# Patient Record
Sex: FEMALE | Race: WHITE | Employment: OTHER | URBAN - METROPOLITAN AREA
[De-identification: names, ages, dates, MRNs, and addresses within clinical notes are randomized per-mention and may not be internally consistent; named-entity substitution may affect disease eponyms.]

---

## 2017-02-19 RX ORDER — LISINOPRIL 10 MG/1
TABLET ORAL
Qty: 30 TAB | Refills: 4 | Status: SHIPPED | OUTPATIENT
Start: 2017-02-19 | End: 2017-06-26 | Stop reason: SDUPTHER

## 2017-02-23 ENCOUNTER — OFFICE VISIT (OUTPATIENT)
Dept: INTERNAL MEDICINE CLINIC | Age: 67
End: 2017-02-23

## 2017-02-23 VITALS
SYSTOLIC BLOOD PRESSURE: 142 MMHG | TEMPERATURE: 99.3 F | HEART RATE: 78 BPM | BODY MASS INDEX: 24.81 KG/M2 | WEIGHT: 115 LBS | RESPIRATION RATE: 14 BRPM | HEIGHT: 57 IN | DIASTOLIC BLOOD PRESSURE: 80 MMHG

## 2017-02-23 DIAGNOSIS — G24.5 BENIGN ESSENTIAL BLEPHAROSPASM: Primary | ICD-10-CM

## 2017-02-23 DIAGNOSIS — N94.9 GYNECOLOGICAL DISORDER: ICD-10-CM

## 2017-02-23 DIAGNOSIS — K22.70 BARRETT'S ESOPHAGUS WITHOUT DYSPLASIA: ICD-10-CM

## 2017-02-23 DIAGNOSIS — M81.0 OSTEOPOROSIS: ICD-10-CM

## 2017-02-23 NOTE — PROGRESS NOTES
SUBJECTIVE: Terell Johnson is a 74 y.o. female seen for a follow up visit; she has hypertension and hyperlipidemia. Current Outpatient Prescriptions   Medication Sig Dispense Refill    lisinopril (PRINIVIL, ZESTRIL) 10 mg tablet TAKE ONE TABLET BY MOUTH DAILY 30 Tab 4    RESTASIS 0.05 % ophthalmic emulsion   6    simvastatin (ZOCOR) 10 mg tablet Take 1 Tab by mouth nightly. 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 New concerns: Patient  also  is here for evaluation of osteoporosis. She has not had history of fracture . Bone density is up to date and showed   DEXA Results (most recent):    Results from Hospital Encounter encounter on 09/21/16   DEXA BONE DENSITY STUDY AXIAL   Narrative Bone Mineral Density    Indication:  screening  Age: 74  Sex: Female. Menopause status: postmenopausal.  Hormone replacement therapy: No     Number of falls in the past year:   1   Risk factors for osteoporosis:  None. Current medication for osteoporosis: None. Comparison: None. Technique: Imaging was performed on the Concept3D      Excluded sites: None     Findings:      Femoral Neck:  Left  Bone mineral density (gm/cm2):?  0.673  % of peak bone mass: 65  % for age matched controls:? 80  T-score: -2.6  Z-score: -0.7    Total Hip: Left  Bone mineral density (gm/cm2):  0.870  % of peak bone mass:   86  % for age matched controls:  110  T-score:   -1.1  Z-score:  0.6    Lumbar Spine:  L1-L4  Bone mineral density (gm/cm2):  0.863  % of peak bone mass:  73   % for age matched controls:  80  T-score:  -2.7  Z-score:  -0.5    The T score for the left ultra distal radius is -2.5. The T score for the left  distal third radius is -1.8. Impression Impression: This patient is osteoporotic using the World Health Organization criteria      Recommendations:  Therapy recommendations need to be tailored to each individual patient. Please reconsider testing based on risk factors. Currently, Medicare will only  reimburse for a central DXA examination every two years, unless the patient is  on chronic glucocorticoid therapy. Note: Please note that reliable, valid comparisons cannot be made between  studies which have been performed on machines from different manufacturers. If  clinically warranted, a follow up study performed at this site, on the same  unit, would allow the most sensitive assessment of change in bone mineral  density. . Patient does exercise daily. Family history is positive for osteoporosis. Age at menopause 46. Patient does not smoke. Previous treatment calcium with vitamin D 1200mg daily and was on evista for a short while. .   Off gerd meds no symptoms  Needs egd to confirm  OBJECTIVE:  Visit Vitals    /80    Pulse 78    Temp 99.3 °F (37.4 °C) (Oral)    Resp 14    Ht 4' 9\" (1.448 m)    Wt 115 lb (52.2 kg)    BMI 24.89 kg/m2      Appearance: alert, well appearing, and in no distress, oriented to person, place, and time and normal appearing weight. General exam: CVS exam BP noted to be borderline elevated today in office, S1, S2 normal, no gallop, no murmur, chest clear, no JVD, no HSM, no edema, peripheral vascular exam both carotids normal upstroke without bruits, neurological exam alert, oriented, normal speech, no focal findings or movement disorder noted, normal muscle tone, no tremors, strength 5/5.   Lab review: most recent lipid panel reviewed, showing LDL result meets goal. ASSESSMENT:  hypertension stable, reasonably well controlled, needs further observation. PLAN:  current treatment plan is effective, no change in therapy  lab results and schedule of future lab studies reviewed with patient  repeat labs ordered prior to next appointment. Reviewed   Data suggest biphosphonate  She is resistant  Will   Repeat BMD 2018  Weight bearing  Calcium supplement 500 mg daily with Vit D  600 to 1000 units daily advised for bone protection  Gabriela Stratton was seen today for hypertension.     Diagnoses and all orders for this visit:    Benign essential blepharospasm    Gama's esophagus without dysplasia  -     REFERRAL TO GASTROENTEROLOGY    Gynecological disorder  -     REFERRAL TO OBSTETRICS AND GYNECOLOGY    Osteoporosis

## 2017-02-23 NOTE — MR AVS SNAPSHOT
Visit Information Date & Time Provider Department Dept. Phone Encounter #  
 2/23/2017  1:45 PM Mirian Diaz, [facility] [facility] [phone] [id number] Upcoming Health Maintenance Date Due DTaP/Tdap/Td series (1 - Tdap) 6/4/1969 ZOSTER VACCINE AGE 60> 6/4/2008 Pneumococcal 65+ Low/Medium Risk (1 of 2 - PCV13) 6/4/2013 INFLUENZA AGE 9 TO ADULT 8/1/2016 MEDICARE YEARLY EXAM 8/17/2017 GLAUCOMA SCREENING Q2Y 12/11/2017 BREAST CANCER SCRN MAMMOGRAM 9/21/2018 COLONOSCOPY 5/30/2020 Allergies as of 2/23/2017  Review Complete On: 2/23/2017 By: Damaris Mims LPN Severity Noted Reaction Type Reactions Codeine  10/14/2015    Other (comments) Keflex [Cephalexin]  10/14/2015    Other (comments) Current Immunizations  Reviewed on 2/23/2017 Name Date Influenza Vaccine 10/1/2016 Influenza Vaccine (Quad) PF 10/14/2015 Reviewed by Damaris Mims LPN on 9/69/4701 at  1:51 PM  
You Were Diagnosed With   
  
 Codes Comments Gama's esophagus without dysplasia    -  Primary ICD-10-CM: K22.70 ICD-9-CM: 530.85 Gynecological disorder     ICD-10-CM: N94.9 ICD-9-CM: 629.9 Vitals BP  
  
  
  
  
  
 142/80 Vitals History BMI and BSA Data Body Mass Index Body Surface Area  
 24.89 kg/m 2 1.45 m 2 Preferred Pharmacy Pharmacy Name Phone Rodriguez Pardo [address] [facility], [address] [place] [address] [phone] Your Updated Medication List  
  
   
This list is accurate as of: 2/23/17  2:20 PM.  Always use your most recent med list.  
  
  
  
  
 lisinopril 10 mg tablet Commonly known as:  PRINIVIL, ZESTRIL  
TAKE ONE TABLET BY MOUTH DAILY RESTASIS 0.05 % ophthalmic emulsion Generic drug:  cycloSPORINE  
  
 simvastatin 10 mg tablet Commonly known as:  ZOCOR Take 1 Tab by mouth nightly. We Performed the Following REFERRAL TO GASTROENTEROLOGY [UWN82 Custom] Comments:  
 Please evaluate patient for poss gerd Miryam Ku REFERRAL TO OBSTETRICS AND GYNECOLOGY [REF51 Custom] Referral Information Referral ID Referred By Referred To  
  
 [id number] SHADY, [address] [facility]   
   [address]  [place], [address] Visits Status Start Date End Date 1 New Request 2/23/17 2/23/18 If your referral has a status of pending review or denied, additional information will be sent to support the outcome of this decision. Referral ID Referred By Referred To  
 [id number] Kathie Camara MD  
   [address] [facility], [address] Phone: [phone] Fax: [phone] Visits Status Start Date End Date 1 New Request 2/23/17 2/23/18 If your referral has a status of pending review or denied, additional information will be sent to support the outcome of this decision. Introducing [facility] & HEALTH SERVICES! Do Purvis introduces DINKlife patient portal. Now you can access parts of your medical record, email your doctor's office, and request medication refills online. 1. In your internet browser, go to [email or web address] 2. Click on the First Time User? Click Here link in the Sign In box. You will see the New Member Sign Up page. 3. Enter your DINKlife Access Code exactly as it appears below. You will not need to use this code after youve completed the sign-up process. If you do not sign up before the expiration date, you must request a new code. · DINKlife Access Code: [email or web address] Expires: 5/24/2017  2:19 PM 
 
4. Enter the last four digits of your Social Security Number (xxxx) and Date of Birth (mm/dd/yyyy) as indicated and click Submit. You will be taken to the next sign-up page. 5. Create a DINKlife ID.  This will be your DINKlife login ID and cannot be changed, so think of one that is secure and easy to remember. 6. Create a BuildingOps password. You can change your password at any time. 7. Enter your Password Reset Question and Answer. This can be used at a later time if you forget your password. 8. Enter your e-mail address. You will receive e-mail notification when new information is available in [address] 9. Click Sign Up. You can now view and download portions of your medical record. 10. Click the Download Summary menu link to download a portable copy of your medical information. If you have questions, please visit the Frequently Asked Questions section of the BuildingOps website. Remember, BuildingOps is NOT to be used for urgent needs. For medical emergencies, dial 911. Now available from your iPhone and Android! Please provide this summary of care documentation to your next provider. Your primary care clinician is listed as Valerie Saldana. If you have any questions after today's visit, please call [phone].

## 2017-03-13 DIAGNOSIS — E78.5 INCREASED SERUM LIPIDS: ICD-10-CM

## 2017-03-13 RX ORDER — SIMVASTATIN 10 MG/1
10 TABLET, FILM COATED ORAL
Qty: 90 TAB | Refills: 2 | Status: SHIPPED | OUTPATIENT
Start: 2017-03-13 | End: 2017-08-22 | Stop reason: SDUPTHER

## 2017-03-21 ENCOUNTER — OFFICE VISIT (OUTPATIENT)
Dept: OBGYN CLINIC | Age: 67
End: 2017-03-21

## 2017-03-21 VITALS
HEIGHT: 57 IN | HEART RATE: 67 BPM | SYSTOLIC BLOOD PRESSURE: 172 MMHG | WEIGHT: 117 LBS | BODY MASS INDEX: 25.24 KG/M2 | DIASTOLIC BLOOD PRESSURE: 77 MMHG

## 2017-03-21 DIAGNOSIS — Z01.419 ENCOUNTER FOR WELL WOMAN EXAM WITH ROUTINE GYNECOLOGICAL EXAM: Primary | ICD-10-CM

## 2017-03-21 NOTE — PATIENT INSTRUCTIONS

## 2017-03-21 NOTE — MR AVS SNAPSHOT
Visit Information Date & Time Provider Department Dept. Phone Encounter #  
 3/21/2017  9:00 AM Janeen Arguello, [address] [phone] [id number] Your Appointments 8/22/2017 11:00 AM  
ROUTINE CARE with Ronaldo Sanford MD  
[facility]) Appt Note: 6 month f/u  
 [place] [address] [facility] [address]  
[facility] [address] [facility] [address] [facility] [address] Upcoming Health Maintenance Date Due ZOSTER VACCINE AGE 60> 6/4/2008 BREAST CANCER SCRN MAMMOGRAM 9/21/2018 COLONOSCOPY 5/30/2020 Allergies as of 3/21/2017  Review Complete On: 3/21/2017 By: Matias Eldridge Severity Noted Reaction Type Reactions Codeine  10/14/2015    Nausea and Vomiting Keflex [Cephalexin]  10/14/2015    Nausea and Vomiting Current Immunizations  Reviewed on 2/23/2017 Name Date Influenza Vaccine 10/1/2016 Influenza Vaccine (Quad) PF 10/14/2015 Not reviewed this visit You Were Diagnosed With   
  
 Codes Comments Encounter for well woman exam with routine gynecological exam    -  Primary ICD-10-CM: Y82.129 ICD-9-CM: V72.31 Screening for HPV (human papillomavirus)     ICD-10-CM: Z11.51 
ICD-9-CM: V73.81 Vitals Height(growth percentile) Weight(growth percentile) BMI OB Status Smoking Status 4' 9\" (1.448 m) 117 lb (53.1 kg) 25.32 kg/m2 Postmenopausal Never Smoker BMI and BSA Data Body Mass Index Body Surface Area  
 25.32 kg/m 2 1.46 m 2 Preferred Pharmacy Pharmacy Name Phone [facility] [address] [facility] [employer], [address] [place] [address], [address] [phone] Your Updated Medication List  
  
   
This list is accurate as of: 3/21/17  9:11 AM.  Always use your most recent med list.  
  
  
  
  
 lisinopril 10 mg tablet Commonly known as:  PRINIVIL, ZESTRIL  
TAKE ONE TABLET BY MOUTH DAILY RESTASIS 0.05 % ophthalmic emulsion Generic drug:  cycloSPORINE  
  
 simvastatin 10 mg tablet Commonly known as:  ZOCOR Take 1 Tab by mouth nightly. Patient Instructions Well Visit, Over 72: Care Instructions Your Care Instructions Physical exams can help you stay healthy. Your doctor has checked your overall health and may have suggested ways to take good care of yourself. He or she also may have recommended tests. At home, you can help prevent illness with healthy eating, regular exercise, and other steps. Follow-up care is a key part of your treatment and safety. Be sure to make and go to all appointments, and call your doctor if you are having problems. It's also a good idea to know your test results and keep a list of the medicines you take. How can you care for yourself at home? · Reach and stay at a healthy weight. This will lower your risk for many problems, such as obesity, diabetes, heart disease, and high blood pressure. · Get at least 30 minutes of exercise on most days of the week. Walking is a good choice. You also may want to do other activities, such as running, swimming, cycling, or playing tennis or team sports. · Do not smoke. Smoking can make health problems worse. If you need help quitting, talk to your doctor about stop-smoking programs and medicines. These can increase your chances of quitting for good. · Protect your skin from too much sun. When you're outdoors from 10 a.m. to 4 p.m., stay in the shade or cover up with clothing and a hat with a wide brim. Wear sunglasses that block UV rays. Even when it's cloudy, put broad-spectrum sunscreen (SPF 30 or higher) on any exposed skin. · See a dentist one or two times a year for checkups and to have your teeth cleaned. · Wear a seat belt in the car. · Limit alcohol to 2 drinks a day for men and 1 drink a day for women. Too much alcohol can cause health problems. Follow your doctor's advice about when to have certain tests. These tests can spot problems early. For men and women · Cholesterol. Your doctor will tell you how often to have this done based on your overall health and other things that can increase your risk for heart attack and stroke. · Blood pressure. Have your blood pressure checked during a routine doctor visit. Your doctor will tell you how often to check your blood pressure based on your age, your blood pressure results, and other factors. · Diabetes. Ask your doctor whether you should have tests for diabetes. · Vision. Experts recommend that you have yearly exams for glaucoma and other age-related eye problems. · Hearing. Tell your doctor if you notice any change in your hearing. You can have tests to find out how well you hear. · Colon cancer tests. Keep having colon cancer tests as your doctor recommends. You can have one of several types of tests. · Heart attack and stroke risk. At least every 4 to 6 years, you should have your risk for heart attack and stroke assessed. Your doctor uses factors such as your age, blood pressure, cholesterol, and whether you smoke or have diabetes to show what your risk for a heart attack or stroke is over the next 10 years. · Osteoporosis. Talk to your doctor about whether you should have a bone density test to find out whether you have thinning bones. Also ask your doctor about whether you should take calcium and vitamin D supplements. For women · Pap test and pelvic exam. You may no longer need a Pap test. Talk with your doctor about whether to stop or continue to have Pap tests. · Breast exam and mammogram. Ask how often you should have a mammogram, which is an X-ray of your breasts. A mammogram can spot breast cancer before it can be felt and when it is easiest to treat. · Thyroid disease.  Talk to your doctor about whether to have your thyroid checked as part of a regular physical exam. Women have an increased chance of a thyroid problem. For men · Prostate exam. Talk to your doctor about whether you should have a blood test (called a PSA test) for prostate cancer. Experts disagree on whether men should have this test. Some experts recommend that you discuss the benefits and risks of the test with your doctor. · Abdominal aortic aneurysm. Ask your doctor whether you should have a test to check for an aneurysm. You may need a test if you ever smoked or if your parent, brother, sister, or child has had an aneurysm. When should you call for help? Watch closely for changes in your health, and be sure to contact your doctor if you have any problems or symptoms that concern you. Where can you learn more? Go to http://kristin-rajni.info/. Enter X399 in the search box to learn more about \"Well Visit, Over 65: Care Instructions. \" Current as of: July 19, 2016 Content Version: 11.1 © 0245-6115 Kakoona. Care instructions adapted under license by Awesomi (which disclaims liability or warranty for this information). If you have questions about a medical condition or this instruction, always ask your healthcare professional. [place] [address] any warranty or liability for your use of this information. Introducing [facility] & HEALTH SERVICES! Dear Chelly Casarez: Thank you for requesting a EvoApp account. Our records indicate that you already have an active EvoApp account. You can access your account anytime at [email or web address] Did you know that you can access your hospital and ER discharge instructions at any time in EvoApp? You can also review all of your test results from your hospital stay or ER visit. Additional Information If you have questions, please visit the Frequently Asked Questions section of the [email or web address] website at [email or web address] Remember, [email or web address] is NOT to be used for urgent needs. For medical emergencies, dial 911. Now available from your iPhone and Android! Please provide this summary of care documentation to your next provider. Your primary care clinician is listed as Carlos Orellana. If you have any questions after today's visit, please call [phone].

## 2017-03-21 NOTE — PROGRESS NOTES
Annual exam ages 69+  (New Patient)    Janes Plascencia is a ,  74 y.o. female [facility] No LMP recorded. Patient is postmenopausal.    She presents for her annual checkup. Moved here from [place] (). Sees Dr. Modesta Bland. She is having no significant problems. With regard to the Gardasil vaccine, she is older than the age for which it is FDA approved. Menstrual status:    Has gone through menopause. Around 46yo? No PMB. She denies dysmenorrhea. Hormonal status:  She reports no perimenstrual type symptoms. She is not having vasomotor symptoms. The patient is not using any ERT. Did take hormone therapy (Prempro?) for at least 3yrs. Sexual history:    She  reports that she does not currently engage in sexual activity but has had male partners.; She reports using the following method of birth control/protection: Surgical.    Medical conditions:    Since her last annual GYN exam about two years ago, she has not the following changes in her health history:  - dx'd with osteoporosis. Supposed to be taking Vit D, calcium. Has taken Evista in the past but had problems with leg cramps? Pap and Mammogram History:    Her most recent Pap smear was normal obtained 2 year(s) ago per pt. No h/o abnl paps. The patient had a recent mammogram 16 which was negative for malignancy. Breast Cancer History/Substance Abuse: negative    Osteoporosis History:    Family history does not include a first or second degree relative with osteopenia or osteoporosis. A bone density scan was obtained on 16 and revealed osteoporosis. She is not currently taking calcium and vit D. Past Medical History:   Diagnosis Date    Arthritis     Blepharospasm     Hx of mammogram 2016    Negative    Hyperlipidemia     Hypertension     Neuralgia     Osteoporosis 2016    DEXA    Routine Papanicolaou smear 2015    Negative per pt.      Sicca syndrome [facility])      Past Surgical History:   Procedure Laterality Date    HX TUBAL LIGATION      90s       Current Outpatient Prescriptions   Medication Sig Dispense Refill    simvastatin (ZOCOR) 10 mg tablet Take 1 Tab by mouth nightly. 90 Tab 2    lisinopril (PRINIVIL, ZESTRIL) 10 mg tablet TAKE ONE TABLET BY MOUTH DAILY 30 Tab 4    RESTASIS 0.05 % ophthalmic emulsion   6     Allergies: Codeine and Keflex [cephalexin]     Tobacco History:  reports that she has never smoked. She has never used smokeless tobacco.  Alcohol Abuse:  reports that she drinks about 3.0 oz of alcohol per week   Drug Abuse:  reports that she does not use illicit drugs.     Family Medical/Cancer History:   Family History   Problem Relation Age of Onset    Psychiatric Disorder Mother     Heart Disease Father     Diabetes Father     Alzheimer Father    John Trinidad Sister      Hodgkins Disease     Other Sister      Non-Hodkins Disease         Review of Systems - History obtained from the patient  Constitutional: negative for weight loss, fever, night sweats  HEENT: negative for hearing loss, earache, congestion, snoring, sorethroat  CV: negative for chest pain, palpitations, edema  Resp: negative for cough, shortness of breath, wheezing  GI: negative for change in bowel habits, abdominal pain, black or bloody stools  : negative for dysuria, hematuria, vaginal discharge; has always had some frequency  MSK: negative for back pain, joint pain, muscle pain  Breast: negative for breast lumps, nipple discharge, galactorrhea  Skin :negative for itching, rash, hives  Neuro: negative for dizziness, headache, confusion, weakness  Psych: negative for anxiety, depression, change in mood  Heme/lymph: negative for bleeding, bruising, pallor    Physical Exam    Visit Vitals    Ht 4' 9\" (1.448 m)    Wt 117 lb (53.1 kg)    BMI 25.32 kg/m2       Constitutional  · Appearance: well-nourished, well developed, alert, in no acute distress    HENT  · Head and Face: appears normal    Neck  · Inspection/Palpation: normal appearance, no masses or tenderness  · Lymph Nodes: no lymphadenopathy present  · Thyroid: gland size normal, nontender, no nodules or masses present on palpation    Chest  · Respiratory Effort: breathing unlabored  · Auscultation: normal breath sounds    Cardiovascular  · Heart:  · Auscultation: regular rate and rhythm without murmur    Breasts  · Inspection of Breasts: breasts symmetrical, no skin changes, no discharge present, nipple appearance normal, no skin retraction present  · Palpation of Breasts and Axillae: no masses present on palpation, no breast tenderness  · Axillary Lymph Nodes: no lymphadenopathy present    Gastrointestinal  · Abdominal Examination: abdomen non-tender to palpation, normal bowel sounds, no masses present  · Liver and spleen: no hepatomegaly present, spleen not palpable  · Hernias: no hernias identified    Genitourinary  · External Genitalia: normal appearance for age, no discharge present, no tenderness present, no inflammatory lesions present, no masses present, mild atrophy present  · Vagina: atrophic but otherwise normal vaginal vault without central or paravaginal defects, no discharge present, no inflammatory lesions present, no masses present  · Bladder: non-tender to palpation  · Urethra: appears normal  · Cervix: normal   · Uterus: normal size, shape and consistency  · Adnexa: no adnexal tenderness present, no adnexal masses present  · Perineum: perineum within normal limits, no evidence of trauma, no rashes or skin lesions present  · Anus: anus within normal limits, no hemorrhoids present  · Inguinal Lymph Nodes: no lymphadenopathy present  · RV:  No masses    Skin  · General Inspection: no rash, no lesions identified    Neurologic/Psychiatric  · Mental Status:  · Orientation: grossly oriented to person, place and time  · Mood and Affect: mood normal, affect appropriate    Assessment:  Routine gynecologic examination  Her current medical status is satisfactory with no evidence of significant gynecologic issues. Plan:  Counseled re: diet, exercise, healthy lifestyle  Return for yearly wellness visits  Rec annual mammogram  Pap done. Reviewed guidelines to exit routine screening. Will plan on pap q2yrs x3. If all negative, then can stop screening.     Orders Placed This Encounter    PAP, IG, RFX HPV ASCUS ([id number])

## 2017-03-24 LAB
CYTOLOGIST CVX/VAG CYTO: NORMAL
CYTOLOGY CVX/VAG DOC THIN PREP: NORMAL
DX ICD CODE: NORMAL
LABCORP, 190119: NORMAL
Lab: NORMAL
Lab: NORMAL
OTHER STN SPEC: NORMAL
PATH REPORT.FINAL DX SPEC: NORMAL
STAT OF ADQ CVX/VAG CYTO-IMP: NORMAL

## 2017-06-27 RX ORDER — LISINOPRIL 10 MG/1
TABLET ORAL
Qty: 30 TAB | Refills: 5 | Status: SHIPPED | OUTPATIENT
Start: 2017-06-27 | End: 2017-08-22 | Stop reason: SDUPTHER

## 2017-08-22 ENCOUNTER — OFFICE VISIT (OUTPATIENT)
Dept: INTERNAL MEDICINE CLINIC | Age: 67
End: 2017-08-22

## 2017-08-22 ENCOUNTER — HOSPITAL ENCOUNTER (OUTPATIENT)
Dept: LAB | Age: 67
Discharge: HOME | End: 2017-08-22
Payer: MEDICARE

## 2017-08-22 VITALS
BODY MASS INDEX: 25.03 KG/M2 | TEMPERATURE: 99 F | HEIGHT: 57 IN | SYSTOLIC BLOOD PRESSURE: 152 MMHG | OXYGEN SATURATION: 98 % | DIASTOLIC BLOOD PRESSURE: 70 MMHG | RESPIRATION RATE: 15 BRPM | HEART RATE: 64 BPM | WEIGHT: 116 LBS

## 2017-08-22 DIAGNOSIS — R51.9 TEMPORAL HEADACHE: ICD-10-CM

## 2017-08-22 DIAGNOSIS — M81.0 AGE-RELATED OSTEOPOROSIS WITHOUT CURRENT PATHOLOGICAL FRACTURE: ICD-10-CM

## 2017-08-22 DIAGNOSIS — E78.5 INCREASED SERUM LIPIDS: ICD-10-CM

## 2017-08-22 DIAGNOSIS — E78.5 DYSLIPIDEMIA: ICD-10-CM

## 2017-08-22 DIAGNOSIS — G24.5 BENIGN ESSENTIAL BLEPHAROSPASM: Primary | ICD-10-CM

## 2017-08-22 PROBLEM — H40.052 OCULAR HYPERTENSION OF LEFT EYE: Status: ACTIVE | Noted: 2017-08-22

## 2017-08-22 PROCEDURE — 83735 ASSAY OF MAGNESIUM: CPT

## 2017-08-22 PROCEDURE — 80053 COMPREHEN METABOLIC PANEL: CPT

## 2017-08-22 PROCEDURE — 84443 ASSAY THYROID STIM HORMONE: CPT

## 2017-08-22 PROCEDURE — 85025 COMPLETE CBC W/AUTO DIFF WBC: CPT

## 2017-08-22 PROCEDURE — 86140 C-REACTIVE PROTEIN: CPT

## 2017-08-22 PROCEDURE — 36415 COLL VENOUS BLD VENIPUNCTURE: CPT

## 2017-08-22 PROCEDURE — 80061 LIPID PANEL: CPT

## 2017-08-22 RX ORDER — LISINOPRIL 20 MG/1
TABLET ORAL
Qty: 90 TAB | Refills: 2 | Status: SHIPPED | OUTPATIENT
Start: 2017-08-22 | End: 2018-02-22 | Stop reason: SDUPTHER

## 2017-08-22 RX ORDER — LATANOPROST 50 UG/ML
1 SOLUTION/ DROPS OPHTHALMIC
COMMUNITY
Start: 2017-07-27 | End: 2019-11-25 | Stop reason: SDUPTHER

## 2017-08-22 RX ORDER — SIMVASTATIN 10 MG/1
10 TABLET, FILM COATED ORAL
Qty: 90 TAB | Refills: 2 | Status: SHIPPED | OUTPATIENT
Start: 2017-08-22 | End: 2018-02-22 | Stop reason: SDUPTHER

## 2017-08-22 NOTE — PROGRESS NOTES
Coordination of Care Questions    1. Have you been to the ER, urgent care clinic since your last visit? No       Hospitalized since your last visit? No    2. Have you seen or consulted any other health care providers outside of the [address] [place] [address] since your last visit? Include any pap smears or colon screening.  Yes eye doctor with Dr Boone Phillips and Dr Laura Oakley for [facility]

## 2017-08-22 NOTE — PROGRESS NOTES
This is a Subsequent Medicare Annual Wellness Exam (AWV) (Performed 12 months after IPPE or effective date of Medicare Part B enrollment, Once in a lifetime)    I have reviewed the patient's medical history in detail and updated the computerized patient record. History     Past Medical History:   Diagnosis Date    Arthritis     Blepharospasm     Hx of mammogram 2016    Negative    Hyperlipidemia     Hypertension     Neuralgia     Osteoporosis 2016    DEXA    Routine Papanicolaou smear 2017    Negative (no hpv)     Sicca syndrome (HCC)       Past Surgical History:   Procedure Laterality Date    HX  SECTION      x2    HX TUBAL LIGATION      90s; L/S     Current Outpatient Prescriptions   Medication Sig Dispense Refill    latanoprost (XALATAN) 0.005 % ophthalmic solution Administer 1 Drop to left eye nightly.  lisinopril (PRINIVIL, ZESTRIL) 20 mg tablet TAKE ONE TABLET BY MOUTH DAILY 90 Tab 2    simvastatin (ZOCOR) 10 mg tablet Take 1 Tab by mouth nightly. 90 Tab 2    diph,Pertuss,Acell,,Tet Vac-PF (ADACEL) 2 Lf-(2.5-5-3-5 mcg)-5Lf/0.5 mL susp 0.5 mL by IntraMUSCular route once for 1 dose.  1 Syringe 0    RESTASIS 0.05 % ophthalmic emulsion   6     Allergies   Allergen Reactions    Codeine Nausea and Vomiting    Keflex [Cephalexin] Nausea and Vomiting     Family History   Problem Relation Age of Onset    Alzheimer Mother       @ 81yo    Heart Disease Father     Diabetes Father     Alzheimer Father     Cancer Brother      Lung ( @ 64yo)   [facility] Cancer Sister      Hodgkins Disease ( at 42yo)   [facility] Other Sister      Non-Hodkins Disease      Social History   Substance Use Topics    Smoking status: Never Smoker    Smokeless tobacco: Never Used      Comment: Never used vapor or e-cigs     Alcohol use 3.0 oz/week     5 Glasses of wine per week     Patient Active Problem List   Diagnosis Code    Benign essential blepharospasm G24.5    Osteoporosis M81.0    Increased serum lipids E78.5    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 Activities of Daily Living  The home contains: no safety equipment  Patient does total self care    Fall Risk  Fall Risk Assessment, last 12 mths 8/22/2017   Able to walk? Yes   Fall in past 12 months? No       Abuse Screen  Patient is not abused    Cognitive Screening   Evaluation of Cognitive Function:  Has your family/caregiver stated any concerns about your memory: no  Patient states she has difficulty remembering dates and time frames. She writes notes to keep track of information. Patient Care Team   Patient Care Team:  Mehrdad Simms MD as PCP - General (Internal Medicine)  Shira Lucas MD (Ophthalmology)  Lidia Vallecillo MD (Neurology)  Carlean Angelucci, MD (Obstetrics & Gynecology)    Advice/Referrals/Counseling   Education and counseling provided:  Are appropriate based on today's review and evaluation  End-of-Life planning (with patient's consent)  Pneumococcal Vaccine  Influenza Vaccine  Screening Mammography  Colorectal cancer screening tests  Cardiovascular screening blood test  Screening for glaucoma  Diabetes screening test      Assessment/Plan   Diagnoses and all orders for this visit:    1. Benign essential blepharospasm  -     MAGNESIUM  -     TSH 3RD GENERATION    2. Age-related osteoporosis without current pathological fracture  -     TSH 3RD GENERATION  -     VITAMIN D, 25 HYDROXY; Future    3.  Dyslipidemia  -     CBC WITH AUTOMATED DIFF  -     LIPID PANEL  - METABOLIC PANEL, COMPREHENSIVE  -     TSH 3RD GENERATION    4. Temporal headache  -     SED RATE (ESR); Future  -     TSH 3RD GENERATION  -     C REACTIVE PROTEIN, QT    5. Increased serum lipids  -     simvastatin (ZOCOR) 10 mg tablet; Take 1 Tab by mouth nightly. Other orders  -     lisinopril (PRINIVIL, ZESTRIL) 20 mg tablet; TAKE ONE TABLET BY MOUTH DAILY  -     diph,Pertuss,Acell,,Tet Vac-PF (ADACEL) 2 Lf-(2.5-5-3-5 mcg)-5Lf/0.5 mL susp; 0.5 mL by IntraMUSCular route once for 1 dose. There are no preventive care reminders to display for this patient. An After Visit Summary was printed and given to the patient upon exit from office. 1. All age appropriate screenings and immunizations are discussed with patient. Patient is up to date on all screenings. She is given lab slip today for her lipid panel and CMP. She is given a prescription for the Prevnar vaccine today to take to her pharmacy. She will get a mammogram is September. She sees her eye doctor every 6 months d/t strong family hx of glaucoma and recent issues with pressure in her left eye. 2.  has an AMD and is advised today that if she brings a copy of this from to our office it can be scanned into her EMR. 3.   goes to the gym 2-3 times weekly and has been instructed by a  on a workout program and use of equipment. She walks daily for exercise.

## 2017-08-22 NOTE — MR AVS SNAPSHOT
Visit Information Date & Time Provider Department Dept. Phone Encounter #  
 8/22/2017 11:00 AM Yanet Samuel MD [facility] [phone] [id number] Your Appointments 3/22/2018  9:00 AM  
ESTABLISHED PATIENT with Ozzie Catalan MD  
Gabriel Santos ([address]) Appt Note: AE, pt declined mammo [address] [facility] [address] [facility] [phone]  
[facility] [address] [place] [address] Upcoming Health Maintenance Date Due ZOSTER VACCINE AGE 60> 4/4/2008 Pneumococcal 65+ Low/Medium Risk (1 of 2 - PCV13) 6/4/2013 DTaP/Tdap/Td series (1 - Tdap) 10/2/2017* INFLUENZA AGE 9 TO ADULT 11/1/2017* GLAUCOMA SCREENING Q2Y 12/11/2017 MEDICARE YEARLY EXAM 8/23/2018 BREAST CANCER SCRN MAMMOGRAM 9/21/2018 COLONOSCOPY 5/30/2020 *Topic was postponed. The date shown is not the original due date. Allergies as of 8/22/2017  Review Complete On: 8/22/2017 By: Rupali Leger LPN Severity Noted Reaction Type Reactions Codeine  10/14/2015    Nausea and Vomiting Keflex [Cephalexin]  10/14/2015    Nausea and Vomiting Current Immunizations  Reviewed on 2/23/2017 Name Date Influenza Vaccine 10/1/2016 Influenza Vaccine (Quad) PF 10/14/2015 Pneumococcal Conjugate (PCV-13) 8/12/2016 Zoster Vaccine, Live 10/12/2010 Not reviewed this visit You Were Diagnosed With   
  
 Codes Comments Benign essential blepharospasm    -  Primary ICD-10-CM: G24.5 ICD-9-CM: 333.81 Age-related osteoporosis without current pathological fracture     ICD-10-CM: M81.0 ICD-9-CM: 733.01 Dyslipidemia     ICD-10-CM: E78.5 ICD-9-CM: 272.4 Temporal headache     ICD-10-CM: R51 ICD-9-CM: 784.0 Increased serum lipids     ICD-10-CM: E78.5 ICD-9-CM: 272.4 Vitals BP Pulse Temp Resp Height(growth percentile) Weight(growth percentile) 152/70 64 99 °F (37.2 °C) (Oral) 15 4' 9\" (1.448 m) 116 lb (52.6 kg) SpO2 BMI OB Status Smoking Status 98% 25.1 kg/m2 Postmenopausal Never Smoker Vitals History BMI and BSA Data Body Mass Index Body Surface Area  
 25.1 kg/m 2 1.45 m 2 Preferred Pharmacy Pharmacy Name Phone [facility] [address] [place] [address], [address] [place] [address], [address] [phone] Your Updated Medication List  
  
   
This list is accurate as of: 17 12:09 PM.  Always use your most recent med list.  
  
  
  
  
 diph,Pertuss(Acell),Tet Vac-PF 2 Lf-(2.5-5-3-5 mcg)-5Lf/0.5 mL susp Commonly known as:  ADACEL  
0.5 mL by IntraMUSCular route once for 1 dose. latanoprost 0.005 % ophthalmic solution Commonly known as:  Nonnie Bucbucky Administer 1 Drop to left eye nightly. lisinopril 20 mg tablet Commonly known as:  PRINIVIL, ZESTRIL  
TAKE ONE TABLET BY MOUTH DAILY RESTASIS 0.05 % ophthalmic emulsion Generic drug:  cycloSPORINE  
  
 simvastatin 10 mg tablet Commonly known as:  ZOCOR Take 1 Tab by mouth nightly. Prescriptions Printed Refills diph,Pertuss,Acell,,Tet Vac-PF (ADACEL) 2 Lf-(2.5-5-3-5 mcg)-5Lf/0.5 mL susp 0 Si.5 mL by IntraMUSCular route once for 1 dose. Class: Print Route: IntraMUSCular Prescriptions Sent to Pharmacy Refills  
 lisinopril (PRINIVIL, ZESTRIL) 20 mg tablet 2 Sig: TAKE ONE TABLET BY MOUTH DAILY Class: Normal  
 Pharmacy: [facility] [address] [place] [address], [place] [facility] Ph #: [phone]  
 simvastatin (ZOCOR) 10 mg tablet 2 Sig: Take 1 Tab by mouth nightly. Class: Normal  
 Pharmacy: [facility] [address] [place] [address], [address] [facility], [address] Ph #: [phone] Route: Oral  
  
We Performed the Following C REACTIVE PROTEIN, QT [41175 CPT(R)] CBC WITH AUTOMATED DIFF [30141 CPT(R)] LIPID PANEL [25875 CPT(R)] MAGNESIUM [id number] CPT(R)] METABOLIC PANEL, COMPREHENSIVE [60915 CPT(R)] TSH 3RD GENERATION [60832 CPT(R)] To-Do List   
 08/22/2017 Lab:  SED RATE (ESR)   
  
 08/22/2017 Lab:  VITAMIN D, 25 HYDROXY Patient Instructions Medicare Part B Preventive Services Limitations Recommendation Scheduled Glaucoma Screening  Covered for patients with diagnosis of diabetes or family history of glaucoma; -Americans age 48 and older; -Americans age 72 and older Completed 7/2017 Eye exam done and was given eye drops for glaucoma Recommended every 1-2 years Due 
11/2017 Colorectal Cancer Screening 
 
-Fecal occult blood test every year OR 
-Flexible sigmoidoscopy every 5 yrs OR 
-Colonoscopy every 10 years OR 
-Barium Enema Age 54-65; After age 76 if history of abnormal results Completed 5/30/2015 Colonoscopy Recommended every 10 years  Due  
5/2025 Let your provider know if you have any concerns Bone Mass Measurement (Dexascan or Bone Density Scan) Age 61 and older Completed 9/21/2016 Recommended every 5 years Let your provider know if you have any concerns Screening Mammography  Age 54-69  Completed 9/21/2016 Recommended every 2 years  Due 
9/2017 Please call  
[phone] to schedule Screening Pap Tests and Pelvic Examination  Age 18-67 Completed 3/21/2017 Recommended every 3 years Completed Per GYN recommendation. Let your provider know if you have any concerns Cardiovascular Screening Blood Tests  
(Cholesterol panel) Annually if on cholesterol medication Completed 8/7/2016 Recommended every 5 years Due NOW Diabetes Screening Tests -Basic Metabolic Panel (BMP) or Hemoglobin A1C (HgbA1C) BMP every 3 years for non-diabetic patients HgbA1C every 3-6 months for diabetic patients Completed 8/17/2016 LAB-CMP Due NOW Resources for Smoking Cessation    American Lung Association 
www.lung. org 
1-042-YCKRHRV www.smokefree. gov 
 
1-800-QUIT NOW Seasonal Influenza Vaccination  Completed Recommended Annually Due Fall 2017 Prevnar Vaccine (PCV 13) Age 72 and older, protects against more types of Pneumonia than the Pneumococcal Vaccine alone Completed Recommended once Pneumococcal Vaccination  
(PPSV 23) Age 72 and older Completed Recommended twice after age 72, space vaccines 5 years apart  Due Tetanus Vaccine All Ages 
 
-Only covered by Medicare Part D through the pharmacy -Requires a prescription from your primary care provider Completed Recommended every 10 years  Due Take prescription to pharmacy for administration Zoster Vaccine (Shingles) Age 61 and older 
 
-Only covered by Medicare Part D through the pharmacy Completed Has had vaccine in the past 
 
 
Recommended once  Completed Family Practice Management 2011 Advanced Medical Directive/Living Will If you have completed an Advanced Medical Directive or a Living Will, please bring a copy to the office at your convenience to be scanned into your record. Introducing [address]! Dear Kyra Velasquez: Thank you for requesting a [employer] account. Our records indicate that you already have an active [employer] account. You can access your account anytime at [email or web address]. Member Desk/[employer] Did you know that you can access your hospital and ER discharge instructions at any time in [employer]? You can also review all of your test results from your hospital stay or ER visit. Additional Information If you have questions, please visit the Frequently Asked Questions section of the [employer] website at [email or web address] [email or web address] Remember, [employer] is NOT to be used for urgent needs. For medical emergencies, dial 911. Now available from your iPhone and Android! Please provide this summary of care documentation to your next provider. Your primary care clinician is listed as Seema Faria. If you have any questions after today's visit, please call [phone].

## 2017-08-22 NOTE — PATIENT INSTRUCTIONS
Medicare Part B Preventive Services Limitations Recommendation Scheduled   Glaucoma Screening  Covered for patients with diagnosis of diabetes or family history of glaucoma; -Americans age 48 and older; -Americans age 72 and older Completed  7/2017    Eye exam done and was given eye drops for glaucoma    Recommended every 1-2 years Due  11/2017   Colorectal Cancer Screening    -Fecal occult blood test every year OR  -Flexible sigmoidoscopy every 5 yrs OR  -Colonoscopy every 10 years OR  -Barium Enema Age 54-65; After age 76 if history of abnormal results Completed   5/30/2015    Colonoscopy Recommended every 10 years  Due   5/2025      Let your provider know if you have any concerns     Bone Mass Measurement (Dexascan or Bone Density Scan)     Age 61 and older     Completed   9/21/2016    Recommended every 5 years   Let your provider know if you have any concerns     Screening Mammography  Age 54-69  Completed  9/21/2016    Recommended every 2 years  Due  9/2017    Please call   [phone] to schedule   Screening Pap Tests and Pelvic Examination  Age 20-65   Completed  3/21/2017     Recommended every 3 years Completed   Per GYN recommendation.     Let your provider know if you have any concerns     Cardiovascular Screening Blood Tests   (Cholesterol panel) Annually if on cholesterol medication Completed   8/7/2016    Recommended every 5 years Due NOW   Diabetes Screening Tests    -Basic Metabolic Panel (BMP) or Hemoglobin A1C (HgbA1C)   BMP every 3 years for non-diabetic patients    HgbA1C every 3-6 months for diabetic patients     Completed   8/17/2016    LAB-CMP Due NOW   Seasonal Influenza Vaccination  Completed     Recommended Annually Due Fall 2017   Prevnar Vaccine  (PCV 13)         Age 72 and older, protects against more types of Pneumonia than the Pneumococcal Vaccine alone Completed  8/26/2016  Recommended once Completed    Pneumococcal Vaccination   (PPSV 21) Age 72 and older Completed         Tetanus Vaccine All Ages    -Only covered by Medicare Part D through the pharmacy    -Requires a prescription from your primary care provider   Completed     Recommended every 10 years  Due     Take prescription to pharmacy for administration   Zoster Vaccine (Shingles) Age 61 and older    -Only covered by Medicare Part D through the pharmacy       Completed   10/12/2010  Recommended once  Completed    Family Practice Management 2011    Advanced Medical Directive/Living Will  If you have completed an Advanced Medical Directive or a Living Will, please bring a copy to the office at your convenience to be scanned into your record. Medicare Wellness Visit, Female    The best way to live healthy is to have a healthy lifestyle by eating a well-balanced diet, exercising regularly, limiting alcohol and stopping smoking. Regular physical exams and screening tests are another way to keep healthy. Preventive exams provided by your health care provider can find health problems before they become diseases or illnesses. Preventive services including immunizations, screening tests, monitoring and exams can help you take care of your own health. All people over age 72 should have a pneumovax  and and a prevnar shot to prevent pneumonia. These are once in a lifetime unless you and your provider decide differently. All people over 65 should have a yearly flu shot and a tetanus vaccine every 10 years. A bone mass density to screen for osteoporosis or thinning of the bones should be done every 2 years after 65. Screening for diabetes mellitus with a blood sugar test should be done every year.     Glaucoma is a disease of the eye due to increased ocular pressure that can lead to blindness and it should be done every year by an eye professional.    Cardiovascular screening tests that check for elevated lipids (fatty part of blood) which can lead to heart disease and strokes should be done every 5 years. Colorectal screening that evaluates for blood or polyps in your colon should be done yearly as a stool test or every five years as a flexible sigmoidoscope or every 10 years as a colonoscopy up to age 76. Breast cancer screening with a mammogram is recommended biennially  for women age 54-69. Screening for cervical cancer with a pap smear and pelvic exam is recommended for women after age 72 years every 2 years up to age 79 or when the provider and patient decide to stop. If there is a history of cervical abnormalities or other increased risk for cancer then the test is recommended yearly. Hepatitis C screening is also recommended for anyone born between [birth year] through [address]. A shingles vaccine is also recommended once in a lifetime after age 61. Your Medicare Wellness Exam is recommended annually. Here is a list of your current Health Maintenance items with a due date: There are no preventive care reminders to display for this patient.

## 2017-08-22 NOTE — PROGRESS NOTES
SUBJECTIVE: Donald Jones is a 69 y.o. female seen for a follow up visit; she has hypertension and hyperlipidemia. Current Outpatient Prescriptions   Medication Sig Dispense Refill    latanoprost (XALATAN) 0.005 % ophthalmic solution Administer 1 Drop to left eye nightly.  lisinopril (PRINIVIL, ZESTRIL) 20 mg tablet TAKE ONE TABLET BY MOUTH DAILY 90 Tab 2    simvastatin (ZOCOR) 10 mg tablet Take 1 Tab by mouth nightly. 90 Tab 2    diph,Pertuss,Acell,,Tet Vac-PF (ADACEL) 2 Lf-(2.5-5-3-5 mcg)-5Lf/0.5 mL susp 0.5 mL by IntraMUSCular route once for 1 dose. 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 New concerns: Patient  also  is here for evaluation of osteoporosis. She has not had history of fracture     She hasn't taken any of the supplements and refused osteoporosis meds. She's been able to wean herself off of her proton pump inhibitors and really doesn't get reflux. She's planned on scheduling a repeat endoscopy this year. Her blood pressure as been running high. She saw ophthalmology and they told her she has increased pressure in the left eye, been having some temporal headaches. No loss of vision. She has had no other really bad symptoms, but blood pressure at home has been higher on the 10 mg dose of Lisinopril.   She on her own bumped the dose up a couple of days to 20 and her pressure was better. Her diet is good, exercise levels are good. She is taking exercise classes, balance classes, laura-chi, all of which are good and she has a healthy diet. DEXA Results (most recent):    Results from Hospital Encounter encounter on 09/21/16   DEXA BONE DENSITY STUDY AXIAL   Narrative Bone Mineral Density    Indication:  screening  Age: 74  Sex: Female. Menopause status: postmenopausal.  Hormone replacement therapy: No     Number of falls in the past year:   1   Risk factors for osteoporosis:  None. Current medication for osteoporosis: None. Comparison: None. Technique: Imaging was performed on the [employer]      Excluded sites: None     Findings:      Femoral Neck:  Left  Bone mineral density (gm/cm2):? 0.673  % of peak bone mass: 65  % for age matched controls:? 80  T-score: -2.6  Z-score: -0.7    Total Hip: Left  Bone mineral density (gm/cm2):  0.870  % of peak bone mass:   86  % for age matched controls:  110  T-score:   -1.1  Z-score:  0.6    Lumbar Spine:  L1-L4  Bone mineral density (gm/cm2):  0.863  % of peak bone mass:  73   % for age matched controls:  80  T-score:  -2.7  Z-score:  -0.5    The T score for the left ultra distal radius is -2.5. The T score for the left  distal third radius is -1.8. Impression Impression: This patient is osteoporotic using the World Health Organization criteria      Recommendations:  Therapy recommendations need to be tailored to each individual patient. Please reconsider testing based on risk factors. Currently, Medicare will only  reimburse for a central DXA examination every two years, unless the patient is  on chronic glucocorticoid therapy. Note: Please note that reliable, valid comparisons cannot be made between  studies which have been performed on machines from different manufacturers.  If  clinically warranted, a follow up study performed at this site, on the same  unit, would allow the most sensitive assessment of change in bone mineral  density. . Patient does exercise daily. Family history is positive for osteoporosis. Age at menopause 46. Patient does not smoke. Previous treatment calcium with vitamin D 1200mg daily and was on evista for a short while. .   Off gerd meds no symptoms  Needs egd to confirm  OBJECTIVE:  Visit Vitals    /70    Pulse 64    Temp 99 °F (37.2 °C) (Oral)    Resp 15    Ht 4' 9\" (1.448 m)    Wt 116 lb (52.6 kg)    SpO2 98%    BMI 25.1 kg/m2      Vitals:    08/22/17 1115 08/22/17 1142   BP: 147/76 152/70   Pulse: 64    Resp: 15    Temp: 99 °F (37.2 °C)    TempSrc: Oral    SpO2: 98%    Weight: 116 lb (52.6 kg)    Height: 4' 9\" (1.448 m)        Appearance: alert, well appearing, and in no distress, oriented to person, place, and time and normal appearing weight. General exam: CVS exam BP noted to be borderline elevated today in office, S1, S2 normal, no gallop, no murmur, chest clear, no JVD, no HSM, no edema, peripheral vascular exam both carotids normal upstroke without bruits, neurological exam alert, oriented, normal speech, no focal findings or movement disorder noted, normal muscle tone, no tremors, strength 5/5. left temporal tenderness  Lab review: most recent lipid panel reviewed, showing LDL result meets goal.     ASSESSMENT:  hypertension not well controlled, reasonably well controlled, needs further observation. PLAN:  current treatment plan is effective, no change in therapy  lab results and schedule of future lab studies reviewed with patient  repeat labs ordered prior to next appointment. Reviewed   Data suggest biphosphonate    Will   Repeat BMD 2018  Weight bearing  Calcium supplement 500 mg daily with Vit D  600 to 1000 units daily advised for bone protection  Diagnoses and all orders for this visit:    1.  Benign essential blepharospasm  -     MAGNESIUM  -     TSH 3RD GENERATION    2. Age-related osteoporosis without current pathological fracture  -     TSH 3RD GENERATION  -     VITAMIN D, 25 HYDROXY; Future    3. Dyslipidemia  -     CBC WITH AUTOMATED DIFF  -     LIPID PANEL  -     METABOLIC PANEL, COMPREHENSIVE  -     TSH 3RD GENERATION    4. Temporal headache  -     SED RATE (ESR); Future  -     TSH 3RD GENERATION  -     C REACTIVE PROTEIN, QT    5. Increased serum lipids  -     simvastatin (ZOCOR) 10 mg tablet; Take 1 Tab by mouth nightly. Other orders  -     lisinopril (PRINIVIL, ZESTRIL) 20 mg tablet; TAKE ONE TABLET BY MOUTH DAILY  -     diph,Pertuss,Acell,,Tet Vac-PF (ADACEL) 2 Lf-(2.5-5-3-5 mcg)-5Lf/0.5 mL susp; 0.5 mL by IntraMUSCular route once for 1 dose.       Wellness exam by nursing reviewed and agree with plans  discussed with nurse

## 2017-08-23 LAB
ALBUMIN SERPL-MCNC: 4.8 G/DL (ref 3.6–4.8)
ALBUMIN/GLOB SERPL: 2.1 {RATIO} (ref 1.2–2.2)
ALP SERPL-CCNC: 73 IU/L (ref 39–117)
ALT SERPL-CCNC: 19 IU/L (ref 0–32)
AST SERPL-CCNC: 22 IU/L (ref 0–40)
BASOPHILS # BLD AUTO: 0 X10E3/UL (ref 0–0.2)
BASOPHILS NFR BLD AUTO: 0 %
BILIRUB SERPL-MCNC: 0.8 MG/DL (ref 0–1.2)
BUN SERPL-MCNC: 8 MG/DL (ref 8–27)
BUN/CREAT SERPL: 14 (ref 12–28)
CALCIUM SERPL-MCNC: 10.2 MG/DL (ref 8.7–10.3)
CHLORIDE SERPL-SCNC: 100 MMOL/L (ref 96–106)
CHOLEST SERPL-MCNC: 182 MG/DL (ref 100–199)
CO2 SERPL-SCNC: 27 MMOL/L (ref 18–29)
CREAT SERPL-MCNC: 0.59 MG/DL (ref 0.57–1)
CRP SERPL-MCNC: 0.5 MG/L (ref 0–4.9)
EOSINOPHIL # BLD AUTO: 0.2 X10E3/UL (ref 0–0.4)
EOSINOPHIL NFR BLD AUTO: 4 %
ERYTHROCYTE [DISTWIDTH] IN BLOOD BY AUTOMATED COUNT: 13.2 % (ref 12.3–15.4)
GLOBULIN SER CALC-MCNC: 2.3 G/DL (ref 1.5–4.5)
GLUCOSE SERPL-MCNC: 85 MG/DL (ref 65–99)
HCT VFR BLD AUTO: 41.8 % (ref 34–46.6)
HDLC SERPL-MCNC: 94 MG/DL
HGB BLD-MCNC: 13.8 G/DL (ref 11.1–15.9)
IMM GRANULOCYTES # BLD: 0 X10E3/UL (ref 0–0.1)
IMM GRANULOCYTES NFR BLD: 0 %
INTERPRETATION, 910389: NORMAL
LDLC SERPL CALC-MCNC: 77 MG/DL (ref 0–99)
LYMPHOCYTES # BLD AUTO: 1.4 X10E3/UL (ref 0.7–3.1)
LYMPHOCYTES NFR BLD AUTO: 30 %
MAGNESIUM SERPL-MCNC: 2.3 MG/DL (ref 1.6–2.3)
MCH RBC QN AUTO: 30.8 PG (ref 26.6–33)
MCHC RBC AUTO-ENTMCNC: 33 G/DL (ref 31.5–35.7)
MCV RBC AUTO: 93 FL (ref 79–97)
MONOCYTES # BLD AUTO: 0.3 X10E3/UL (ref 0.1–0.9)
MONOCYTES NFR BLD AUTO: 6 %
NEUTROPHILS # BLD AUTO: 2.7 X10E3/UL (ref 1.4–7)
NEUTROPHILS NFR BLD AUTO: 60 %
PLATELET # BLD AUTO: 192 X10E3/UL (ref 150–379)
POTASSIUM SERPL-SCNC: 5.1 MMOL/L (ref 3.5–5.2)
PROT SERPL-MCNC: 7.1 G/DL (ref 6–8.5)
RBC # BLD AUTO: 4.48 X10E6/UL (ref 3.77–5.28)
SODIUM SERPL-SCNC: 144 MMOL/L (ref 134–144)
TRIGL SERPL-MCNC: 57 MG/DL (ref 0–149)
TSH SERPL DL<=0.005 MIU/L-ACNC: 2.7 UIU/ML (ref 0.45–4.5)
VLDLC SERPL CALC-MCNC: 11 MG/DL (ref 5–40)
WBC # BLD AUTO: 4.6 X10E3/UL (ref 3.4–10.8)

## 2017-09-25 ENCOUNTER — HOSPITAL ENCOUNTER (OUTPATIENT)
Dept: MAMMOGRAPHY | Age: 67
Discharge: HOME | End: 2017-09-25
Attending: INTERNAL MEDICINE
Payer: MEDICARE

## 2017-09-25 DIAGNOSIS — Z12.31 VISIT FOR SCREENING MAMMOGRAM: ICD-10-CM

## 2017-09-25 PROCEDURE — 77067 SCR MAMMO BI INCL CAD: CPT

## 2017-11-10 ENCOUNTER — ANESTHESIA (OUTPATIENT)
Dept: ENDOSCOPY | Age: 67
End: 2017-11-10
Payer: MEDICARE

## 2017-11-10 ENCOUNTER — HOSPITAL ENCOUNTER (OUTPATIENT)
Age: 67
Setting detail: OUTPATIENT SURGERY
Discharge: HOME | End: 2017-11-10
Attending: INTERNAL MEDICINE | Admitting: INTERNAL MEDICINE
Payer: MEDICARE

## 2017-11-10 ENCOUNTER — ANESTHESIA EVENT (OUTPATIENT)
Dept: ENDOSCOPY | Age: 67
End: 2017-11-10
Payer: MEDICARE

## 2017-11-10 VITALS
BODY MASS INDEX: 24.81 KG/M2 | RESPIRATION RATE: 15 BRPM | WEIGHT: 115 LBS | HEIGHT: 57 IN | DIASTOLIC BLOOD PRESSURE: 69 MMHG | TEMPERATURE: 98.1 F | HEART RATE: 65 BPM | OXYGEN SATURATION: 100 % | SYSTOLIC BLOOD PRESSURE: 169 MMHG

## 2017-11-10 PROCEDURE — 77030009426 HC FCPS BIOP ENDOSC BSC -B: Performed by: INTERNAL MEDICINE

## 2017-11-10 PROCEDURE — 88305 TISSUE EXAM BY PATHOLOGIST: CPT | Performed by: INTERNAL MEDICINE

## 2017-11-10 PROCEDURE — 76060000031 HC ANESTHESIA FIRST 0.5 HR: Performed by: INTERNAL MEDICINE

## 2017-11-10 PROCEDURE — 74011250636 HC RX REV CODE- 250/636

## 2017-11-10 PROCEDURE — 76040000019: Performed by: INTERNAL MEDICINE

## 2017-11-10 PROCEDURE — 74011000250 HC RX REV CODE- 250

## 2017-11-10 RX ORDER — DEXTROMETHORPHAN/PSEUDOEPHED 2.5-7.5/.8
1.2 DROPS ORAL
Status: DISCONTINUED | OUTPATIENT
Start: 2017-11-10 | End: 2017-11-10 | Stop reason: HOSPADM

## 2017-11-10 RX ORDER — LIDOCAINE HYDROCHLORIDE 20 MG/ML
INJECTION, SOLUTION EPIDURAL; INFILTRATION; INTRACAUDAL; PERINEURAL AS NEEDED
Status: DISCONTINUED | OUTPATIENT
Start: 2017-11-10 | End: 2017-11-10 | Stop reason: HOSPADM

## 2017-11-10 RX ORDER — EPINEPHRINE 0.1 MG/ML
1 INJECTION INTRACARDIAC; INTRAVENOUS
Status: DISCONTINUED | OUTPATIENT
Start: 2017-11-10 | End: 2017-11-10 | Stop reason: HOSPADM

## 2017-11-10 RX ORDER — FLUMAZENIL 0.1 MG/ML
0.2 INJECTION INTRAVENOUS
Status: DISCONTINUED | OUTPATIENT
Start: 2017-11-10 | End: 2017-11-10 | Stop reason: HOSPADM

## 2017-11-10 RX ORDER — NALOXONE HYDROCHLORIDE 0.4 MG/ML
0.4 INJECTION, SOLUTION INTRAMUSCULAR; INTRAVENOUS; SUBCUTANEOUS
Status: DISCONTINUED | OUTPATIENT
Start: 2017-11-10 | End: 2017-11-10 | Stop reason: HOSPADM

## 2017-11-10 RX ORDER — ATROPINE SULFATE 0.1 MG/ML
0.4 INJECTION INTRAVENOUS
Status: DISCONTINUED | OUTPATIENT
Start: 2017-11-10 | End: 2017-11-10 | Stop reason: HOSPADM

## 2017-11-10 RX ORDER — SODIUM CHLORIDE 9 MG/ML
INJECTION, SOLUTION INTRAVENOUS
Status: DISCONTINUED | OUTPATIENT
Start: 2017-11-10 | End: 2017-11-10 | Stop reason: HOSPADM

## 2017-11-10 RX ORDER — SODIUM CHLORIDE 9 MG/ML
50 INJECTION, SOLUTION INTRAVENOUS CONTINUOUS
Status: DISCONTINUED | OUTPATIENT
Start: 2017-11-10 | End: 2017-11-10 | Stop reason: HOSPADM

## 2017-11-10 RX ORDER — MIDAZOLAM HYDROCHLORIDE 1 MG/ML
.25-5 INJECTION, SOLUTION INTRAMUSCULAR; INTRAVENOUS
Status: DISCONTINUED | OUTPATIENT
Start: 2017-11-10 | End: 2017-11-10 | Stop reason: HOSPADM

## 2017-11-10 RX ORDER — PROPOFOL 10 MG/ML
INJECTION, EMULSION INTRAVENOUS AS NEEDED
Status: DISCONTINUED | OUTPATIENT
Start: 2017-11-10 | End: 2017-11-10 | Stop reason: HOSPADM

## 2017-11-10 RX ADMIN — PROPOFOL 70 MG: 10 INJECTION, EMULSION INTRAVENOUS at 09:58

## 2017-11-10 RX ADMIN — LIDOCAINE HYDROCHLORIDE 40 MG: 20 INJECTION, SOLUTION EPIDURAL; INFILTRATION; INTRACAUDAL; PERINEURAL at 09:56

## 2017-11-10 RX ADMIN — PROPOFOL 30 MG: 10 INJECTION, EMULSION INTRAVENOUS at 10:01

## 2017-11-10 RX ADMIN — SODIUM CHLORIDE: 9 INJECTION, SOLUTION INTRAVENOUS at 09:44

## 2017-11-10 RX ADMIN — PROPOFOL 50 MG: 10 INJECTION, EMULSION INTRAVENOUS at 10:05

## 2017-11-10 NOTE — ANESTHESIA POSTPROCEDURE EVALUATION
Post-Anesthesia Evaluation and Assessment    Patient: Mitchell Greenfield MRN: [id number]  SSN: [id number]    YOB: 1950  Age: 69 y.o. Sex: female       Cardiovascular Function/Vital Signs  Visit Vitals    /69    Pulse 65    Temp 36.7 °C (98.1 °F)    Resp 15    Ht 4' 9\" (1.448 m)    Wt 52.2 kg (115 lb)    SpO2 100%    BMI 24.89 kg/m2       Patient is status post MAC anesthesia for Procedure(s):  ESOPHAGOGASTRODUODENOSCOPY (EGD)  ESOPHAGOGASTRODUODENAL (EGD) BIOPSY. Nausea/Vomiting: None    Postoperative hydration reviewed and adequate. Pain:  Pain Scale 1: Numeric (0 - 10) (11/10/17 1038)  Pain Intensity 1: 0 (11/10/17 1038)   Managed    Neurological Status: At baseline    Mental Status and Level of Consciousness: Arousable    Pulmonary Status:   O2 Device: Room air (11/10/17 1038)   Adequate oxygenation and airway patent    Complications related to anesthesia: None    Post-anesthesia assessment completed.  No concerns    Signed By: Lonny Felipe MD     November 10, 2017

## 2017-11-10 NOTE — DISCHARGE INSTRUCTIONS
Stephanie Velazquez M.D.  [phone]           11/10/2017  Sander Dodson  :  1950  [facility] Medical Record Number:  [id number]        ENDOSCOPY FINDINGS:   Your endoscopy showed a short segment of Gama's esophagus and small size hiatal hernia, otherwise appeared within normal.      EGD DISCHARGE INSTRUCTIONS    DISCOMFORT:  Sore throat- throat lozenges or warm salt water gargle  redness at IV site- apply warm compress to area; if redness or soreness persist- contact your physician  Gaseous discomfort- walking, belching will help relieve any discomfort  You may not operate a vehicle for 12 hours  You may not engage in an occupation involving machinery or appliances for rest of today  You may not drink alcoholic beverages for at least 12 hours  Avoid making any critical decisions for at least 24 hour    DIET:   You may resume your regular diet. Continue with anti-reflux measures. ACTIVITY  Spend the remainder of the day resting -  avoid any strenuous activity. Avoid driving or operating machinery. CALL M.D. ANY SIGN OF   Increasing pain, nausea, vomiting  Abdominal distension (swelling)  New increased bleeding (oral or rectal)  Fever (chills)  Pain in chest area  Bloody discharge from nose or mouth  Shortness of breath    Follow-up Instructions:   Call Dr. Kai Ramirez for any questions or problems. Telephone # [phone]  Biopsies were obtained, the results will be available  in  5 to 7 days. We will call you to notify you of these results. Continue same medications. Follow up in the office.

## 2017-11-10 NOTE — ANESTHESIA PREPROCEDURE EVALUATION
Anesthetic History   No history of anesthetic complications            Review of Systems / Medical History  Patient summary reviewed and pertinent labs reviewed    Pulmonary  Within defined limits                 Neuro/Psych   Within defined limits           Cardiovascular    Hypertension              Exercise tolerance: >4 METS     GI/Hepatic/Renal           Hiatal hernia    Comments: Barretts Esophagus Endo/Other        Arthritis     Other Findings            Physical Exam    Airway  Mallampati: II  TM Distance: 4 - 6 cm  Neck ROM: normal range of motion   Mouth opening: Normal     Cardiovascular    Rhythm: regular  Rate: normal         Dental    Dentition: Caps/crowns, Lower dentition intact and Upper dentition intact     Pulmonary  Breath sounds clear to auscultation               Abdominal  GI exam deferred       Other Findings            Anesthetic Plan    ASA: 2  Anesthesia type: MAC            Anesthetic plan and risks discussed with: Patient

## 2017-11-10 NOTE — H&P
The patient is a 69year old female who presents with a complaint of Gama's esophagus. The patient presents consultation at the request of Dr. Fadi Ma). Note for \"Gama's esophagus\": She had an endoscopy in May 2014 in [place] and was diagnosed with Gama's esophagus and a hiatal hernia, and was started on omeprazole daily. She then moved to [place] and was weaned off the omeprazole. She reports doing ok except at night she gets symptoms if she eats late. She had a repeat colonoscopy the same day for polyp surveillance from  and her colonoscopy in  showed no polyps. She currenty denies any lower GI symptoms. Problem List/Past Medical (Carmel Valderrama; 10/16/2017 10:49 AM)  Hypertension    Hypercholesterolemia    Dry eyes (375.15  H04.123)    Ocular hypertension (365.04  H40.059)    Gama's Esophagus    GERD (gastroesophageal reflux disease) (530.81  K21.9)    Blepharochalasis (374.34  H02.30)    History of colon polyps (V12.72  Z86.010)    Arthritis      Past Surgical History (Carmel Valderrama; 10/16/2017 10:49 AM)   Delivery   x2  Tubal Ligation    Tonsillectomy      Allergies (Daline Roelofse; 10/16/2017 10:48 AM)  Codeine/Codeine Derivatives   Vomiting. Keflex *CEPHALOSPORINS*   Vomiting. Medication History (Dorcas Roelofse; 10/16/2017 10:54 AM)  Restasis  (0.05% Emulsion, Ophthalmic) Active. Lisinopril  (20MG Tablet, 1 Oral daily) Active. Latanoprost  (0.005% Solution, Ophthalmic) Active. Simvastatin  (10MG Tablet, 1 Oral daily) Active. Medications Reconciled     Family History (Carmel Valderrama; 10/16/2017 10:49 AM)  Colon Polyps   Sister. Heart attack (410.90  I21.9)   Father. Alzheimer's disease   Mother. Hodgkin's Disease   Sister. age 44 passed  Lung Cancer   Father. age 71 passed  Non Hodgkin's lymphoma (202.80  C85.90)   Sister. Social History (Carmel Valderrama; 10/16/2017 10:49 AM)  Alcohol Use   Occasional alcohol use, Drinks wine.   Marital status   . Tobacco Use   Never smoker. Employment status   Retired. Blood Transfusion   No.    Diagnostic Studies History ([facility]; 10/16/2017 10:49 AM)  Colonoscopy   Date: 5/2014. Endoscopy      Health Maintenance History ([facility]; 10/16/2017 10:48 AM)  Flu Vaccine   Date: 10/2016. Pneumovax   Date: 2016. Review of Systems ([facility]; 10/16/2017 10:48 AM)  General Not Present- Chronic Fatigue, Poor Appetite, Weight Gain and Weight Loss. Skin Not Present- Itching, Rash and Skin Color Changes. HEENT Not Present- Hearing Loss and Vertigo. Respiratory Not Present- Difficulty Breathing and TB exposure. Cardiovascular Present- Hypertension. Not Present- Chest Pain, Use of Antibiotics before Dental Procedures and Use of Blood Thinners. Gastrointestinal Present- See HPI. Musculoskeletal Present- Arthritis. Not Present- Hip Replacement Surgery and Knee Replacement Surgery. Neurological Not Present- Weakness. Psychiatric Present- Difficulty sleeping. Not Present- Depression. Endocrine Not Present- Diabetes and Thyroid Problems. Hematology Not Present- Anemia. Vitals ([facility]; 10/16/2017 10:54 AM)  10/16/2017 10:49 AM  Weight: 116 lb   Height: 57 in   Body Surface Area: 1.43 m²   Body Mass Index: 25.1 kg/m²    Temp.: 99.1° F (Temporal)    Pulse: 60 (Regular)    Resp.: 18 (Unlabored)     BP: 154/78 (Sitting, Left Arm, Standard)              Physical Exam (Jimmy Salas MD; 10/16/2017 11:40 AM)  General  Mental Status - Alert. General Appearance - Cooperative, Pleasant, Not in acute distress. Orientation - Oriented X3. Build & Nutrition - Well nourished and Well developed. Integumentary  General Characteristics  Overall examination of the patient's skin reveals - no rashes, no bruises and no spider angiomas. Color - normal coloration of skin.     Head and Neck  Neck  Global Assessment - full range of motion and supple, no bruit auscultated on the right, no bruit auscultated on the left, non-tender, no lymphadenopathy. Thyroid  Gland Characteristics - normal size and consistency. Eye  Eyeball - Left - No Exophthalmos. Eyeball - Right - No Exophthalmos. Sclera/Conjunctiva - Left - No Jaundice. Sclera/Conjunctiva - Right - No Jaundice. Chest and Lung Exam  Chest and lung exam reveals  - quiet, even and easy respiratory effort with no use of accessory muscles. Auscultation  Breath sounds - Normal. Adventitious sounds - No Adventitious sounds. Cardiovascular  Auscultation  Rhythm - Regular, No Tachycardia, No Bradycardia . Heart Sounds - Normal heart sounds , S1 WNL and S2 WNL, No S3, No Summation Gallop. Murmurs & Other Heart Sounds - Auscultation of the heart reveals - No Murmurs. Abdomen  Inspection  Inspection of the abdomen reveals - Non-distended. Palpation/Percussion  Tenderness - Non-Tender. Rebound tenderness - No rebound. Liver - No hepatosplenomegaly. Abdominal Mass Palpable - No masses. Other Characteristics - No Ascites. Organomegally - None. Auscultation  Auscultation of the abdomen reveals - Bowel sounds normal, No Abdominal bruits and No Succussion splash. Rectal - Did not examine. Peripheral Vascular  Upper Extremity  Inspection - Left - Normal - No Clubbing, No Cyanosis, No Edema, Pulses Intact. Right - Normal - No Clubbing, No Cyanosis, No Edema, Pulses Intact. Palpation - Edema - Left - No edema. Right - No edema. Lower Extremity  Inspection - Left - Inspection Normal. Right - Inspection Normal. Palpation - Edema - Left - No edema. Right - No edema. Neurologic  Neurologic evaluation reveals  - Cranial nerves grossly intact, no focal neurologic deficits. Motor  Involuntary Movements - Asterixis - not present. Musculoskeletal  Global Assessment  Gait and Station - normal gait and station. Assessment & Plan (Jimmy Cates MD; 10/16/2017 10:32 PM)  Gama's Esophagus  Impression: Diagnosed in 2014, due for surveillance.  With her risk of osteoporosis, would avoid using a PPI for now and rather used [id number]. Will plan for EGD with biopsies. GERD (gastroesophageal reflux disease) (530.81  K21.9)  Current Plans  Endoscopy (42469) (Discussed risks and benefits with the patient to include: perforation, post polypectomy, or post biopsy bleeding, missed lesions, and sedation reactions.)  Pt Education - How to access health information online: discussed with patient and provided information. Patient is to call me for any questions or concerns. History of colonic polyps (V12.72  Z86.010)  Impression: Will need repeat colonoscopy in 5 years from last one which will be in 2019. Current Plans  Discussed the risks and benefits of colonoscopy with the patient.

## 2017-11-10 NOTE — PROCEDURES
Sahil Powers M.D.  [phone]           11/10/2017                EGD Operative Report  Qi Stable  :  1950  [facility] Medical Record Number:  [id number]      Indication:  Mckeon's/esophageal ulcer, GERD     : Kenan Friedman MD    Referring Provider:  Rubi Day MD      Anesthesia/Sedation:  MAC anesthesia    Airway assessment: No airway problems anticipated    Pre-Procedural Exam:      Airway: clear, no airway problems anticipated  Heart: RRR, without gallops or rubs  Lungs: clear bilaterally without wheezes, crackles, or rhonchi  Abdomen: soft, nontender, nondistended, bowel sounds present  Mental Status: awake, alert and oriented to person, place and time       Procedure Details     After infomed consent was obtained for the procedure, with all risks and benefits of procedure explained the patient was taken to the endoscopy suite and placed in the left lateral decubitus position. Following sequential administration of sedation as per above, the endoscope was inserted into the mouth and advanced under direct vision to second portion of the duodenum. A careful inspection was made as the gastroscope was withdrawn, including a retroflexed view of the proximal stomach; findings and interventions are described below. Findings:   Esophagus:Evidence of irregular Z-line and 2 mucosal tongues of salmon colored mucosa seen arising from the GE-junction suggestive of mckeon's esophagus, otherwise mucosa appeared within normal.  Stomach: Small size hiatal hernia, otherwise gastric mucosa within normal  Duodenum/jejunum: normal    Therapies:  none    Specimens: GE-junction           Complications:   None; patient tolerated the procedure well. EBL:  None. Impression:    Small size hiatal hernia and two short mucosal tongues of probable Mckeon's esophagus. Recommendations:    -Continue acid suppression.  -Await pathology.   -GERD diet: avoid fried and fatty foods. peppermint, chocolate, alcohol, coffee, citrus fruits and juices, tomoato products; avoid lying down for 2 to 3 hours after eating.     Edwar Tejeda MD

## 2017-11-10 NOTE — ROUTINE PROCESS
Mitchell Greenfield  1950  [id number]    Situation:  Verbal report received from: Luis Manuel Xiong RN  Procedure: Procedure(s):  ESOPHAGOGASTRODUODENOSCOPY (EGD)  ESOPHAGOGASTRODUODENAL (EGD) BIOPSY    Background:    Preoperative diagnosis: Gama's  Postoperative diagnosis: * No post-op diagnosis entered *    :  Dr. Zan Pham  Assistant(s): Endoscopy Technician-1: Angelito López  Endoscopy RN-1: Nasim Zaragoza RN    Specimens:   ID Type Source Tests Collected by Time Destination   1 : elena Chacko MD 11/10/2017 1001 Pathology     H. Pylori  no    Assessment:  Intra-procedure medications     Anesthesia gave intra-procedure sedation and medications, see anesthesia flow sheet yes    Intravenous fluids: NS@ KVO     Vital signs stable     Abdominal assessment: round and soft     Recommendation:  Discharge patient per MD order.   Family or Friend   Permission to share finding with family or friend yes

## 2017-11-10 NOTE — IP AVS SNAPSHOT
Griseldadiana Mayers 
 
 
 [address] [place] [address] 
[phone] Patient: Sue Escobar MRN: [id number] About your hospitalization You were admitted on:  November 10, 2017 You last received care in the:  [facility] ENDOSCOPY You were discharged on:  November 10, 2017 Why you were hospitalized Your primary diagnosis was:  Not on File Discharge Orders None A check nakita indicates which time of day the medication should be taken. My Medications TAKE these medications as instructed Instructions Each Dose to Equal  
 Morning Noon Evening Bedtime  
 latanoprost 0.005 % ophthalmic solution Commonly known as:  Melanie Wilver Your last dose was: Your next dose is:    
   
   
 Administer 1 Drop to left eye nightly. 1 Drop  
    
   
   
   
  
 lisinopril 20 mg tablet Commonly known as:  Bina Hu Your last dose was: Your next dose is: TAKE ONE TABLET BY MOUTH DAILY RESTASIS 0.05 % ophthalmic emulsion Generic drug:  cycloSPORINE Your last dose was: Your next dose is:    
   
   
      
   
   
   
  
 simvastatin 10 mg tablet Commonly known as:  ZOCOR Your last dose was: Your next dose is: Take 1 Tab by mouth nightly. 10 mg Discharge Instructions Jensen Hardy M.D. 
[phone] 
        
11/10/2017 Sue Escobar :  1950 [facility] Record Number:  [id number] ENDOSCOPY FINDINGS: 
 Your endoscopy showed a short segment of Gama's esophagus and small size hiatal hernia, otherwise appeared within normal. 
 
 
EGD DISCHARGE INSTRUCTIONS DISCOMFORT: 
Sore throat- throat lozenges or warm salt water gargle 
redness at IV site- apply warm compress to area; if redness or soreness persist- contact your physician Gaseous discomfort- walking, belching will help relieve any discomfort You may not operate a vehicle for 12 hours You may not engage in an occupation involving machinery or appliances for rest of today You may not drink alcoholic beverages for at least 12 hours Avoid making any critical decisions for at least 24 hour DIET: 
 You may resume your regular diet. Continue with anti-reflux measures. ACTIVITY Spend the remainder of the day resting -  avoid any strenuous activity. Avoid driving or operating machinery. CALL M.D. ANY SIGN OF Increasing pain, nausea, vomiting Abdominal distension (swelling) New increased bleeding (oral or rectal) Fever (chills) Pain in chest area Bloody discharge from nose or mouth Shortness of breath Follow-up Instructions: 
 Call Dr. Enrike Henry for any questions or problems. Telephone # [phone] Biopsies were obtained, the results will be available  in  5 to 7 days. We will call you to notify you of these results. Continue same medications. Follow up in the office. ACO Transitions of Care Introducing [facility] [address] [place] [address] offers a voluntary care coordination program to provide high quality service and care to [facility] fee-for-service beneficiaries. Ethel Cullen was designed to help you enhance your health and well-being through the following services: ? Transitions of Care  support for individuals who are transitioning from one care setting to another (example: Hospital to home). ? Chronic and Complex Care Coordination  support for individuals and caregivers of those with serious or chronic illnesses or with more than one chronic (ongoing) condition and those who take a number of different medications.   
 
 
If you meet specific medical criteria, a [address] [place] [address] Rd may call you directly to coordinate your care with your primary care physician and your other care providers. For questions about the [facility] programs, please, contact your physicians office. For general questions or additional information about Accountable Care Organizations: 
Please visit www.medicare.gov/acos. html or call 1-800-MEDICARE (2-130.460.8083) TTY users should call 5-341.422.4299. Introducing [facility] SERVICES! Dear Ky Woodard: Thank you for requesting a Pososhok.ru account. Our records indicate that you already have an active [email or web address] account. You can access your account anytime at [email or web address] Did you know that you can access your hospital and ER discharge instructions at any time in [email or web address]? You can also review all of your test results from your hospital stay or ER visit. Additional Information If you have questions, please visit the Frequently Asked Questions section of the [email or web address] website at [email or web address] Remember, [email or web address] is NOT to be used for urgent needs. For medical emergencies, dial 911. Now available from your iPhone and Android! Providers Seen During Your Hospitalization Provider Specialty Primary office phone Sony Franco MD Gastroenterology [phone] Your Primary Care Physician (PCP) Primary Care Physician Office Phone Office Fax Valdo Napoles [phone] You are allergic to the following Allergen Reactions Codeine Nausea and Vomiting Keflex (Cephalexin) Nausea and Vomiting Recent Documentation Height Weight BMI OB Status Smoking Status 1.448 m 52.2 kg 24.89 kg/m2 Postmenopausal Never Smoker Emergency Contacts Name Discharge Info Relation Home Work Mobile [facility] DISCHARGE CAREGIVER [3] Daughter [21] [phone] Patient Belongings The following personal items are in your possession at time of discharge: Dental Appliances: None  Visual Aid: Glasses Please provide this summary of care documentation to your next provider. Signatures-by signing, you are acknowledging that this After Visit Summary has been reviewed with you and you have received a copy. Patient Signature:  ____________________________________________________________ Date:  ____________________________________________________________  
  
Faina Clare Provider Signature:  ____________________________________________________________ Date:  ____________________________________________________________

## 2017-11-10 NOTE — PERIOP NOTES
Endoscope was pre-cleaned at bedside immediately following procedure by [address] [place] [address].

## 2018-02-01 ENCOUNTER — TELEPHONE (OUTPATIENT)
Dept: INTERNAL MEDICINE CLINIC | Age: 68
End: 2018-02-01

## 2018-02-01 NOTE — TELEPHONE ENCOUNTER
Pt needs referral/recommendations to Dermatologist (itchy scalp) and ENT (sinus check)     Also, pt needs to have the following information added to her chart:   Flu shot 01/15/27177   Endoscopy 11/10/2017     Best contact #: [phone] (h) [phone] (c)              From answering service

## 2018-02-05 ENCOUNTER — TELEPHONE (OUTPATIENT)
Dept: INTERNAL MEDICINE CLINIC | Age: 68
End: 2018-02-05

## 2018-02-05 NOTE — TELEPHONE ENCOUNTER
Patient states that head is itching and would like to know what dermatologist she can go to. Would like to be called back asap as she has waited since Thursday. Please contact at [phone].

## 2018-02-22 ENCOUNTER — OFFICE VISIT (OUTPATIENT)
Dept: INTERNAL MEDICINE CLINIC | Age: 68
End: 2018-02-22

## 2018-02-22 VITALS
SYSTOLIC BLOOD PRESSURE: 150 MMHG | DIASTOLIC BLOOD PRESSURE: 60 MMHG | HEART RATE: 70 BPM | RESPIRATION RATE: 16 BRPM | HEIGHT: 57 IN | BODY MASS INDEX: 24.81 KG/M2 | WEIGHT: 115 LBS | OXYGEN SATURATION: 98 %

## 2018-02-22 DIAGNOSIS — G47.30 SLEEP APNEA IN ADULT: ICD-10-CM

## 2018-02-22 DIAGNOSIS — E78.5 INCREASED SERUM LIPIDS: ICD-10-CM

## 2018-02-22 DIAGNOSIS — G24.5 BLEPHAROSPASM: ICD-10-CM

## 2018-02-22 DIAGNOSIS — I10 HTN (HYPERTENSION), BENIGN: Primary | ICD-10-CM

## 2018-02-22 RX ORDER — SIMVASTATIN 10 MG/1
10 TABLET, FILM COATED ORAL
Qty: 90 TAB | Refills: 2 | Status: SHIPPED | OUTPATIENT
Start: 2018-02-22 | End: 2018-08-30 | Stop reason: SINTOL

## 2018-02-22 RX ORDER — CLOBETASOL PROPIONATE 0.46 MG/ML
SOLUTION TOPICAL
COMMUNITY
Start: 2018-02-06 | End: 2019-01-08 | Stop reason: ALTCHOICE

## 2018-02-22 RX ORDER — LISINOPRIL 20 MG/1
TABLET ORAL
Qty: 90 TAB | Refills: 2 | Status: SHIPPED | OUTPATIENT
Start: 2018-02-22 | End: 2018-11-25 | Stop reason: SDUPTHER

## 2018-02-22 RX ORDER — TRIAMCINOLONE ACETONIDE 1 MG/G
CREAM TOPICAL
COMMUNITY
Start: 2018-02-06 | End: 2019-01-08 | Stop reason: ALTCHOICE

## 2018-02-22 NOTE — PROGRESS NOTES
Coordination of Care Questions    1. Have you been to the ER, urgent care clinic since your last visit? No       Hospitalized since your last visit? No    2. Have you seen or consulted any other health care providers outside of the [address] [place] [address] since your last visit? Include any pap smears or colon screening.  No

## 2018-02-22 NOTE — PROGRESS NOTES
SUBJECTIVE: Manohar Baptiste is a 69 y.o. female seen for a follow up visit; she has hypertension and hyperlipidemia. Current Outpatient Prescriptions   Medication Sig Dispense Refill    clobetasol (TEMOVATE) 0.05 % external solution       triamcinolone acetonide (KENALOG) 0.1 % topical cream       lisinopril (PRINIVIL, ZESTRIL) 20 mg tablet TAKE ONE TABLET BY MOUTH DAILY 90 Tab 2    simvastatin (ZOCOR) 10 mg tablet Take 1 Tab by mouth nightly. 90 Tab 2    latanoprost (XALATAN) 0.005 % ophthalmic solution Administer 1 Drop to left eye nightly. 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 Home BP reviewed < 130 all the time  New concerns sleep study for jericho eval neuro may set up or use [facility] has fatigue frequent awakenings     Results from Hospital Encounter encounter on 09/21/16   DEXA BONE DENSITY STUDY AXIAL   Narrative Bone Mineral Density    Indication:  screening  Age: 74  Sex: Female. Menopause status: postmenopausal.  Hormone replacement therapy: No     Number of falls in the past year:   1   Risk factors for osteoporosis:  None. Current medication for osteoporosis: None. Comparison: None. Technique: Imaging was performed on the Infinite.ly      Excluded sites: None     Findings:      Femoral Neck:  Left  Bone mineral density (gm/cm2):?  0.673  % of peak bone mass: 65  % for age matched controls:? 80  T-score: -2.6  Z-score: -0.7    Total Hip: Left  Bone mineral density (gm/cm2):  0.870  % of peak bone mass:   86  % for age matched controls:  110  T-score:   -1.1  Z-score:  0.6    Lumbar Spine:  L1-L4  Bone mineral density (gm/cm2):  0.863  % of peak bone mass:  73   % for age matched controls:  80  T-score:  -2.7  Z-score:  -0.5    The T score for the left ultra distal radius is -2.5. The T score for the left  distal third radius is -1.8. Impression Impression: This patient is osteoporotic using the World Health Organization criteria      Recommendations:  Therapy recommendations need to be tailored to each individual patient. Please reconsider testing based on risk factors. Currently, Medicare will only  reimburse for a central DXA examination every two years, unless the patient is  on chronic glucocorticoid therapy. Note: Please note that reliable, valid comparisons cannot be made between  studies which have been performed on machines from different manufacturers. If  clinically warranted, a follow up study performed at this site, on the same  unit, would allow the most sensitive assessment of change in bone mineral  density. . Patient does exercise daily. Family history is positive for osteoporosis. Age at menopause 46. Patient does not smoke. Previous treatment calcium with vitamin D 1200mg daily and was on evista for a short while. .     Vitals:    02/22/18 0835 02/22/18 0911   BP: 164/84 150/60   Pulse: 70    Resp: 16    SpO2: 98%    Weight: 115 lb (52.2 kg)    Height: 4' 9\" (1.448 m)        OBJECTIVE:  Visit Vitals    /84    Pulse 70    Resp 16    Ht 4' 9\" (1.448 m)    Wt 115 lb (52.2 kg)    SpO2 98%    BMI 24.89 kg/m2      Appearance: alert, well appearing, and in no distress, oriented to person, place, and time and normal appearing weight.   General exam: CVS exam BP noted to be borderline elevated today in office, S1, S2 normal, no gallop, no murmur, chest clear, no JVD, no HSM, no edema, peripheral vascular exam both carotids normal upstroke without bruits, neurological exam alert, oriented, normal speech, no focal findings or movement disorder noted, normal muscle tone,   Lab review: most recent lipid panel reviewed, showing LDL result meets goal.     ASSESSMENT:  hypertension stable, reasonably well controlled, needs further observation. PLAN:  current treatment plan is effective, no change in therapy  lab results and schedule of future lab studies reviewed with patient  repeat labs ordered prior to next appointment. nt  Will   Repeat BMD 2018  Weight bearing  Calcium supplement 500 mg daily with Vit D  600 to 1000 units daily advised for bone protection    Hypertension controlled for age based on guidelines    Diagnoses and all orders for this visit:    1. HTN (hypertension), benign    2. Increased serum lipids  -     simvastatin (ZOCOR) 10 mg tablet; Take 1 Tab by mouth nightly. 3. Blepharospasm    Other orders  -     lisinopril (PRINIVIL, ZESTRIL) 20 mg tablet; TAKE ONE TABLET BY MOUTH DAILY    Diagnoses and all orders for this visit:    1. HTN (hypertension), benign    2. Increased serum lipids  -     simvastatin (ZOCOR) 10 mg tablet; Take 1 Tab by mouth nightly. 3. Blepharospasm    4.  Sleep apnea in adult  -     REFERRAL TO SLEEP STUDIES    Other orders  -     lisinopril (PRINIVIL, ZESTRIL) 20 mg tablet; TAKE ONE TABLET BY MOUTH DAILY

## 2018-02-22 NOTE — MR AVS SNAPSHOT
[address] [place] [address] [address] [facility] 
[phone] Patient: Gia Regan MRN: [id number] CT Visit Information Date & Time Provider Department Dept. Phone Encounter #  
 2018  8:15 AM Jose Alberto Doyle MD [facility] [phone] Your Appointments 3/22/2018  9:00 AM  
ESTABLISHED PATIENT with Yara Duran MD  
[facility] ([address]) Appt Note: AE, pt declined mammo [address] Count includes the [facility] [address]  
[phone]  
  
   
 [address] [address]  
  
    
 2018 11:00 AM  
ROUTINE CARE with Jose Alberto Doyle MD  
[facility] [address]) Appt Note: R F/U  
 [place] [address] [facility] [address]  
[address] [facility] [address] [facility] [address] [address] Upcoming Health Maintenance Date Due DTaP/Tdap/Td series (1 - Tdap) 1971 GLAUCOMA SCREENING Q2Y 2017 MEDICARE YEARLY EXAM 2018 BREAST CANCER SCRN MAMMOGRAM 2019 COLONOSCOPY 2020 Allergies as of 2018  Review Complete On: 2018 By: Rita Sinha LPN Severity Noted Reaction Type Reactions Codeine  10/14/2015    Nausea and Vomiting Keflex [Cephalexin]  10/14/2015    Nausea and Vomiting Current Immunizations  Reviewed on 2017 Name Date Influenza Vaccine 10/1/2016 Influenza Vaccine (Quad) PF 10/14/2015 Pneumococcal Conjugate (PCV-13) 2016 Tdap 10/12/2016 Zoster Vaccine, Live 10/12/2010 Not reviewed this visit You Were Diagnosed With   
  
 Codes Comments HTN (hypertension), benign    -  Primary ICD-10-CM: I10 
ICD-9-CM: 401.1 Increased serum lipids     ICD-10-CM: E78.5 ICD-9-CM: 272.4 Blepharospasm     ICD-10-CM: G24.5 ICD-9-CM: 333.81   
 Sleep apnea in adult     ICD-10-CM: G47.30 ICD-9-CM: 327.23 Vitals BP Pulse Resp Height(growth percentile) Weight(growth percentile) SpO2  
 150/60 70 16 4' 9\" (1.448 m) 115 lb (52.2 kg) 98% BMI OB Status Smoking Status 24.89 kg/m2 Postmenopausal Never Smoker Vitals History BMI and BSA Data Body Mass Index Body Surface Area  
 24.89 kg/m 2 1.45 m 2 Preferred Pharmacy Pharmacy Name Phone [place] [address] [address] [facility], [address] [phone] Your Updated Medication List  
  
   
This list is accurate as of 2/22/18  9:17 AM.  Always use your most recent med list.  
  
  
  
  
 clobetasol 0.05 % external solution Commonly known as:  TEMOVATE  
  
 latanoprost 0.005 % ophthalmic solution Commonly known as:  Michael Helm Administer 1 Drop to left eye nightly. lisinopril 20 mg tablet Commonly known as:  PRINIVIL, ZESTRIL  
TAKE ONE TABLET BY MOUTH DAILY RESTASIS 0.05 % ophthalmic emulsion Generic drug:  cycloSPORINE  
  
 simvastatin 10 mg tablet Commonly known as:  ZOCOR Take 1 Tab by mouth nightly. triamcinolone acetonide 0.1 % topical cream  
Commonly known as:  KENALOG Prescriptions Sent to Pharmacy Refills  
 lisinopril (PRINIVIL, ZESTRIL) 20 mg tablet 2 Sig: TAKE ONE TABLET BY MOUTH DAILY Class: Normal  
 Pharmacy: [address], [place] [facility] [address] [facility] Ph #: [phone]  
 simvastatin (ZOCOR) 10 mg tablet 2 Sig: Take 1 Tab by mouth nightly. Class: Normal  
 Pharmacy: [place] [address] [address] [facility], [address] [facility], [address] Ph #: [phone] Route: Oral  
  
We Performed the Following REFERRAL TO SLEEP STUDIES [REF99 Custom] Referral Information Referral ID Referred By Referred To  
  
 [id number] James Angelo MD   
   [address] [facility] Phone: [phone] Fax: [phone] Visits Status Start Date End Date 1 New Request 2/22/18 2/22/19 If your referral has a status of pending review or denied, additional information will be sent to support the outcome of this decision. Introducing [facility]! Dear Virgie Severe: Thank you for requesting a easy2map account. Our records indicate that you already have an active easy2map account. You can access your account anytime at [email or web address] Did you know that you can access your hospital and ER discharge instructions at any time in easy2map? You can also review all of your test results from your hospital stay or ER visit. Additional Information If you have questions, please visit the Frequently Asked Questions section of the [email or web address] website at [email or web address] Remember, easy2map is NOT to be used for urgent needs. For medical emergencies, dial 911. Now available from your iPhone and Android! Please provide this summary of care documentation to your next provider. Your primary care clinician is listed as Paty Rivera. If you have any questions after today's visit, please call [phone].

## 2018-02-27 ENCOUNTER — OFFICE VISIT (OUTPATIENT)
Dept: SLEEP MEDICINE | Age: 68
End: 2018-02-27

## 2018-02-27 VITALS
SYSTOLIC BLOOD PRESSURE: 139 MMHG | OXYGEN SATURATION: 98 % | WEIGHT: 114 LBS | HEART RATE: 74 BPM | BODY MASS INDEX: 24.59 KG/M2 | HEIGHT: 57 IN | DIASTOLIC BLOOD PRESSURE: 83 MMHG

## 2018-02-27 DIAGNOSIS — I10 ESSENTIAL HYPERTENSION: ICD-10-CM

## 2018-02-27 DIAGNOSIS — G47.33 OBSTRUCTIVE SLEEP APNEA SYNDROME: Primary | ICD-10-CM

## 2018-02-27 NOTE — PATIENT INSTRUCTIONS
[address] [facility] Ray. [place], [address]  Tel.  [phone]  Fax. 100 [facility] 60  [address] [facility] [address], [address]  Tel.  [phone]  Fax. [phone] [address]  Tel.  [phone]  Fax. [phone]     Sleep Apnea: After Your Visit  Your Care Instructions  Sleep apnea occurs when you frequently stop breathing for 10 seconds or longer during sleep. It can be mild to severe, based on the number of times per hour that you stop breathing or have slowed breathing. Blocked or narrowed airways in your nose, mouth, or throat can cause sleep apnea. Your airway can become blocked when your throat muscles and tongue relax during sleep. Sleep apnea is common, occurring in 1 out of 20 individuals. Individuals having any of the following characteristics should be evaluated and treated right away due to high risk and detrimental consequences from untreated sleep apnea:  1. Obesity  2. Congestive Heart failure  3. Atrial Fibrillation  4. Uncontrolled Hypertension  5. Type II Diabetes  6. Night-time Arrhythmias  7. Stroke  8. Pulmonary Hypertension  9. High-risk Driving Populations (pilots, truck drivers, etc.)  10. Patients Considering Weight-loss Surgery    How do you know you have sleep apnea? You probably have sleep apnea if you answer 'yes' to 3 or more of the following questions:  S - Have you been told that you Snore? T - Are you often Tired during the day? O - Has anyone Observed you stop breathing while sleeping? P- Do you have (or are being treated for) high blood Pressure? B - Are you obese (Body Mass Index > 35)? A - Is your Age 48years old or older? N - Is your Neck size greater than 16 inches? G - Are you male Gender? A sleep physician can prescribe a breathing device that prevents tissues in the throat from blocking your airway.  Or your doctor may recommend using a dental device (oral breathing device) to help keep your airway open. In some cases, surgery may be needed to remove enlarged tissues in the throat. Follow-up care is a key part of your treatment and safety. Be sure to make and go to all appointments, and call your doctor if you are having problems. It's also a good idea to know your test results and keep a list of the medicines you take. How can you care for yourself at home? · Lose weight, if needed. It may reduce the number of times you stop breathing or have slowed breathing. · Go to bed at the same time every night. · Sleep on your side. It may stop mild apnea. If you tend to roll onto your back, sew a pocket in the back of your pajama top. Put a tennis ball into the pocket, and stitch the pocket shut. This will help keep you from sleeping on your back. · Avoid alcohol and medicines such as sleeping pills and sedatives before bed. · Do not smoke. Smoking can make sleep apnea worse. If you need help quitting, talk to your doctor about stop-smoking programs and medicines. These can increase your chances of quitting for good. · Prop up the head of your bed 4 to 6 inches by putting bricks under the legs of the bed. · Treat breathing problems, such as a stuffy nose, caused by a cold or allergies. · Use a continuous positive airway pressure (CPAP) breathing machine if lifestyle changes do not help your apnea and your doctor recommends it. The machine keeps your airway from closing when you sleep. · If CPAP does not help you, ask your doctor whether you should try other breathing machines. A bilevel positive airway pressure machine has two types of air pressureâone for breathing in and one for breathing out. Another device raises or lowers air pressure as needed while you breathe. · If your nose feels dry or bleeds when using one of these machines, talk with your doctor about increasing moisture in the air. A humidifier may help.   · If your nose is runny or stuffy from using a breathing machine, talk with your doctor about using decongestants or a corticosteroid nasal spray. When should you call for help? Watch closely for changes in your health, and be sure to contact your doctor if:  · You still have sleep apnea even though you have made lifestyle changes. · You are thinking of trying a device such as CPAP. · You are having problems using a CPAP or similar machine. Where can you learn more? Go to [employer]. Enter K337 in the search box to learn more about \"Sleep Apnea: After Your Visit. \"   © 0476-9199 Healthwise, Incorporated. Care instructions adapted under license by [employer] (which disclaims liability or warranty for this information). This care instruction is for use with your licensed healthcare professional. If you have questions about a medical condition or this instruction, always ask your healthcare professional. Terressa Edgardo any warranty or liability for your use of this information. PROPER SLEEP HYGIENE    What to avoid  · Do not have drinks with caffeine, such as coffee or black tea, for 8 hours before bed. · Do not smoke or use other types of tobacco near bedtime. Nicotine is a stimulant and can keep you awake. · Avoid drinking alcohol late in the evening, because it can cause you to wake in the middle of the night. · Do not eat a big meal close to bedtime. If you are hungry, eat a light snack. · Do not drink a lot of water close to bedtime, because the need to urinate may wake you up during the night. · Do not read or watch TV in bed. Use the bed only for sleeping and sexual activity. What to try  · Go to bed at the same time every night, and wake up at the same time every morning. Do not take naps during the day. · Keep your bedroom quiet, dark, and cool. · Get regular exercise, but not within 3 to 4 hours of your bedtime. .  · Sleep on a comfortable pillow and mattress.   · If watching the clock makes you anxious, turn it facing away from you so you cannot see the time. · If you worry when you lie down, start a worry book. Well before bedtime, write down your worries, and then set the book and your concerns aside. · Try meditation or other relaxation techniques before you go to bed. · If you cannot fall asleep, get up and go to another room until you feel sleepy. Do something relaxing. Repeat your bedtime routine before you go to bed again. · Make your house quiet and calm about an hour before bedtime. Turn down the lights, turn off the TV, log off the computer, and turn down the volume on music. This can help you relax after a busy day. Drowsy Driving  The [address] [place] [address] Road Traffic Safety Administration cites drowsiness as a causing factor in more than 466,182 police reported crashes annually, resulting in 76,000 injuries and 1,500 deaths. Other surveys suggest 55% of people polled have driven while drowsy in the past year, 23% had fallen asleep but not crashed, 3% crashed, and 2% had and accident due to drowsy driving. Who is at risk? Young Drivers: One study of drowsy driving accidents states that 55% of the drivers were under 25 years. Of those, 75% were male. Shift Workers and Travelers: People who work overnight or travel across time zones frequently are at higher risk of experiencing Circadian Rhythm Disorders. They are trying to work and function when their body is programed to sleep. Sleep Deprived: Lack of sleep has a serious impact on your ability to pay attention or focus on a task. Consistently getting less than the average of 8 hours your body needs creates partial or cumulative sleep deprivation. Untreated Sleep Disorders: Sleep Apnea, Narcolepsy, R.L.S., and other sleep disorders (untreated) prevent a person from getting enough restful sleep. This leads to excessive daytime sleepiness and increases the risk for drowsy driving accidents by up to 7 times.   Medications / Alcohol: Even over the counter medications can cause drowsiness. Medications that impair a drivers attention should have a warning label. Alcohol naturally makes you sleepy and on its own can cause accidents. Combined with excessive drowsiness its effects are amplified. Signs of Drowsy Driving:   * You don't remember driving the last few miles   * You may drift out of your maribel   * You are unable to focus and your thoughts wander   * You may yawn more often than normal   * You have difficulty keeping your eyes open / nodding off   * Missing traffic signs, speeding, or tailgating  Prevention-   Good sleep hygiene, lifestyle and behavioral choices have the most impact on drowsy driving. There is no substitute for sleep and the average person requires 8 hours nightly. If you find yourself driving drowsy, stop and sleep. Consider the sleep hygiene tips provided during your visit as well. Medication Refill Policy: Refills for all medications require 1 week advance notice. Please have your pharmacy fax a refill request. We are unable to fax, or call in \"controled substance\" medications and you will need to pick these prescriptions up from our office. [employer] Activation    Thank you for requesting access to [employer]. Please follow the instructions below to securely access and download your online medical record. [employer] allows you to send messages to your doctor, view your test results, renew your prescriptions, schedule appointments, and more. How Do I Sign Up? 1. In your internet browser, go to [email or web address]. 2. Click on the First Time User? Click Here link in the Sign In box. You will see the New Member Sign Up page. 3. Enter your [employer] Access Code exactly as it appears below. You will not need to use this code after youve completed the sign-up process. If you do not sign up before the expiration date, you must request a new code. [employer] Access Code:  Activation code not generated  Current [employer] Status: Active (This is the date your [employer] access code will )    4. Enter the last four digits of your Social Security Number (xxxx) and Date of Birth (mm/dd/yyyy) as indicated and click Submit. You will be taken to the next sign-up page. 5. Create a Seed[employer]Sparkt ID. This will be your [employer] login ID and cannot be changed, so think of one that is secure and easy to remember. 6. Create a [employer] password. You can change your password at any time. 7. Enter your Password Reset Question and Answer. This can be used at a later time if you forget your password. 8. Enter your e-mail address. You will receive e-mail notification when new information is available in [address] 9. Click Sign Up. You can now view and download portions of your medical record. 10. Click the Download Summary menu link to download a portable copy of your medical information. Additional Information    If you have questions, please call [phone]. Remember, [employer] is NOT to be used for urgent needs. For medical emergencies, dial 911.

## 2018-02-27 NOTE — PROGRESS NOTES
217 [facility]., Ray. [place], [address]  Tel.  [phone]  Fax. 100 [facility] 60  [place], [address] [facility]  Tel.  [phone]  Fax. [phone] [address] Nita [place]   Tel.  [phone]  Fax. [phone]         Subjective:      Aroldo Fajardo is an 69 y.o. female referred for evaluation for a sleep disorder. She complains of frequent nighttime awakenings  associated with snoring and palpitations on waking. Symptoms began 3 years ago, unchanged since that time. She usually can fall asleep in variable minutes. She falls asleep in front of tv. Family or house members note snoring. She denies falling asleep while driving. Aroldo Fjaardo does wake up frequently at night. She is bothered by waking up too early and left unable to get back to sleep. She actually sleeps about 7 (between 6-8 hours) hours at night and wakes up about 4 (between 4-6) times during the night. She does not work shifts: Eloise Garrido indicates she does get too little sleep at night. Her bedtime is 2200 (between 10-11pm). She awakens at 0600 (between 6-7am). She does not take naps. . She has the following observed behaviors:  ;  .  Other remarks:    She feels tired and fuzzy-headed all day. She moved here 3 years ago when her  . She has grandchildren here.   [place] Sleepiness Score: 4      Allergies   Allergen Reactions    Codeine Nausea and Vomiting    Keflex [Cephalexin] Nausea and Vomiting         Current Outpatient Prescriptions:     clobetasol (TEMOVATE) 0.05 % external solution, , Disp: , Rfl:     triamcinolone acetonide (KENALOG) 0.1 % topical cream, , Disp: , Rfl:     lisinopril (PRINIVIL, ZESTRIL) 20 mg tablet, TAKE ONE TABLET BY MOUTH DAILY, Disp: 90 Tab, Rfl: 2    simvastatin (ZOCOR) 10 mg tablet, Take 1 Tab by mouth nightly., Disp: 90 Tab, Rfl: 2    latanoprost (XALATAN) 0.005 % ophthalmic solution, Administer 1 Drop to left eye nightly., Disp: , Rfl:     RESTASIS 0.05 % ophthalmic emulsion, , Disp: , Rfl: 6     She  has a past medical history of Arthritis; Blepharospasm; mammogram (2016); Hyperlipidemia; Hypertension; Neuralgia; Osteoporosis (2016); Routine Papanicolaou smear (2017); and Sicca syndrome ([facility] [address] She  has a past surgical history that includes hx tubal ligation and hx  section. She family history includes Alzheimer in her father and mother; Breast Problems in her maternal aunt; Cancer in her brother and sister; Diabetes in her father; Heart Disease in her father; Other in her sister. She  reports that she has never smoked. She has never used smokeless tobacco. She reports that she drinks about 3.0 oz of alcohol per week  She reports that she does not use illicit drugs. Review of Systems:  Constitutional:  No significant weight loss or weight gain. Eyes:  No blurred vision. CVS:  No significant chest pain, palpitations on waking  Pulm:  No significant shortness of breath  GI:  No significant nausea or vomiting  :  No significant nocturia  Musculoskeletal:  No significant joint pain at night  Skin:  No significant rashes  Neuro:  No significant dizziness   Psych:  No active mood issues    Sleep Review of Systems: notable for some difficulty falling asleep; +frequent awakenings at night;  regular dreaming noted; no nightmares ; + early morning headaches; + memory problems; no concentration issues; no history of any automobile or occupational accidents due to daytime drowsiness. Objective:     Visit Vitals    /83    Pulse 74    Ht 4' 9\" (1.448 m)    Wt 114 lb (51.7 kg)    SpO2 98%    BMI 24.67 kg/m2         General:   Not in acute distress   Eyes:  Anicteric sclerae, no obvious strabismus   Nose:  No obvious nasal septum deviation    Oropharynx:   Class 3 oropharyngeal outlet, ,elongated uvula, low-lying soft palate,    Tonsils:   tonsils are absent   Neck:   Neck circ.  in \"inches\": 12; midline trachea   Chest/Lungs:  Equal lung expansion, clear on auscultation    CVS:  Normal rate, regular rhythm; no JVD   Skin:  Warm to touch; no obvious rashes   Neuro:  No focal deficits ; no obvious tremor    Psych:  Normal affect,  normal countenance;          Assessment:       ICD-10-CM ICD-9-CM    1. Obstructive sleep apnea syndrome G47.33 327.23 POLYSOMNOGRAPHY 1 NIGHT   2. Essential hypertension I10 401.9          Plan:     * The patient currently has a Moderate Risk for having sleep apnea. STOP-BANG score 4.  * PSG was ordered for initial evaluation. We will follow the American Academy of Sleep Medicine protocol regarding split-night procedures and offering a trial of Positive Airway Pressure (CPAP, BPAP, etc.)  * She was provided information on sleep apnea including coresponding risk factors and the importance of proper treatment. * Counseling was provided regarding proper sleep hygiene and safe driving. * Treatment options for sleep apnea were reviewed. she is not against a trial of PAP if found to have significant sleep apnea. The treatment plan was reviewed with the patient in detail and reviewed with the patient and the lead technologist. she understands that the lead technologist will be calling her  with the results and assisting with the next step in the treatment plan as outlined today during the consultation with me. All of her questions were addressed. I have advised a regular sleep wake cycle. she  was advised to avoid looking at the clock during the night. Ideally, the clock face should be turned away. she was advised to minimize caffeine use and to avoid caffeine-containing beverages 8 hours prior to bedtime. Naps were discouraged. A regular exercise schedule, at least 3 hours before bedtime, would be beneficial to improving sleep quality. she was advised to avoid evening light and to use sunglasses in the late afternoon.   Watching TV, using laptops, tablets and smartphones in the evening was discouraged. she  was advised to keep the bedroom cool and dark. She should avoid falling asleep in front of the tv. All of her questions were addressed. 2. Hypertension - she continues on her current regimen. I have reviewed the relationship between hypertension as it relates to sleep-disordered breathing. Thank you for allowing us to participate in your patient's medical care. We'll keep you updated on these investigations.   Kera Miguel MD  Diplomate in Sleep Medicine  [facility]

## 2018-02-27 NOTE — MR AVS SNAPSHOT
[address] [id number] [phone] Patient: Janelle Ochoa MRN: [id number] LRN:[id number] Visit Information Date & Time Provider Department Dept. Phone Encounter #  
 2/27/2018  9:40 AM Dominic Kilgore MD [facility] 53 [id number] Your Appointments 3/22/2018  9:00 AM  
ESTABLISHED PATIENT with Moose Can MD  
[facility] ([facility]) Appt Note: AE, pt declined mammo [address] [place] [address] [address]  
[phone]  
  
   
 [address] [facility] [address]  
  
    
 8/28/2018 11:00 AM  
ROUTINE CARE with Melba Weller MD  
Formerly [facility]) Appt Note: R F/U  
 [place] [address] [place] [address] [facility] [address]  
[address] [facility] 121 [facility] 7 [id number] Upcoming Health Maintenance Date Due  
 GLAUCOMA SCREENING Q2Y 12/11/2017 MEDICARE YEARLY EXAM 8/23/2018 BREAST CANCER SCRN MAMMOGRAM 9/25/2019 COLONOSCOPY 5/30/2020 DTaP/Tdap/Td series (2 - Td) 10/12/2026 Allergies as of 2/27/2018  Review Complete On: 2/27/2018 By: Dominic Kilgore MD  
  
 Severity Noted Reaction Type Reactions Codeine  10/14/2015    Nausea and Vomiting Keflex [Cephalexin]  10/14/2015    Nausea and Vomiting Current Immunizations  Reviewed on 2/23/2017 Name Date Influenza Vaccine 10/1/2016 Influenza Vaccine (Quad) PF 10/14/2015 Pneumococcal Conjugate (PCV-13) 8/12/2016 Tdap 10/12/2016 Zoster Vaccine, Live 10/12/2010 Not reviewed this visit You Were Diagnosed With   
  
 Codes Comments Obstructive sleep apnea syndrome    -  Primary ICD-10-CM: G47.33 
ICD-9-CM: 327.23 Essential hypertension     ICD-10-CM: I10 
ICD-9-CM: 401.9 Vitals BP Pulse Height(growth percentile) Weight(growth percentile) SpO2 BMI  
 139/83 74 4' 9\" (1.448 m) 114 lb (51.7 kg) 98% 24.67 kg/m2 OB Status Smoking Status Postmenopausal Never Smoker Vitals History BMI and BSA Data Body Mass Index Body Surface Area  
 24.67 kg/m 2 1.44 m 2 Preferred Pharmacy Pharmacy Name Phone [facility] [address] [place] [address], [address] [place] [address], [address] [phone] Your Updated Medication List  
  
   
This list is accurate as of 2/27/18 10:19 AM.  Always use your most recent med list.  
  
  
  
  
 clobetasol 0.05 % external solution Commonly known as:  TEMOVATE  
  
 latanoprost 0.005 % ophthalmic solution Commonly known as:  Misbah Kilgore Administer 1 Drop to left eye nightly. lisinopril 20 mg tablet Commonly known as:  PRINIVIL, ZESTRIL  
TAKE ONE TABLET BY MOUTH DAILY RESTASIS 0.05 % ophthalmic emulsion Generic drug:  cycloSPORINE  
  
 simvastatin 10 mg tablet Commonly known as:  ZOCOR Take 1 Tab by mouth nightly. triamcinolone acetonide 0.1 % topical cream  
Commonly known as:  KENALOG To-Do List   
 02/27/2018 Sleep Center:  POLYSOMNOGRAPHY 1 NIGHT Patient Instructions 217 [facility], [place]. [address] [place] [address], [facility] [address] Tel.  [phone] Fax. 100 [facility] [address] Tel.  [phone] Fax. [phone] [address] [facility] 151 [facility]  Tel.  [phone] Fax. [phone] Sleep Apnea: After Your Visit Your Care Instructions Sleep apnea occurs when you frequently stop breathing for 10 seconds or longer during sleep. It can be mild to severe, based on the number of times per hour that you stop breathing or have slowed breathing. Blocked or narrowed airways in your nose, mouth, or throat can cause sleep apnea. Your airway can become blocked when your throat muscles and tongue relax during sleep. Sleep apnea is common, occurring in 1 out of 20 individuals. Individuals having any of the following characteristics should be evaluated and treated right away due to high risk and detrimental consequences from untreated sleep apnea: 
1. Obesity 2. Congestive Heart failure 3. Atrial Fibrillation 4. Uncontrolled Hypertension 5. Type II Diabetes 6. Night-time Arrhythmias 7. Stroke 8. Pulmonary Hypertension 9. High-risk Driving Populations (pilots, truck drivers, etc.) 10. Patients Considering Weight-loss Surgery How do you know you have sleep apnea? You probably have sleep apnea if you answer 'yes' to 3 or more of the following questions: S - Have you been told that you Snore? T - Are you often Tired during the day? O - Has anyone Observed you stop breathing while sleeping? P- Do you have (or are being treated for) high blood Pressure? B - Are you obese (Body Mass Index > 35)? A - Is your Age 48years old or older? N - Is your Neck size greater than 16 inches? G - Are you male Gender? A sleep physician can prescribe a breathing device that prevents tissues in the throat from blocking your airway. Or your doctor may recommend using a dental device (oral breathing device) to help keep your airway open. In some cases, surgery may be needed to remove enlarged tissues in the throat. Follow-up care is a key part of your treatment and safety. Be sure to make and go to all appointments, and call your doctor if you are having problems. It's also a good idea to know your test results and keep a list of the medicines you take. How can you care for yourself at home? · Lose weight, if needed. It may reduce the number of times you stop breathing or have slowed breathing. · Go to bed at the same time every night. · Sleep on your side. It may stop mild apnea. If you tend to roll onto your back, sew a pocket in the back of your paPrivia top.  Put a tennis ball into the pocket, and stitch the pocket shut. This will help keep you from sleeping on your back. · Avoid alcohol and medicines such as sleeping pills and sedatives before bed. · Do not smoke. Smoking can make sleep apnea worse. If you need help quitting, talk to your doctor about stop-smoking programs and medicines. These can increase your chances of quitting for good. · Prop up the head of your bed 4 to 6 inches by putting bricks under the legs of the bed. · Treat breathing problems, such as a stuffy nose, caused by a cold or allergies. · Use a continuous positive airway pressure (CPAP) breathing machine if lifestyle changes do not help your apnea and your doctor recommends it. The machine keeps your airway from closing when you sleep. · If CPAP does not help you, ask your doctor whether you should try other breathing machines. A bilevel positive airway pressure machine has two types of air pressureâone for breathing in and one for breathing out. Another device raises or lowers air pressure as needed while you breathe. · If your nose feels dry or bleeds when using one of these machines, talk with your doctor about increasing moisture in the air. A humidifier may help. · If your nose is runny or stuffy from using a breathing machine, talk with your doctor about using decongestants or a corticosteroid nasal spray. When should you call for help? Watch closely for changes in your health, and be sure to contact your doctor if: 
· You still have sleep apnea even though you have made lifestyle changes. · You are thinking of trying a device such as CPAP. · You are having problems using a CPAP or similar machine. Where can you learn more? Go to MightyNest.be. Enter O308 in the search box to learn more about \"Sleep Apnea: After Your Visit. \"  
© 4760-8768 Healthwise, Incorporated.  Care instructions adapted under license by [facility] (which disclaims liability or warranty for this information). This care instruction is for use with your licensed healthcare professional. If you have questions about a medical condition or this instruction, always ask your healthcare professional. Laquita Solis any warranty or liability for your use of this information. PROPER SLEEP HYGIENE What to avoid · Do not have drinks with caffeine, such as coffee or black tea, for 8 hours before bed. · Do not smoke or use other types of tobacco near bedtime. Nicotine is a stimulant and can keep you awake. · Avoid drinking alcohol late in the evening, because it can cause you to wake in the middle of the night. · Do not eat a big meal close to bedtime. If you are hungry, eat a light snack. · Do not drink a lot of water close to bedtime, because the need to urinate may wake you up during the night. · Do not read or watch TV in bed. Use the bed only for sleeping and sexual activity. What to try · Go to bed at the same time every night, and wake up at the same time every morning. Do not take naps during the day. · Keep your bedroom quiet, dark, and cool. · Get regular exercise, but not within 3 to 4 hours of your bedtime. Elaine Salas · Sleep on a comfortable pillow and mattress. · If watching the clock makes you anxious, turn it facing away from you so you cannot see the time. · If you worry when you lie down, start a worry book. Well before bedtime, write down your worries, and then set the book and your concerns aside. · Try meditation or other relaxation techniques before you go to bed. · If you cannot fall asleep, get up and go to another room until you feel sleepy. Do something relaxing. Repeat your bedtime routine before you go to bed again. · Make your house quiet and calm about an hour before bedtime.  Turn down the lights, turn off the TV, log off the computer, and turn down the volume on music. This can help you relax after a busy day. Drowsy Driving The [place] [address] cites drowsiness as a causing factor in more than 040,726 police reported crashes annually, resulting in 76,000 injuries and 1,500 deaths. Other surveys suggest 55% of people polled have driven while drowsy in the past year, 23% had fallen asleep but not crashed, 3% crashed, and 2% had and accident due to drowsy driving. Who is at risk? Young Drivers: One study of drowsy driving accidents states that 55% of the drivers were under 25 years. Of those, 75% were male. Shift Workers and Travelers: People who work overnight or travel across time zones frequently are at higher risk of experiencing Circadian Rhythm Disorders. They are trying to work and function when their body is programed to sleep. Sleep Deprived: Lack of sleep has a serious impact on your ability to pay attention or focus on a task. Consistently getting less than the average of 8 hours your body needs creates partial or cumulative sleep deprivation. Untreated Sleep Disorders: Sleep Apnea, Narcolepsy, R.L.S., and other sleep disorders (untreated) prevent a person from getting enough restful sleep. This leads to excessive daytime sleepiness and increases the risk for drowsy driving accidents by up to 7 times. Medications / Alcohol: Even over the counter medications can cause drowsiness. Medications that impair a drivers attention should have a warning label. Alcohol naturally makes you sleepy and on its own can cause accidents. Combined with excessive drowsiness its effects are amplified. Signs of Drowsy Driving: * You don't remember driving the last few miles * You may drift out of your maribel * You are unable to focus and your thoughts wander * You may yawn more often than normal 
 * You have difficulty keeping your eyes open / nodding off * Missing traffic signs, speeding, or tailgating Prevention-  
 Good sleep hygiene, lifestyle and behavioral choices have the most impact on drowsy driving. There is no substitute for sleep and the average person requires 8 hours nightly. If you find yourself driving drowsy, stop and sleep. Consider the sleep hygiene tips provided during your visit as well. Medication Refill Policy: Refills for all medications require 1 week advance notice. Please have your pharmacy fax a refill request. We are unable to fax, or call in \"controled substance\" medications and you will need to pick these prescriptions up from our office. Wish Dayshart Activation Thank you for requesting access to [employer]. Please follow the instructions below to securely access and download your online medical record. [employer] allows you to send messages to your doctor, view your test results, renew your prescriptions, schedule appointments, and more. How Do I Sign Up? 1. In your internet browser, go to [email or web address]. 2. Click on the First Time User? Click Here link in the Sign In box. You will see the New Member Sign Up page. 3. Enter your [employer] Access Code exactly as it appears below. You will not need to use this code after youve completed the sign-up process. If you do not sign up before the expiration date, you must request a new code. [employer] Access Code: Activation code not generated Current [employer] Status: Active (This is the date your [employer] access code will ) 4. Enter the last four digits of your Social Security Number (xxxx) and Date of Birth (mm/dd/yyyy) as indicated and click Submit. You will be taken to the next sign-up page. 5. Create a Via optronicst ID. This will be your [employer] login ID and cannot be changed, so think of one that is secure and easy to remember. 6. Create a [employer] password. You can change your password at any time. 7. Enter your Password Reset Question and Answer. This can be used at a later time if you forget your password. 8. Enter your e-mail address. You will receive e-mail notification when new information is available in [address] 9. Click Sign Up. You can now view and download portions of your medical record. 10. Click the Download Summary menu link to download a portable copy of your medical information. Additional Information If you have questions, please call [phone]. Remember, Gener[employer]Medt is NOT to be used for urgent needs. For medical emergencies, dial 911. Introducing [facility] [place] & [facility]! Dear Areli Galaviz: Thank you for requesting a [employer]ier account. Our records indicate that you already have an active [employer] account. You can access your account anytime at [email or web address] Did you know that you can access your hospital and ER discharge instructions at any time in [employer]ier? You can also review all of your test results from your hospital stay or ER visit. Additional Information If you have questions, please visit the Frequently Asked Questions section of the [employer] website at [email or web address] Remember, GroovinAdshart is NOT to be used for urgent needs. For medical emergencies, dial 911. Now available from your iPhone and Android! Please provide this summary of care documentation to your next provider. Your primary care clinician is listed as Laz Hernandez. If you have any questions after today's visit, please call [phone].

## 2018-03-14 ENCOUNTER — DOCUMENTATION ONLY (OUTPATIENT)
Dept: SLEEP MEDICINE | Age: 68
End: 2018-03-14

## 2018-03-15 ENCOUNTER — HOSPITAL ENCOUNTER (OUTPATIENT)
Dept: SLEEP MEDICINE | Age: 68
Discharge: HOME | End: 2018-03-15
Payer: MEDICARE

## 2018-03-15 VITALS
SYSTOLIC BLOOD PRESSURE: 145 MMHG | TEMPERATURE: 98.7 F | WEIGHT: 113.6 LBS | HEART RATE: 75 BPM | HEIGHT: 57 IN | DIASTOLIC BLOOD PRESSURE: 88 MMHG | OXYGEN SATURATION: 98 % | BODY MASS INDEX: 24.51 KG/M2

## 2018-03-15 DIAGNOSIS — G47.33 OBSTRUCTIVE SLEEP APNEA SYNDROME: ICD-10-CM

## 2018-03-15 PROCEDURE — 95810 POLYSOM 6/> YRS 4/> PARAM: CPT | Performed by: INTERNAL MEDICINE

## 2018-03-19 ENCOUNTER — TELEPHONE (OUTPATIENT)
Dept: SLEEP MEDICINE | Age: 68
End: 2018-03-19

## 2018-03-19 DIAGNOSIS — G47.33 OBSTRUCTIVE SLEEP APNEA SYNDROME: Primary | ICD-10-CM

## 2018-03-23 ENCOUNTER — DOCUMENTATION ONLY (OUTPATIENT)
Dept: SLEEP MEDICINE | Age: 68
End: 2018-03-23

## 2018-03-23 NOTE — PROGRESS NOTES
This note is being routed to Dr. Hesham Liz. Sleep Medicine consult note and sleep study report in patient's chart for review.     Thank you for the referral.

## 2018-04-24 ENCOUNTER — DOCUMENTATION ONLY (OUTPATIENT)
Dept: SLEEP MEDICINE | Age: 68
End: 2018-04-24

## 2018-04-25 ENCOUNTER — HOSPITAL ENCOUNTER (OUTPATIENT)
Dept: SLEEP MEDICINE | Age: 68
Discharge: HOME | End: 2018-04-25
Payer: MEDICARE

## 2018-04-25 DIAGNOSIS — G47.33 OBSTRUCTIVE SLEEP APNEA SYNDROME: ICD-10-CM

## 2018-04-25 PROCEDURE — 95811 POLYSOM 6/>YRS CPAP 4/> PARM: CPT | Performed by: INTERNAL MEDICINE

## 2018-04-26 ENCOUNTER — TELEPHONE (OUTPATIENT)
Dept: SLEEP MEDICINE | Age: 68
End: 2018-04-26

## 2018-04-26 DIAGNOSIS — I50.9 CONGESTIVE HEART FAILURE, UNSPECIFIED HF CHRONICITY, UNSPECIFIED HEART FAILURE TYPE (HCC): Primary | ICD-10-CM

## 2018-04-26 NOTE — TELEPHONE ENCOUNTER
I called to review results of the sleep study. I left a message to return my call to review results. (called both numbers)    I want to order echo of heart to assess heart, then will order PAP/ASV  Order attached.    Awaiting call back

## 2018-04-27 ENCOUNTER — DOCUMENTATION ONLY (OUTPATIENT)
Dept: SLEEP MEDICINE | Age: 68
End: 2018-04-27

## 2018-04-27 NOTE — TELEPHONE ENCOUNTER
Patient called and identity confirmed with 2 patient identifers  Pap titration revealed complex sleep apnea with prolonged central apneas. Treatment options for sleep apnea were reviewed. She will proceed with the cardiac echo (order in chart) and when results available and EF shown to be >45%, I will order a bipap ASV.    All of her questions addressed    Staff to call patient and schedule cardiac echo (order in chart)

## 2018-04-27 NOTE — TELEPHONE ENCOUNTER
Patient returned call, please call her at [phone]  A text was also sent to Dr. Kumar Tigerton notifying her that call was returned.

## 2018-05-02 ENCOUNTER — HOSPITAL ENCOUNTER (OUTPATIENT)
Dept: NON INVASIVE DIAGNOSTICS | Age: 68
Discharge: HOME | End: 2018-05-02
Attending: INTERNAL MEDICINE
Payer: MEDICARE

## 2018-05-02 DIAGNOSIS — I50.9 CONGESTIVE HEART FAILURE, UNSPECIFIED HF CHRONICITY, UNSPECIFIED HEART FAILURE TYPE (HCC): ICD-10-CM

## 2018-05-02 PROCEDURE — 93306 TTE W/DOPPLER COMPLETE: CPT

## 2018-05-22 ENCOUNTER — TELEPHONE (OUTPATIENT)
Dept: SLEEP MEDICINE | Age: 68
End: 2018-05-22

## 2018-05-22 DIAGNOSIS — G47.31 COMPLEX SLEEP APNEA SYNDROME: Primary | ICD-10-CM

## 2018-05-23 ENCOUNTER — OFFICE VISIT (OUTPATIENT)
Dept: OBGYN CLINIC | Age: 68
End: 2018-05-23

## 2018-05-23 VITALS
BODY MASS INDEX: 24.05 KG/M2 | SYSTOLIC BLOOD PRESSURE: 125 MMHG | WEIGHT: 114.6 LBS | HEIGHT: 58 IN | DIASTOLIC BLOOD PRESSURE: 85 MMHG

## 2018-05-23 DIAGNOSIS — Z01.419 ENCOUNTER FOR GYNECOLOGICAL EXAMINATION WITHOUT ABNORMAL FINDING: Primary | ICD-10-CM

## 2018-05-23 DIAGNOSIS — Z12.39 BREAST CANCER SCREENING OTHER THAN MAMMOGRAM: ICD-10-CM

## 2018-05-23 NOTE — PATIENT INSTRUCTIONS
Breast Self-Exam: Care Instructions  Your Care Instructions    A breast self-exam is when you check your breasts for lumps or changes. This regular exam helps you learn how your breasts normally look and feel. Most breast problems or changes are not because of cancer. Breast self-exam is not a substitute for a mammogram. Having regular breast exams by your doctor and regular mammograms improve your chances of finding any problems with your breasts. Some women set a time each month to do a step-by-step breast self-exam. Other women like a less formal system. They might look at their breasts as they brush their teeth, or feel their breasts once in a while in the shower. If you notice a change in your breast, tell your doctor. Follow-up care is a key part of your treatment and safety. Be sure to make and go to all appointments, and call your doctor if you are having problems. It's also a good idea to know your test results and keep a list of the medicines you take. How do you do a breast self-exam?  · The best time to examine your breasts is usually one week after your menstrual period begins. Your breasts should not be tender then. If you do not have periods, you might do your exam on a day of the month that is easy to remember. · To examine your breasts:  ¨ Remove all your clothes above the waist and lie down. When you are lying down, your breast tissue spreads evenly over your chest wall, which makes it easier to feel all your breast tissue. ¨ Use the pads-not the fingertips-of the 3 middle fingers of your left hand to check your right breast. Move your fingers slowly in small coin-sized circles that overlap. ¨ Use three levels of pressure to feel of all your breast tissue. Use light pressure to feel the tissue close to the skin surface. Use medium pressure to feel a little deeper. Use firm pressure to feel your tissue close to your breastbone and ribs.  Use each pressure level to feel your breast tissue before moving on to the next spot. ¨ Check your entire breast, moving up and down as if following a strip from the collarbone to the bra line, and from the armpit to the ribs. Repeat until you have covered the entire breast.  ¨ Repeat this procedure for your left breast, using the pads of the 3 middle fingers of your right hand. · To examine your breasts while in the shower:  ¨ Place one arm over your head and lightly soap your breast on that side. ¨ Using the pads of your fingers, gently move your hand over your breast (in the strip pattern described above), feeling carefully for any lumps or changes. ¨ Repeat for the other breast.  · Have your doctor inspect anything you notice to see if you need further testing. Where can you learn more? Go to http://kristin-rajni.info/. Enter P148 in the search box to learn more about \"Breast Self-Exam: Care Instructions. \"  Current as of: May 12, 2017  Content Version: 11.4  © 8925-4853 Healthwise, Incorporated. Care instructions adapted under license by [employer] (which disclaims liability or warranty for this information). If you have questions about a medical condition or this instruction, always ask your healthcare professional. [place] [address] any warranty or liability for your use of this information.

## 2018-05-23 NOTE — TELEPHONE ENCOUNTER
Patient called and identity confirmed with 2 patient identifers    Echo reviewed EF 55-65%  No contraindication to ASV,  Will order asv and see her in 5 weeks after set up

## 2018-05-23 NOTE — MR AVS SNAPSHOT
[address] [facility] [address] [facility] 
[phone] Patient: Mireya Mojica MRN: [id number] SOX:9[birth year] Visit Information Date & Time Provider Department Dept. Phone Encounter #  
 5/23/2018 10:00 AM Arcenio Wilson [phone] [id number] Your Appointments 8/28/2018 11:00 AM  
ROUTINE CARE with Ericka Ku MD  
[facility] Appt Note: R F/U  
 [place] [address] [facility] [address]  
[address] [facility] [id number] Upcoming Health Maintenance Date Due Influenza Age 5 to Adult 8/1/2018 BREAST CANCER SCRN MAMMOGRAM 9/25/2019 COLONOSCOPY 5/30/2020 Allergies as of 5/23/2018  Review Complete On: 5/23/2018 By: Sunita Rae LPN Severity Noted Reaction Type Reactions Codeine  10/14/2015    Nausea and Vomiting Keflex [Cephalexin]  10/14/2015    Nausea and Vomiting Current Immunizations  Reviewed on 2/23/2017 Name Date Influenza Vaccine 10/1/2016 Influenza Vaccine (Quad) PF 10/14/2015 Pneumococcal Conjugate (PCV-13) 8/12/2016 Tdap 10/12/2016 Zoster Vaccine, Live 10/12/2010 Not reviewed this visit Vitals BP Height(growth percentile) Weight(growth percentile) BMI OB Status Smoking Status 125/85 (BP 1 Location: Left arm, BP Patient Position: Sitting) 4' 10\" (1.473 m) 114 lb 9.6 oz (52 kg) 23.95 kg/m2 Postmenopausal Never Smoker BMI and BSA Data Body Mass Index Body Surface Area  
 23.95 kg/m 2 1.46 m 2 Preferred Pharmacy Pharmacy Name Phone [address], [address] [place] [address] [phone] Your Updated Medication List  
  
   
This list is accurate as of 5/23/18 10:26 AM.  Always use your most recent med list.  
  
  
  
  
 clobetasol 0.05 % external solution Commonly known as:  TEMOVATE  
  
 latanoprost 0.005 % ophthalmic solution Commonly known as:  Betty Jernigan Administer 1 Drop to left eye nightly. lisinopril 20 mg tablet Commonly known as:  PRINIVIL, ZESTRIL  
TAKE ONE TABLET BY MOUTH DAILY RESTASIS 0.05 % ophthalmic emulsion Generic drug:  cycloSPORINE  
  
 simvastatin 10 mg tablet Commonly known as:  ZOCOR Take 1 Tab by mouth nightly. triamcinolone acetonide 0.1 % topical cream  
Commonly known as:  KENALOG Patient Instructions Breast Self-Exam: Care Instructions Your Care Instructions A breast self-exam is when you check your breasts for lumps or changes. This regular exam helps you learn how your breasts normally look and feel. Most breast problems or changes are not because of cancer. Breast self-exam is not a substitute for a mammogram. Having regular breast exams by your doctor and regular mammograms improve your chances of finding any problems with your breasts. Some women set a time each month to do a step-by-step breast self-exam. Other women like a less formal system. They might look at their breasts as they brush their teeth, or feel their breasts once in a while in the shower. If you notice a change in your breast, tell your doctor. Follow-up care is a key part of your treatment and safety. Be sure to make and go to all appointments, and call your doctor if you are having problems. It's also a good idea to know your test results and keep a list of the medicines you take. How do you do a breast self-exam? 
· The best time to examine your breasts is usually one week after your menstrual period begins. Your breasts should not be tender then. If you do not have periods, you might do your exam on a day of the month that is easy to remember. · To examine your breasts: ¨ Remove all your clothes above the waist and lie down.  When you are lying down, your breast tissue spreads evenly over your chest wall, which makes it easier to feel all your breast tissue. ¨ Use the pads-not the fingertips-of the 3 middle fingers of your left hand to check your right breast. Move your fingers slowly in small coin-sized circles that overlap. ¨ Use three levels of pressure to feel of all your breast tissue. Use light pressure to feel the tissue close to the skin surface. Use medium pressure to feel a little deeper. Use firm pressure to feel your tissue close to your breastbone and ribs. Use each pressure level to feel your breast tissue before moving on to the next spot. ¨ Check your entire breast, moving up and down as if following a strip from the collarbone to the bra line, and from the armpit to the ribs. Repeat until you have covered the entire breast. 
¨ Repeat this procedure for your left breast, using the pads of the 3 middle fingers of your right hand. · To examine your breasts while in the shower: 
¨ Place one arm over your head and lightly soap your breast on that side. ¨ Using the pads of your fingers, gently move your hand over your breast (in the strip pattern described above), feeling carefully for any lumps or changes. ¨ Repeat for the other breast. 
· Have your doctor inspect anything you notice to see if you need further testing. Where can you learn more? Go to http://kristin-rajni.info/. Enter P148 in the search box to learn more about \"Breast Self-Exam: Care Instructions. \" Current as of: May 12, 2017 Content Version: 11.4 © 3097-8515 Healthwise, Incorporated. Care instructions adapted under license by [employer] (which disclaims liability or warranty for this information). If you have questions about a medical condition or this instruction, always ask your healthcare professional. [place] [address] any warranty or liability for your use of this information. Introducing [facility]! Dear Zane Cheung: Thank you for requesting a Funsherpa account. Our records indicate that you already have an active Funsherpa account. You can access your account anytime at [email or web address] "[email or web address]"/Heart Health Did you know that you can access your hospital and ER discharge instructions at any time in Funsherpa? You can also review all of your test results from your hospital stay or ER visit. Additional Information If you have questions, please visit the Frequently Asked Questions section of the F[employer]herpa website at [email or web address] Remember, Funsherpa is NOT to be used for urgent needs. For medical emergencies, dial 911. Now available from your iPhone and Android! Please provide this summary of care documentation to your next provider. Your primary care clinician is listed as Lilli Rose. If you have any questions after today's visit, please call [phone].

## 2018-05-23 NOTE — PROGRESS NOTES
Annual exam ages 65+    Nathanael Parham is a ,  69 y.o. female [facility] No LMP recorded. Patient is postmenopausal.  She presents for her annual checkup. She is having no significant problems. She was recently dx with sleep apnea and mild regurgitation of multiple heart valves. In process of getting Bi-pap machine. With regard to the Gardasil vaccine, she is older than the age for which it is FDA approved. Hormonal status:  She reports no perimenstrual type symptoms. She is not having vasomotor symptoms. The patient is not using any ERT. Sexual history:    She  reports that she does not currently engage in sexual activity but has had male partners.; She reports using the following method of birth control/protection: Surgical.    Medical conditions:    Since her last annual GYN exam about 3/21/2017 ago, she has not the following changes in her health history: none. Surgical history confirmed with patient. has a past surgical history that includes hx tubal ligation and hx  section. Pap and Mammogram History:    Her most recent Pap smear was normal obtained 3/21/2017 year(s) ago. The patient goes to [facility] for her mammograms and is due 2018. Order placed today. .    Breast Cancer History/Substance Abuse: negative      Osteoporosis History:    Family history does not include a first or second degree relative with osteopenia or osteoporosis. A bone density scan was obtained 2016 and revealed osteoporsis. She is currently taking vit D and MVI that includes calcium. Was given Vit D RX in past, but didn't take consistently. Has tried Evista in past, stopped d/t leg cramps.     Past Medical History:   Diagnosis Date    Arthritis     Blepharospasm     Hx of mammogram 2017    Negative    Hyperlipidemia     Hypertension     Neuralgia     Osteoporosis 2016    DEXA    Routine Papanicolaou smear 2017    Negative (no hpv)     Sicca syndrome ([facility] [address]      Past Surgical History:   Procedure Laterality Date    HX  SECTION      x2    HX TUBAL LIGATION      90s; L/S       Current Outpatient Prescriptions   Medication Sig Dispense Refill    lisinopril (PRINIVIL, ZESTRIL) 20 mg tablet TAKE ONE TABLET BY MOUTH DAILY 90 Tab 2    clobetasol (TEMOVATE) 0.05 % external solution       triamcinolone acetonide (KENALOG) 0.1 % topical cream       simvastatin (ZOCOR) 10 mg tablet Take 1 Tab by mouth nightly. 90 Tab 2    latanoprost (XALATAN) 0.005 % ophthalmic solution Administer 1 Drop to left eye nightly.  RESTASIS 0.05 % ophthalmic emulsion   6     Allergies: Codeine and Keflex [cephalexin]     Tobacco History:  reports that she has never smoked. She has never used smokeless tobacco.  Alcohol Abuse:  reports that she drinks about 3.0 oz of alcohol per week   Drug Abuse:  reports that she does not use illicit drugs.     Family Medical/Cancer History:   Family History   Problem Relation Age of Onset   [facility] Alzheimer Mother       @ 81yo    Heart Disease Father     Diabetes Father     Alzheimer Father     Cancer Brother      Lung ( @ 62yo)   [facility] Cancer Sister      Hodgkins Disease ( at 44yo)   [facility] Other Sister      Non-Hodkins Disease     Breast Problems Maternal Aunt         Review of Systems - History obtained from the patient  Constitutional: negative for weight loss, fever, night sweats  HEENT: negative for hearing loss, earache, congestion, snoring, sorethroat  CV: negative for chest pain, palpitations, edema  Resp: negative for cough, shortness of breath, wheezing  GI: negative for change in bowel habits, abdominal pain, black or bloody stools  : negative for frequency, dysuria, hematuria, vaginal discharge  MSK: negative for back pain, joint pain, muscle pain  Breast: negative for breast lumps, nipple discharge, galactorrhea  Skin :negative for itching, rash, hives  Neuro: negative for dizziness, headache, confusion, weakness  Psych: negative for anxiety, depression, change in mood  Heme/lymph: negative for bleeding, bruising, pallor    Physical Exam    Visit Vitals    /85 (BP 1 Location: Left arm, BP Patient Position: Sitting)    Ht 4' 10\" (1.473 m)    Wt 114 lb 9.6 oz (52 kg)    BMI 23.95 kg/m2       Constitutional  · Appearance: well-nourished, well developed, alert, in no acute distress    HENT  · Head and Face: appears normal    Neck  · Inspection/Palpation: normal appearance, no masses or tenderness  · Lymph Nodes: no lymphadenopathy present  · Thyroid: gland size normal, nontender, no nodules or masses present on palpation    Chest  · Respiratory Effort: breathing unlabored  · Auscultation: normal breath sounds    Cardiovascular  · Heart:  · Auscultation: regular rate and rhythm without murmur    Breasts  · Inspection of Breasts: breasts symmetrical, no skin changes, no discharge present, nipple appearance normal, no skin retraction present  · Palpation of Breasts and Axillae: no masses present on palpation, no breast tenderness  · Axillary Lymph Nodes: no lymphadenopathy present    Gastrointestinal  · Abdominal Examination: abdomen non-tender to palpation, normal bowel sounds, no masses present  · Liver and spleen: no hepatomegaly present, spleen not palpable  · Hernias: no hernias identified    Genitourinary  · External Genitalia: normal appearance for age, no discharge present, no tenderness present, no inflammatory lesions present, no masses present, mod atrophy present  · Vagina: atrophic but otherwise normal vaginal vault without central or paravaginal defects, no discharge present, no inflammatory lesions present, no masses present; atrophic  · Bladder: non-tender to palpation  · Urethra: appears normal  · Cervix: nl   · Uterus: NSSC  · Adnexa: no adnexal tenderness present, no adnexal masses present  · Perineum: perineum within normal limits, no evidence of trauma, no rashes or skin lesions present  · Anus: anus within normal limits, no hemorrhoids present  · Inguinal Lymph Nodes: no lymphadenopathy present  RV:  No masses    Skin  · General Inspection: no rash, no lesions identified    Neurologic/Psychiatric  · Mental Status:  · Orientation: grossly oriented to person, place and time  · Mood and Affect: mood normal, affect appropriate    Assessment:  Routine gynecologic examination  Her current medical status is satisfactory with no evidence of significant gynecologic issues. Osteoporosis. Now taking Vitamin D and MVI with calcium. Has appt scheduled with PCP, Dr. Chana Richards, in August.    Pap neg 3/2017    Plan:  Counseled re: diet, exercise, healthy lifestyle  Return for yearly wellness visits  Rec annual mammogram.  Due in Sept.  Adv to discuss Ca, Vit D, osteoporosis with Dr. Chana Richards   Pap next year. Plan pap q 2yrs x3, then can exit routine screening if all neg.

## 2018-05-24 ENCOUNTER — DOCUMENTATION ONLY (OUTPATIENT)
Dept: SLEEP MEDICINE | Age: 68
End: 2018-05-24

## 2018-05-24 NOTE — PROGRESS NOTES
Faxed CPAP order to [facility] A voice mail message was left on 5/24/18 for patient to schedule PAP adherence appointment.  Also to inform of dme contact information

## 2018-06-25 ENCOUNTER — TELEPHONE (OUTPATIENT)
Dept: SLEEP MEDICINE | Age: 68
End: 2018-06-25

## 2018-06-25 NOTE — TELEPHONE ENCOUNTER
Patient reports she received her CPAP device a few days ago and has not been comfortable with it since, she states her first night she felt as if she received too much air and the next night she felt she received not enough air. She felt as if she is suffocating. She also complained of the mask not fitting properly, she will go back to [facility] to see if she can get another mask but she would like to know what can she do about not be able to tolerate the CPAP device. She also would like to know how many hours she actually slept for her last sleep study.  Please advise

## 2018-06-27 NOTE — TELEPHONE ENCOUNTER
Download unavailable at this time as ASV was recently setup. Spoke w/ patient who states she was able to find a better mask during her appointment with [facility] today. She will continue using this new mask and contact the sleep center if she experiences any further complications with her PAP therapy.

## 2018-06-27 NOTE — TELEPHONE ENCOUNTER
Patient called today wanting to speak with a technician before her follow up appointment with [facility] in an hour. She has questions about her pressure settings and machine. States [facility] wasn't very informative.

## 2018-06-28 NOTE — TELEPHONE ENCOUNTER
Spoke w/ patient regarding previous nights usage and addressed mask leak concerns. She reports of improved comfort with new mask. Patient encouraged to continue regular nightly use.

## 2018-07-16 ENCOUNTER — TELEPHONE (OUTPATIENT)
Dept: SLEEP MEDICINE | Age: 68
End: 2018-07-16

## 2018-07-16 NOTE — TELEPHONE ENCOUNTER
Spoke w/ patient concerning complications with mask and pressure. She would like to trial a nasal mask again considering the discomfort and leak she experienced with full face masks. She also reports of frequent awakenings when the airflow approaches 13cm. Download revealed elevated AHI of 18.4. Leak is within normal ranges. She is 95% compliant. We will send order for nasal mask to improve comfort. Dr. Jose Mata will be consulted to address elevated AHI.

## 2018-07-16 NOTE — TELEPHONE ENCOUNTER
Patient reports mask issues and concerns and wants to make sure she is using her CPAP correctly.  She requested to speak with Irene Brar and would [facility] a call back on her cell phone at [phone]

## 2018-07-19 ENCOUNTER — TELEPHONE (OUTPATIENT)
Dept: SLEEP MEDICINE | Age: 68
End: 2018-07-19

## 2018-07-19 ENCOUNTER — DOCUMENTATION ONLY (OUTPATIENT)
Dept: SLEEP MEDICINE | Age: 68
End: 2018-07-19

## 2018-07-19 DIAGNOSIS — G47.31 COMPLEX SLEEP APNEA SYNDROME: Primary | ICD-10-CM

## 2018-07-19 NOTE — TELEPHONE ENCOUNTER
Tech to call patient   Download reviewed. Looks like she may benefit with higher settings but unsure if she will tolerate at this time as she is waking up at 13 cm. Order attached for nasal mask and chin strap  Trial of setting change to EPAP 5-9, PS 0-6  Max pressure 15 cm. If uncomfortable, return to previous setting.  Inform patient of plan

## 2018-07-20 ENCOUNTER — TELEPHONE (OUTPATIENT)
Dept: SLEEP MEDICINE | Age: 68
End: 2018-07-20

## 2018-07-20 NOTE — TELEPHONE ENCOUNTER
Patient called to speak with Avani. She wanted to talk about mask issues that she is having. She said her Dentist also suggested she try mouth-guard. I told her that she needed to discuss alternative treatment plans with .  Her appointment is in August 28. She said she cant wait that long and wanted Avani to call her back on Monday.

## 2018-07-23 NOTE — TELEPHONE ENCOUNTER
Spoke w/ patient. She remains uncomfortable with current mask on ASV. She has stopped using the device as of 7/20/18. Patient was advised to restart usage if possible and that a mask order w/ chin strap has been placed. Dr. Marlo Cannon will made aware of patients request to discuss alternative therapy options. Humidification operations were also reviewed. Patient advised to contact the sleep center with any further questions regarding her PAP therapy.

## 2018-07-30 ENCOUNTER — TELEPHONE (OUTPATIENT)
Dept: SLEEP MEDICINE | Age: 68
End: 2018-07-30

## 2018-07-30 NOTE — TELEPHONE ENCOUNTER
Patient left message reporting she is unable to tolerate the CPAP device and the mask and would like to discuss other options of therapy. Please advise as to what you would like to do.

## 2018-08-01 ENCOUNTER — TELEPHONE (OUTPATIENT)
Dept: SLEEP MEDICINE | Age: 68
End: 2018-08-01

## 2018-08-01 NOTE — TELEPHONE ENCOUNTER
Re:  PAP intolerance - requesting to speak with provider. Unable to tolerate therapy and is suffering from insomnia now as she is unable to sleep. Feels sleep was better prior to therapy. Patient is quite frustrated. Agreed to schedule a sooner return appointment but requests to speak to Dr. Chastity Spangler.

## 2018-08-06 NOTE — TELEPHONE ENCOUNTER
I called to address any questions/concerns. I left a message to return my call and that I will forward this message to my lead technologist in case she calls in my absence.  Staff was to set her up with a clinic or follow-up appointment as she is having intolerance to pap

## 2018-08-14 ENCOUNTER — DOCUMENTATION ONLY (OUTPATIENT)
Dept: SLEEP MEDICINE | Age: 68
End: 2018-08-14

## 2018-08-14 ENCOUNTER — OFFICE VISIT (OUTPATIENT)
Dept: SLEEP MEDICINE | Age: 68
End: 2018-08-14

## 2018-08-14 VITALS
DIASTOLIC BLOOD PRESSURE: 76 MMHG | BODY MASS INDEX: 24.1 KG/M2 | WEIGHT: 114.8 LBS | SYSTOLIC BLOOD PRESSURE: 149 MMHG | OXYGEN SATURATION: 99 % | HEART RATE: 54 BPM | HEIGHT: 58 IN

## 2018-08-14 DIAGNOSIS — G47.31 COMPLEX SLEEP APNEA SYNDROME: ICD-10-CM

## 2018-08-14 DIAGNOSIS — G47.00 INSOMNIA, UNSPECIFIED TYPE: ICD-10-CM

## 2018-08-14 DIAGNOSIS — G47.33 OBSTRUCTIVE SLEEP APNEA (ADULT) (PEDIATRIC): Primary | ICD-10-CM

## 2018-08-14 RX ORDER — DORZOLAMIDE HYDROCHLORIDE AND TIMOLOL MALEATE 20; 5 MG/ML; MG/ML
SOLUTION/ DROPS OPHTHALMIC
COMMUNITY
Start: 2018-08-07 | End: 2019-01-08 | Stop reason: ALTCHOICE

## 2018-08-14 NOTE — PATIENT INSTRUCTIONS
[address] [facility] [place], [address]  Tel.  [phone]  Fax. 100 [facility] 60  [place], [address] [facility]  Tel.  [phone]  Fax. [phone] [address]  Tel.  [phone]  Fax. [phone]       Insomnia: After Your Visit  Your Care Instructions  Insomnia is the inability to sleep well. It is a common problem for most people at some time. Insomnia may make it hard for you to get to sleep, stay asleep, or sleep as long as you need to. This can make you tired and grouchy during the day. It can also make you forgetful, less effective at work, and unhappy. Insomnia can be caused by conditions such as depression or anxiety. Pain can also affect your ability to sleep. When these problems are solved, the insomnia usually clears up. But sometimes bad sleep habits can cause insomnia. If insomnia is affecting your work or your enjoyment of life, you can take steps to improve your sleep. Follow-up care is a key part of your treatment and safety. Be sure to make and go to all appointments, and call your doctor if you are having problems. It's also a good idea to know your test results and keep a list of the medicines you take. How can you care for yourself at home? What to avoid  · Do not have drinks with caffeine, such as coffee or black tea, for 8 hours before bed. · Do not smoke or use other types of tobacco near bedtime. Nicotine is a stimulant and can keep you awake. · Avoid drinking alcohol late in the evening, because it can cause you to wake in the middle of the night. · Do not eat a big meal close to bedtime. If you are hungry, eat a light snack. · Do not drink a lot of water close to bedtime, because the need to urinate may wake you up during the night. · Do not read or watch TV in bed. Use the bed only for sleeping and sexual activity.   What to try  · Go to bed at the same time every night, and wake up at the same time every morning. Do not take naps during the day. · Keep your bedroom quiet, dark, and cool. · Get regular exercise, but not within 3 to 4 hours of your bedtime. .  · Sleep on a comfortable pillow and mattress. · If watching the clock makes you anxious, turn it facing away from you so you cannot see the time. · If you worry when you lie down, start a worry book. Well before bedtime, write down your worries, and then set the book and your concerns aside. · Try meditation or other relaxation techniques before you go to bed. · If you cannot fall asleep, get up and go to another room until you feel sleepy. Do something relaxing. Repeat your bedtime routine before you go to bed again. · Make your house quiet and calm about an hour before bedtime. Turn down the lights, turn off the TV, log off the computer, and turn down the volume on music. This can help you relax after a busy day. When should you call for help? Watch closely for changes in your health, and be sure to contact your doctor if:  · Your efforts to improve your sleep do not work. · Your insomnia gets worse. · You fall asleep during the day. Where can you learn more? Go to Spherix.be  Enter P513 in the search box to learn more about \"Insomnia: After Your Visit. \"   © 7736-4146 Healthwise, Incorporated. Care instructions adapted under license by [facility] (which disclaims liability or warranty for this information). This care instruction is for use with your licensed healthcare professional. If you have questions about a medical condition or this instruction, always ask your healthcare professional. [place] [address] any warranty or liability for your use of this information. Content Version: 6.7.87217; Last Revised: June 26, 2010    Drowsy Driving:  The [employer] cites drowsiness as a causing factor in more than 350,777 police reported crashes annually, resulting in 76,000 injuries and 1,500 deaths. Other surveys suggest 55% of people polled have driven while drowsy in the past year, 23% had fallen asleep but not crashed, 3% crashed, and 2% had and accident due to drowsy driving. Who is at risk? Young Drivers: One study of drowsy driving accidents states that 55% of the drivers were under 25 years. Of those, 75% were male. Shift Workers and Travelers: People who work overnight or travel across time zones frequently are at higher risk of experiencing Circadian Rhythm Disorders. They are trying to work and function when their body is programed to sleep. Sleep Deprived: Lack of sleep has a serious impact on your ability to pay attention or focus on a task. Consistently getting less than the average of 8 hours your body needs creates partial or cumulative sleep deprivation. Untreated Sleep Disorders: Sleep Apnea, Narcolepsy, R.L.S., and other sleep disorders (untreated) prevent a person from getting enough restful sleep. This leads to excessive daytime sleepiness and increases the risk for drowsy driving accidents by up to 7 times. Medications / Alcohol: Even over the counter medications can cause drowsiness. Medications that impair a drivers attention should have a warning label. Alcohol naturally makes you sleepy and on its own can cause accidents. Combined with excessive drowsiness its effects are amplified. Signs of Drowsy Driving:   * You don't remember driving the last few miles   * You may drift out of your maribel   * You are unable to focus and your thoughts wander   * You may yawn more often than normal   * You have difficulty keeping your eyes open / nodding off   * Missing traffic signs, speeding, or tailgating  Prevention-   Good sleep hygiene, lifestyle and behavioral choices have the most impact on drowsy driving. There is no substitute for sleep and the average person requires 8 hours nightly.  If you find yourself driving drowsy, stop and sleep. Consider the sleep hygiene tips provided during your visit as well. Medication Refill Policy: Refills for all medications require 1 week advance notice. Please have your pharmacy fax a refill request. We are unable to fax, or call in \"controled substance\" medications and you will need to pick these prescriptions up from our office. [employer] Activation    Thank you for requesting access to Who@. Please follow the instructions below to securely access and download your online medical record. Who@ allows you to send messages to your doctor, view your test results, renew your prescriptions, schedule appointments, and more. How Do I Sign Up? 1. In your internet browser, go to [email or web address]. 2. Click on the First Time User? Click Here link in the Sign In box. You will see the New Member Sign Up page. 3. Enter your Who@ Access Code exactly as it appears below. You will not need to use this code after youve completed the sign-up process. If you do not sign up before the expiration date, you must request a new code. Who@ Access Code: Activation code not generated  Current Who@ Status: Active (This is the date your Who@ access code will )    4. Enter the last four digits of your Social Security Number (xxxx) and Date of Birth (mm/dd/yyyy) as indicated and click Submit. You will be taken to the next sign-up page. 5. Create a Who@ ID. This will be your Who@ login ID and cannot be changed, so think of one that is secure and easy to remember. 6. Create a Who@ password. You can change your password at any time. 7. Enter your Password Reset Question and Answer. This can be used at a later time if you forget your password. 8. Enter your e-mail address. You will receive e-mail notification when new information is available in [address] 9. Click Sign Up. You can now view and download portions of your medical record.   10. Click the Download Summary menu link to download a portable copy of your medical information. Additional Information    If you have questions, please call [phone]. Remember, [employer] is NOT to be used for urgent needs. For medical emergencies, dial 911.

## 2018-08-14 NOTE — PROGRESS NOTES
[address] [facility] [address] [place], [address]  Tel.  [phone]  Fax. 100 [facility] 60  [address], [address]  Tel.  [phone]  Fax. [phone] [address] [facility] [address]  Tel.  [phone]  Fax. [phone]     S>Kimberlee Estrada is a 77 y.o. female seen for a positive airway pressure follow-up. She reports maximal problems using the device. The following problems are identified:    Drowsiness no Problems exhaling no   Snoring no Forget to put on no   Mask Comfortable no Can't fall asleep yes   Dry Mouth no Mask falls off yes   Air Leaking yes Frequent awakenings yes         She admits that her sleep has significantly worsened. Download reviewed. AHi elevated. She says she fights the machine all night long. She has been sleeping flat on her back. She has developed trouble sleeping and now dreads going to sleep.she does not wish to continue her CPAP    Allergies   Allergen Reactions    Codeine Nausea and Vomiting    Keflex [Cephalexin] Nausea and Vomiting       She has a current medication list which includes the following prescription(s): lisinopril, latanoprost, dorzolamide-timolol, clobetasol, triamcinolone acetonide, simvastatin, and restasis. .      She  has a past medical history of Arthritis; Blepharospasm; mammogram (09/25/2017); Hyperlipidemia; Hypertension; Neuralgia; Osteoporosis (09/21/2016); Routine Papanicolaou smear (03/21/2017); and Sicca syndrome ([facility] [address] [place] Sleepiness Score: 3   and Modified F.O.S.Q. Score Total / 2: 18.5   which reflect improved sleep quality over therapy time.     O>    Visit Vitals    /76    Pulse (!) 54    Ht 4' 10\" (1.473 m)    Wt 114 lb 12.8 oz (52.1 kg)    SpO2 99%    BMI 23.99 kg/m2           General:   Alert, oriented, not in distress   Neck:   No JVD    Chest/Lungs:  symetrical lung expansion , no accessory muscle use    Extremities:  no obvious rashes , negative edema    Neuro:  No focal deficits ; No obvious tremor    Psych:  Normal affect ,  Normal countenance ;           A>    ICD-10-CM ICD-9-CM    1. Obstructive sleep apnea (adult) (pediatric) G47.33 327.23 REFERRAL TO DENTISTRY      AMB SUPPLY ORDER   2. Complex sleep apnea syndrome G47.31 327.21 AMB SUPPLY ORDER   3. Insomnia, unspecified type G47.00 780.52      AHI = 17. On ASV :  4-8 cmH2O EP AP, PS 0-5, auto rate    Compliant:      yes    Therapeutic Response:  Negative    P>      * Follow-up Disposition:  Return in about 6 months (around 2/14/2019). She is not tolerating PAP and it is giving her anxiety. She will discontinue PAP. * Treatment options were offered. She has elected to proceed with a trial of using an Oral Device (Mandibular Advancing Device, Tongue Retention Device, etc.) which can be effective treatment for sleep apnea. * We have referred the patient to Dentistry (she will obtain contact information and provide to our staff)for appliance fitting and adjustments. * Repeat polysomnogram is indicated 3 month following oral device initiation to gauge treatment success. * Counseling was provided regarding proper sleep hygiene and safe driving. She will sleep elevated or on her side  2. Insomnia - I have advised a regular sleep wake cycle. she  was advised to avoid looking at the clock during the night. Ideally, the clock face should be turned away. she was advised to minimize caffeine use and to avoid caffeine-containing beverages 8 hours prior to bedtime. Naps were discouraged. A regular exercise schedule, at least 3 hours before bedtime, would be beneficial to improving sleep quality. she was advised to avoid evening light and to use sunglasses in the late afternoon. Watching TV, using laptops, tablets and smartphones in the evening was discouraged. she  was advised to keep the bedroom cool and dark. Relaxation strategies reviewed in detail All of her questions were addressed.     Office visit exceeded 25 minutes with counseling and direction of care taking up more than 50% of the allotted time.       Electronically signed by    Jackie Alvarez MD  Diplomate in Sleep Medicine  [facility]

## 2018-08-29 ENCOUNTER — OFFICE VISIT (OUTPATIENT)
Dept: INTERNAL MEDICINE CLINIC | Age: 68
End: 2018-08-29

## 2018-08-29 ENCOUNTER — HOSPITAL ENCOUNTER (OUTPATIENT)
Dept: LAB | Age: 68
Discharge: HOME | End: 2018-08-29
Payer: MEDICARE

## 2018-08-29 VITALS
SYSTOLIC BLOOD PRESSURE: 160 MMHG | HEIGHT: 58 IN | DIASTOLIC BLOOD PRESSURE: 82 MMHG | RESPIRATION RATE: 12 BRPM | OXYGEN SATURATION: 97 % | WEIGHT: 115.6 LBS | TEMPERATURE: 98.7 F | HEART RATE: 57 BPM | BODY MASS INDEX: 24.27 KG/M2

## 2018-08-29 DIAGNOSIS — G47.31 CENTRAL SLEEP APNEA SYNDROME: ICD-10-CM

## 2018-08-29 DIAGNOSIS — E78.5 DYSLIPIDEMIA, GOAL LDL BELOW 100: Primary | ICD-10-CM

## 2018-08-29 DIAGNOSIS — R41.3 MEMORY LOSS: ICD-10-CM

## 2018-08-29 DIAGNOSIS — I10 HTN (HYPERTENSION), BENIGN: ICD-10-CM

## 2018-08-29 DIAGNOSIS — H40.052 OCULAR HYPERTENSION OF LEFT EYE: ICD-10-CM

## 2018-08-29 PROCEDURE — 36415 COLL VENOUS BLD VENIPUNCTURE: CPT

## 2018-08-29 PROCEDURE — 80053 COMPREHEN METABOLIC PANEL: CPT

## 2018-08-29 PROCEDURE — 80061 LIPID PANEL: CPT

## 2018-08-29 NOTE — PROGRESS NOTES
1. Have you been to the ER, urgent care clinic since your last visit? Hospitalized since your last visit? No 
 
2. Have you seen or consulted any other health care providers outside of the [address] [place] [address] since your last visit? Include any pap smears or colon screening. No  
 
Chief Complaint Patient presents with  Hypertension 78 y/o female reports to office for follow up. Pt reports d/c zocor.

## 2018-08-30 LAB
ALBUMIN SERPL-MCNC: 4.6 G/DL (ref 3.5–4.8)
ALBUMIN/GLOB SERPL: 2.1 {RATIO} (ref 1.2–2.2)
ALP SERPL-CCNC: 72 IU/L (ref 39–117)
ALT SERPL-CCNC: 20 IU/L (ref 0–32)
AST SERPL-CCNC: 23 IU/L (ref 0–40)
BILIRUB SERPL-MCNC: 0.6 MG/DL (ref 0–1.2)
BUN SERPL-MCNC: 11 MG/DL (ref 8–27)
BUN/CREAT SERPL: 17 (ref 12–28)
CALCIUM SERPL-MCNC: 9.6 MG/DL (ref 8.7–10.3)
CHLORIDE SERPL-SCNC: 103 MMOL/L (ref 96–106)
CHOLEST SERPL-MCNC: 266 MG/DL (ref 100–199)
CO2 SERPL-SCNC: 26 MMOL/L (ref 20–29)
CREAT SERPL-MCNC: 0.66 MG/DL (ref 0.57–1)
GLOBULIN SER CALC-MCNC: 2.2 G/DL (ref 1.5–4.5)
GLUCOSE SERPL-MCNC: 91 MG/DL (ref 65–99)
HDLC SERPL-MCNC: 85 MG/DL
INTERPRETATION, 910389: NORMAL
LDLC SERPL CALC-MCNC: 160 MG/DL (ref 0–99)
POTASSIUM SERPL-SCNC: 4.1 MMOL/L (ref 3.5–5.2)
PROT SERPL-MCNC: 6.8 G/DL (ref 6–8.5)
SODIUM SERPL-SCNC: 143 MMOL/L (ref 134–144)
TRIGL SERPL-MCNC: 103 MG/DL (ref 0–149)
VLDLC SERPL CALC-MCNC: 21 MG/DL (ref 5–40)

## 2018-08-30 RX ORDER — ATORVASTATIN CALCIUM 10 MG/1
10 TABLET, FILM COATED ORAL DAILY
Qty: 90 TAB | Refills: 3 | Status: SHIPPED | OUTPATIENT
Start: 2018-08-30 | End: 2019-08-15 | Stop reason: SDUPTHER

## 2018-08-30 NOTE — PROGRESS NOTES
Chief Complaint Patient presents with  Hypertension 76 y/o female reports to office for follow up.  Results Pt states would like to discuss sleep study. Subjective:  She has hypertension and hyperlipidemia. Because of sleep issues and symptoms she stopped the statin 4-6 months ago. I referred her for a sleep study. She was found to have a combination of PADMINI and CSA. She tried three different masks on BIPAP, unfortunately she couldn't tolerate any of them very well. She's now being evaluated by her dentist for a sleep apnea mouth guard, which will help with the PADMINI, but won't have anything valuable for the CSA. She thinks she probably should go back on statin because none of her symptoms really improved and she understands that her symptoms are now related to sleep apnea. BP control has been good at home, 120s-130/80. In offices and doctor offices it's always high and was high here today. ROS:  Negative for cardiovascular symptoms. She previously took Zocor. Physical Examination:  Her BP is as noted. She had a regular rhythm clear lungs. Plan:  I recommended repeating labs. If lipids are still abnormal will restart her on a statin, probably use Lipitor instead of Zocor. She'll continue to follow up with the sleep lab and her dentist in reference to sleep apnea. I did print her a copy of her last sleep study and I reviewed it with her in detail. Patient Active Problem List  
 Diagnosis  Ocular hypertension of left eye Both  Age-related osteoporosis without current pathological fracture  Blepharospasm  
  botox Seeing [employer]  Osteoporosis DEXA Results (most recent): 
 
Results from Hospital Encounter encounter on 09/21/16 DEXA BONE DENSITY STUDY AXIAL Narrative Bone Mineral Density Indication:  screening Age: 74 Sex: Female. Menopause status: postmenopausal. 
Hormone replacement therapy: No  
 
Number of falls in the past year:   1 Risk factors for osteoporosis:  None. Current medication for osteoporosis: None. Comparison: None. Technique: Imaging was performed on the [employer] Excluded sites: None Findings: 
 
 
Femoral Neck:  Left Bone mineral density (gm/cm2):? 0.673 
% of peak bone mass: 65 
% for age matched controls:? 87 
T-score: -2.6 Z-score: -0.7 Total Hip: Left Bone mineral density (gm/cm2):  0.870 
% of peak bone mass:   86 
% for age matched controls:  110 T-score:   -1.1 Z-score:  0.6 Lumbar Spine:  L1-L4 Bone mineral density (gm/cm2):  0.863 
% of peak bone mass:  73  
% for age matched controls:  80 
T-score:  -2.7 Z-score:  -0.5 The T score for the left ultra distal radius is -2.5. The T score for the left 
distal third radius is -1.8. Impression Impression: This patient is osteoporotic using the World Health Organization criteria Recommendations: 
Therapy recommendations need to be tailored to each individual patient. Please reconsider testing based on risk factors. Currently, Medicare will only 
reimburse for a central DXA examination every two years, unless the patient is 
on chronic glucocorticoid therapy. Note: Please note that reliable, valid comparisons cannot be made between 
studies which have been performed on machines from different manufacturers. If 
clinically warranted, a follow up study performed at this site, on the same 
unit, would allow the most sensitive assessment of change in bone mineral 
density. 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  
 08/29/18 1513 08/29/18 1527 BP: 150/80 160/82 Pulse: (!) 57 Resp: 12 Temp: 98.7 °F (37.1 °C) TempSrc: Oral   
SpO2: 97% Weight: 115 lb 9.6 oz (52.4 kg) Height: 4' 10\" (1.473 m) 1. Dyslipidemia, goal LDL below 100 
 
- LIPID PANEL 
- METABOLIC PANEL, COMPREHENSIVE 2. Ocular hypertension of left eye Following  op 3. Memory loss From sleep disorder 4. HTN (hypertension), benign Control fair 5. Central sleep apnea syndrome Per   DR Jacy Velazco Prolonged visit with 15 minutes of time out  of more than a  25 minute visit spent counseling patient and family and formulation of care

## 2018-09-26 ENCOUNTER — HOSPITAL ENCOUNTER (OUTPATIENT)
Dept: MAMMOGRAPHY | Age: 68
Discharge: HOME | End: 2018-09-26
Attending: OBSTETRICS & GYNECOLOGY
Payer: MEDICARE

## 2018-09-26 DIAGNOSIS — Z12.31 VISIT FOR SCREENING MAMMOGRAM: ICD-10-CM

## 2018-09-26 PROCEDURE — 77067 SCR MAMMO BI INCL CAD: CPT

## 2018-10-03 ENCOUNTER — TELEPHONE (OUTPATIENT)
Dept: SLEEP MEDICINE | Age: 68
End: 2018-10-03

## 2018-10-03 DIAGNOSIS — G47.33 OBSTRUCTIVE SLEEP APNEA (ADULT) (PEDIATRIC): Primary | ICD-10-CM

## 2018-10-03 NOTE — TELEPHONE ENCOUNTER
Patient called to inform that Dentist she was being referred to did not take Medicare (was not within network). Dr Magdi Delgado office does.   Please write new Dental Referral.

## 2018-10-04 ENCOUNTER — DOCUMENTATION ONLY (OUTPATIENT)
Dept: SLEEP MEDICINE | Age: 68
End: 2018-10-04

## 2018-10-04 NOTE — PROGRESS NOTES
Referral to Dentistry faxed to Dr Tanvi Venegas office. Patient informed. She said she will call back to schedule post dental follow up.

## 2018-11-25 RX ORDER — LISINOPRIL 20 MG/1
TABLET ORAL
Qty: 90 TAB | Refills: 1 | Status: SHIPPED | OUTPATIENT
Start: 2018-11-25 | End: 2019-02-27 | Stop reason: SDUPTHER

## 2019-01-08 ENCOUNTER — OFFICE VISIT (OUTPATIENT)
Dept: INTERNAL MEDICINE CLINIC | Age: 69
End: 2019-01-08

## 2019-01-08 VITALS
RESPIRATION RATE: 16 BRPM | TEMPERATURE: 98.2 F | OXYGEN SATURATION: 98 % | HEIGHT: 58 IN | BODY MASS INDEX: 24.22 KG/M2 | WEIGHT: 115.4 LBS | HEART RATE: 56 BPM | SYSTOLIC BLOOD PRESSURE: 187 MMHG | DIASTOLIC BLOOD PRESSURE: 96 MMHG

## 2019-01-08 DIAGNOSIS — Z13.39 SCREENING FOR ALCOHOLISM: ICD-10-CM

## 2019-01-08 DIAGNOSIS — Z13.31 SCREENING FOR DEPRESSION: ICD-10-CM

## 2019-01-08 DIAGNOSIS — L65.9 HAIR LOSS DISORDER: Primary | ICD-10-CM

## 2019-01-08 DIAGNOSIS — Z00.00 MEDICARE ANNUAL WELLNESS VISIT, SUBSEQUENT: ICD-10-CM

## 2019-01-08 DIAGNOSIS — I10 HTN (HYPERTENSION), BENIGN: ICD-10-CM

## 2019-01-08 RX ORDER — TIMOLOL MALEATE 5 MG/ML
1 SOLUTION/ DROPS OPHTHALMIC 2 TIMES DAILY
COMMUNITY
End: 2021-08-26

## 2019-01-08 RX ORDER — DORZOLAMIDE HCL/PF 2 %
DROPS OPHTHALMIC (EYE)
COMMUNITY
End: 2020-02-24 | Stop reason: ALTCHOICE

## 2019-01-08 NOTE — PROGRESS NOTES
`1. Have you been to the ER, urgent care clinic since your last visit? Hospitalized since your last visit? No 
 
2. Have you seen or consulted any other health care providers outside of the [address] [place] [address] since your last visit? Include any pap smears or colon screening. Neurology-dr Brendon Hemphill, ophthamology, and dr Ck Avila, dr winkler-dentist, dr knigth-December 0601

## 2019-01-08 NOTE — PROGRESS NOTES
This is the Subsequent Medicare Annual Wellness Exam, performed 12 months or more after the Initial AWV or the last Subsequent AWV I have reviewed the patient's medical history in detail and updated the computerized patient record. History Past Medical History:  
Diagnosis Date  Arthritis  Blepharospasm  Hx of mammogram 2018 Negative  Hyperlipidemia  Hypertension  Neuralgia  Osteoporosis 2016 DEXA  Routine Papanicolaou smear 2017 Negative (no hpv)  Sicca syndrome ([facility] [address] Past Surgical History:  
Procedure Laterality Date  HX  SECTION    
 x2  
 HX TUBAL LIGATION    
 90s; L/S Current Outpatient Medications Medication Sig Dispense Refill  timolol (TIMOPTIC) 0.5 % ophthalmic solution 1 Drop two (2) times a day.  dorzolamide HCl/PF (DORZOLAMIDE, PF,) 2 % drop Apply  to eye.  varicella-zoster recombinant, PF, (SHINGRIX, PF,) 50 mcg/0.5 mL susr injection 0.5mL by IntraMUSCular route once now and then repeat in 2-6 months 0.5 mL 1  
 lisinopril (PRINIVIL, ZESTRIL) 20 mg tablet TAKE ONE TABLET BY MOUTH DAILY 90 Tab 1  
 atorvastatin (LIPITOR) 10 mg tablet Take 1 Tab by mouth daily. 90 Tab 3  
 latanoprost (XALATAN) 0.005 % ophthalmic solution Administer 1 Drop to left eye nightly.  RESTASIS 0.05 % ophthalmic emulsion   6 Allergies Allergen Reactions  Codeine Nausea and Vomiting  Keflex [Cephalexin] Nausea and Vomiting Family History Problem Relation Age of Onset  Alzheimer Mother  @ 81yo  Heart Disease Father  Diabetes Father  Alzheimer Father  Cancer Brother Lung ( @ 64yo)  Cancer Sister Hodgkins Disease ( at 44yo)  Other Sister Non-Hodkins Disease  Breast Problems Maternal Aunt Social History Tobacco Use  Smoking status: Never Smoker  Smokeless tobacco: Never Used  Tobacco comment: Never used vapor or e-cigs Substance Use Topics  Alcohol use: No  
  Alcohol/week: 3.0 oz Types: 5 Glasses of wine per week Patient Active Problem List  
Diagnosis Code  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  Osteoporosis M81.0  
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  
 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  Age-related osteoporosis without current pathological fracture M81.0 Depression Risk Factor Screening: PHQ over the last two weeks 1/8/2019 PHQ Not Done - Little interest or pleasure in doing things Several days Feeling down, depressed, irritable, or hopeless Not at all Total Score PHQ 2 1 Alcohol Risk Factor Screening: You do not drink alcohol or very rarely. Functional Ability and Level of Safety:  
Hearing Loss The patient needs further evaluation. Activities of Daily Living The home contains: no safety equipment. Patient does total self care Fall Risk Fall Risk Assessment, last 12 mths 8/29/2018 Able to walk? Yes Fall in past 12 months? No  
 
 
Abuse Screen Patient is not abused Cognitive Screening Evaluation of Cognitive Function: 
Has your family/caregiver stated any concerns about your memory: no 
Normal 
 
Patient Care Team  
Patient Care Team: 
Richard Goff MD as PCP - General (Internal Medicine) Pebbles Blair MD (Ophthalmology) Ulises Montes MD (Neurology) Violeta Ramos MD (Obstetrics & Gynecology) Ina Scott MD as Physician (Sleep Medicine) Assessment/Plan Education and counseling provided: 
Are appropriate based on today's review and evaluation End-of-Life planning (with patient's consent) Pneumococcal Vaccine Diagnoses and all orders for this visit: 
 
1. Medicare annual wellness visit, subsequent 
-     varicella-zoster recombinant, PF, (SHINGRIX, PF,) 50 mcg/0.5 mL susr injection; 0.5mL by IntraMUSCular route once now and then repeat in 2-6 months 2. Screening for depression 
-     Albaro Richardson 3. Screening for alcoholism -     OK ANNUAL ALCOHOL SCREEN 15 MIN Health Maintenance Due Topic Date Due  Shingrix Vaccine Age 50> (1 of 2) 06/04/1998  GLAUCOMA SCREENING Q2Y  12/11/2017  Influenza Age 5 to Adult  08/01/2018  MEDICARE YEARLY EXAM  08/23/2018 Advance Care Planning (ACP) Provider [facility] Date of ACP Conversation: 01/08/19 Persons included in Conversation:  patient Length of ACP Conversation in minutes:  <16 minutes (Non-Billable) Authorized Decision Maker (if patient is incapable of making informed decisions): This person is: Other Legally Authorized Decision Maker (e.g. Next of Kin) For Patients with Decision Making Capacity:  
Values/Goals: Exploration of values, goals, and preferences if recovery is not expected, even with continued medical treatment in the event of:  Severe, permanent brain injury Conversation Outcomes / Follow-Up Plan:  
Recommended communicating the plan and making copies for the healthcare agent, personal physician, and others as appropriate (e.g., health system)

## 2019-01-08 NOTE — PATIENT INSTRUCTIONS
Medicare Wellness Visit, Female The best way to live healthy is to have a lifestyle where you eat a well-balanced diet, exercise regularly, limit alcohol use, and quit all forms of tobacco/nicotine, if applicable. Regular preventive services are another way to keep healthy. Preventive services (vaccines, screening tests, monitoring & exams) can help personalize your care plan, which helps you manage your own care. Screening tests can find health problems at the earliest stages, when they are easiest to treat. [facility] follows the current, evidence-based guidelines published by the [facility] ([facility]) when recommending preventive services for our patients. Because we follow these guidelines, sometimes recommendations change over time as research supports it. (For example, mammograms used to be recommended annually. Even though Medicare will still pay for an annual mammogram, the newer guidelines recommend a mammogram every two years for women of average risk.) Of course, you and your doctor may decide to screen more often for some diseases, based on your risk and your health status. Preventive services for you include: - Medicare offers their members a free annual wellness visit, which is time for you and your primary care provider to discuss and plan for your preventive service needs. Take advantage of this benefit every year! 
-All adults over the age of 72 should receive the recommended pneumonia vaccines. Current USPSTF guidelines recommend a series of two vaccines for the best pneumonia protection.  
-All adults should have a flu vaccine yearly and a tetanus vaccine every 10 years. All adults age 61 and older should receive a shingles vaccine once in their lifetime.   
-A bone mass density test is recommended when a woman turns 65 to screen for osteoporosis. This test is only recommended one time, as a screening. Some providers will use this same test as a disease monitoring tool if you already have osteoporosis. -All adults age 38-68 who are overweight should have a diabetes screening test once every three years.  
-Other screening tests and preventive services for persons with diabetes include: an eye exam to screen for diabetic retinopathy, a kidney function test, a foot exam, and stricter control over your cholesterol.  
-Cardiovascular screening for adults with routine risk involves an electrocardiogram (ECG) at intervals determined by your doctor.  
-Colorectal cancer screenings should be done for adults age 54-65 with no increased risk factors for colorectal cancer. There are a number of acceptable methods of screening for this type of cancer. Each test has its own benefits and drawbacks. Discuss with your doctor what is most appropriate for you during your annual wellness visit. The different tests include: colonoscopy (considered the best screening method), a fecal occult blood test, a fecal DNA test, and sigmoidoscopy. -Breast cancer screenings are recommended every other year for women of normal risk, age 54-69. 
-Cervical cancer screenings for women over age 72 are only recommended with certain risk factors.  
-All adults born between [facility] should be screened once for Hepatitis C. Here is a list of your current Health Maintenance items (your personalized list of preventive services) with a due date: 
Health Maintenance Due Topic Date Due  Shingles Vaccine (1 of 2) 06/04/1998  Glaucoma Screening   12/11/2017  Flu Vaccine  08/01/2018 [facility] Annual Well Visit  08/23/2018

## 2019-02-12 ENCOUNTER — TELEPHONE (OUTPATIENT)
Dept: SLEEP MEDICINE | Age: 69
End: 2019-02-12

## 2019-02-12 ENCOUNTER — OFFICE VISIT (OUTPATIENT)
Dept: SLEEP MEDICINE | Age: 69
End: 2019-02-12

## 2019-02-12 VITALS
DIASTOLIC BLOOD PRESSURE: 79 MMHG | WEIGHT: 116.5 LBS | HEIGHT: 58 IN | HEART RATE: 59 BPM | SYSTOLIC BLOOD PRESSURE: 155 MMHG | BODY MASS INDEX: 24.45 KG/M2 | OXYGEN SATURATION: 99 %

## 2019-02-12 DIAGNOSIS — G47.33 OBSTRUCTIVE SLEEP APNEA (ADULT) (PEDIATRIC): Primary | ICD-10-CM

## 2019-02-12 DIAGNOSIS — I10 ESSENTIAL HYPERTENSION: ICD-10-CM

## 2019-02-12 NOTE — TELEPHONE ENCOUNTER
Dr. Stevie Bishop,     Fatuma Farhan Amanda called and said they no longer make the slumber bump in a size small and wanted to know if you think she should try a medium or a one size fits all or if they would be to big for her.      Thank you

## 2019-02-12 NOTE — PROGRESS NOTES
[address] [facility] [place]. [place], [address]  Tel.  [phone]  Fax. 100 [facility] 60  [address] [facility] [address], [address]  Tel.  [phone]  Fax. [phone] [address] [address] [facility]   Tel.  [phone]  Fax. [phone]       S>Kimberlee Lopez is a 77 y.o. female seen today to receive a home sleep testing unit (HST). · Patient was educated on proper hookup and operation of the HST. · Instruction forms and documentation were reviewed and signed. · The patient demonstrated good understanding of the HST. O>    Visit Vitals  /79 (BP 1 Location: Left arm)   Pulse (!) 59   Ht 4' 10\" (1.473 m)   Wt 116 lb 8 oz (52.8 kg)   SpO2 99%   BMI 24.35 kg/m²         A>  1. Obstructive sleep apnea (adult) (pediatric)    2. Essential hypertension          P>  · General information regarding operations and maintenance of the device was provided. · She was provided information on sleep apnea including coresponding risk factors and the importance of proper treatment. · Follow-up appointment was made to return the HST. She will be contacted once the results have been reviewed. · She was asked to contact our office for any problems regarding her home sleep test study.

## 2019-02-12 NOTE — PROGRESS NOTES
[address] [place]. [place], [address]  Tel.  [phone]  Fax. 100 [facility] 60  [place], [address] [place]  Tel.  [phone]  Fax. [phone] [address] [facility] [address] [address]  Tel.  [phone]  Fax. [phone]     S>Kimberlee Guzman is a 77 y.o. female seen for 3 month F/U after undergoing oral appliance fitting and adjustment. She reports no problems using the device. She is using the device nightly. The following problems are identified:    Drowsiness no Mouth/jaw pain no   Snoring no Forget to put on yes   Device Comfortable yes Can't fall asleep no   Morning Headaches no Frequent awakenings no       She admits that his sleep has improved. Allergies   Allergen Reactions    Codeine Nausea and Vomiting    Keflex [Cephalexin] Nausea and Vomiting       She has a current medication list which includes the following prescription(s): timolol, dorzolamide (pf), lisinopril, atorvastatin, latanoprost, restasis, and varicella-zoster recombinant (pf). .      She  has a past medical history of Arthritis, Blepharospasm, mammogram (09/27/2018), Hyperlipidemia, Hypertension, Neuralgia, Osteoporosis (09/21/2016), Routine Papanicolaou smear (03/21/2017), and Sicca syndrome ([facility] [address] [place] Sleepiness Score: 2   and Modified F.O.S.Q. Score Total / 2: 20   which reflect significantly improved sleep quality over therapy time. O>    Visit Vitals  /79 (BP 1 Location: Left arm)   Pulse (!) 59   Ht 4' 10\" (1.473 m)   Wt 116 lb 8 oz (52.8 kg)   SpO2 99%   BMI 24.35 kg/m²         General:   Alert, oriented, not in distress   Neck:   No JVD    Chest/Lungs:  symetrical lung expansion , no accessory muscle use    Extremities:  no obvious rashes , negative edema    Neuro:  No focal deficits ; No obvious tremor    Psych:  Normal affect ,  Normal countenance ;           A>  1. Obstructive sleep apnea (adult) (pediatric)    2.  Essential hypertension      AHI = 18(3-18). Using an Oral Appliance    Compliant:      yes    Therapeutic Response:  Positive    P>  * Home Sleep Monitoring was ordered to determine efficacy of treatment. Michel Blackburn asked to contact his dentist regarding issues with the appliance. * She was provided information on sleep apnea including coresponding risk factors and the importance of proper treatment. Michel Blackburn likewise counseled on weight management and lateral sleeping posture (with the use of bed pillows or wedge) which may reduce sleep apnea events. Caution with hypnotics, alcohol, and sedating pain medications as these can worsen sleep apnea. * She was counseled on proper sleep hygiene and on safe driving. 2. Hypertension - she continues on her current regimen. I have reviewed the relationship between hypertension as it relates to sleep-disordered breathing.      Electronically signed by    Binta Kennedy MD  Diplomate in Sleep Medicine  [facility]

## 2019-02-12 NOTE — PATIENT INSTRUCTIONS
[address] [facility] [place]. [place], [address]  Tel.  [phone]  Fax. 100 [facility] 60  [place], [address]  Tel.  [phone]  Fax. [phone] [address]  Tel.  [phone]  Fax. [phone]     PROPER SLEEP HYGIENE    What to avoid  · Do not have drinks with caffeine, such as coffee or black tea, for 8 hours before bed. · Do not smoke or use other types of tobacco near bedtime. Nicotine is a stimulant and can keep you awake. · Avoid drinking alcohol late in the evening, because it can cause you to wake in the middle of the night. · Do not eat a big meal close to bedtime. If you are hungry, eat a light snack. · Do not drink a lot of water close to bedtime, because the need to urinate may wake you up during the night. · Do not read or watch TV in bed. Use the bed only for sleeping and sexual activity. What to try  · Go to bed at the same time every night, and wake up at the same time every morning. Do not take naps during the day. · Keep your bedroom quiet, dark, and cool. · Get regular exercise, but not within 3 to 4 hours of your bedtime. .  · Sleep on a comfortable pillow and mattress. · If watching the clock makes you anxious, turn it facing away from you so you cannot see the time. · If you worry when you lie down, start a worry book. Well before bedtime, write down your worries, and then set the book and your concerns aside. · Try meditation or other relaxation techniques before you go to bed. · If you cannot fall asleep, get up and go to another room until you feel sleepy. Do something relaxing. Repeat your bedtime routine before you go to bed again. · Make your house quiet and calm about an hour before bedtime. Turn down the lights, turn off the TV, log off the computer, and turn down the volume on music. This can help you relax after a busy day.     Drowsy Driving  The [address] [place] [address] Road Traffic Safety Administration cites drowsiness as a causing factor in more than 174,266 police reported crashes annually, resulting in 76,000 injuries and 1,500 deaths. Other surveys suggest 55% of people polled have driven while drowsy in the past year, 23% had fallen asleep but not crashed, 3% crashed, and 2% had and accident due to drowsy driving. Who is at risk? Young Drivers: One study of drowsy driving accidents states that 55% of the drivers were under 25 years. Of those, 75% were male. Shift Workers and Travelers: People who work overnight or travel across time zones frequently are at higher risk of experiencing Circadian Rhythm Disorders. They are trying to work and function when their body is programed to sleep. Sleep Deprived: Lack of sleep has a serious impact on your ability to pay attention or focus on a task. Consistently getting less than the average of 8 hours your body needs creates partial or cumulative sleep deprivation. Untreated Sleep Disorders: Sleep Apnea, Narcolepsy, R.L.S., and other sleep disorders (untreated) prevent a person from getting enough restful sleep. This leads to excessive daytime sleepiness and increases the risk for drowsy driving accidents by up to 7 times. Medications / Alcohol: Even over the counter medications can cause drowsiness. Medications that impair a drivers attention should have a warning label. Alcohol naturally makes you sleepy and on its own can cause accidents. Combined with excessive drowsiness its effects are amplified. Signs of Drowsy Driving:   * You don't remember driving the last few miles   * You may drift out of your maribel   * You are unable to focus and your thoughts wander   * You may yawn more often than normal   * You have difficulty keeping your eyes open / nodding off   * Missing traffic signs, speeding, or tailgating  Prevention-   Good sleep hygiene, lifestyle and behavioral choices have the most impact on drowsy driving.  There is no substitute for sleep and the average person requires 8 hours nightly. If you find yourself driving drowsy, stop and sleep. Consider the sleep hygiene tips provided during your visit as well. Medication Refill Policy: Refills for all medications require 1 week advance notice. Please have your pharmacy fax a refill request. We are unable to fax, or call in \"controled substance\" medications and you will need to pick these prescriptions up from our office. [employer] Activation    Thank you for requesting access to [employer] [facility]. Please follow the instructions below to securely access and download your online medical record. [facility] allows you to send messages to your doctor, view your test results, renew your prescriptions, schedule appointments, and more. How Do I Sign Up? 1. In your internet browser, go to [email or web address]. 2. Click on the First Time User? Click Here link in the Sign In box. You will see the New Member Sign Up page. 3. Enter your [employer] [facility] Access Code exactly as it appears below. You will not need to use this code after youve completed the sign-up process. If you do not sign up before the expiration date, you must request a new code. [employer] [facility] Access Code: Activation code not generated  Current [facility] Status: Active (This is the date your [employer] [facility] access code will )    4. Enter the last four digits of your Social Security Number (xxxx) and Date of Birth (mm/dd/yyyy) as indicated and click Submit. You will be taken to the next sign-up page. 5. Create a [facility] ID. This will be your [facility] login ID and cannot be changed, so think of one that is secure and easy to remember. 6. Create a [employer] [facility] password. You can change your password at any time. 7. Enter your Password Reset Question and Answer. This can be used at a later time if you forget your password. 8. Enter your e-mail address. You will receive e-mail notification when new information is available in [address]   9. Click Sign Up. You can now view and download portions of your medical record. 10. Click the Download Summary menu link to download a portable copy of your medical information. Additional Information    If you have questions, please call [phone]. Remember, [employer] is NOT to be used for urgent needs. For medical emergencies, dial 911.

## 2019-02-19 ENCOUNTER — TELEPHONE (OUTPATIENT)
Dept: SLEEP MEDICINE | Age: 69
End: 2019-02-19

## 2019-02-19 NOTE — TELEPHONE ENCOUNTER
Patient called again asking for slumber bump suggestion. She said she does not mind using any other alternative to slumber bump, if there is any.

## 2019-02-19 NOTE — TELEPHONE ENCOUNTER
She would have to inquire with [address] [place] [address] for dimensions that it would fit. If unable to fit, then she can try propping herself on the side with a large firm pillow (like a body pillow).    Lets do the HSAT with that and the oral appliance

## 2019-02-22 ENCOUNTER — HOSPITAL ENCOUNTER (OUTPATIENT)
Dept: LAB | Age: 69
Discharge: HOME | End: 2019-02-22
Payer: MEDICARE

## 2019-02-22 PROCEDURE — 85025 COMPLETE CBC W/AUTO DIFF WBC: CPT

## 2019-02-22 PROCEDURE — 80053 COMPREHEN METABOLIC PANEL: CPT

## 2019-02-22 PROCEDURE — 84443 ASSAY THYROID STIM HORMONE: CPT

## 2019-02-22 PROCEDURE — 36415 COLL VENOUS BLD VENIPUNCTURE: CPT

## 2019-02-22 PROCEDURE — 80061 LIPID PANEL: CPT

## 2019-02-23 LAB
ALBUMIN SERPL-MCNC: 4.4 G/DL (ref 3.5–4.8)
ALBUMIN/GLOB SERPL: 1.8 {RATIO} (ref 1.2–2.2)
ALP SERPL-CCNC: 65 IU/L (ref 39–117)
ALT SERPL-CCNC: 23 IU/L (ref 0–32)
AST SERPL-CCNC: 21 IU/L (ref 0–40)
BASOPHILS # BLD AUTO: 0 X10E3/UL (ref 0–0.2)
BASOPHILS NFR BLD AUTO: 1 %
BILIRUB SERPL-MCNC: 0.6 MG/DL (ref 0–1.2)
BUN SERPL-MCNC: 12 MG/DL (ref 8–27)
BUN/CREAT SERPL: 17 (ref 12–28)
CALCIUM SERPL-MCNC: 9.5 MG/DL (ref 8.7–10.3)
CHLORIDE SERPL-SCNC: 106 MMOL/L (ref 96–106)
CHOLEST SERPL-MCNC: 157 MG/DL (ref 100–199)
CO2 SERPL-SCNC: 26 MMOL/L (ref 20–29)
CREAT SERPL-MCNC: 0.69 MG/DL (ref 0.57–1)
EOSINOPHIL # BLD AUTO: 0.1 X10E3/UL (ref 0–0.4)
EOSINOPHIL NFR BLD AUTO: 2 %
ERYTHROCYTE [DISTWIDTH] IN BLOOD BY AUTOMATED COUNT: 13.7 % (ref 12.3–15.4)
GLOBULIN SER CALC-MCNC: 2.4 G/DL (ref 1.5–4.5)
GLUCOSE SERPL-MCNC: 96 MG/DL (ref 65–99)
HCT VFR BLD AUTO: 38.6 % (ref 34–46.6)
HDLC SERPL-MCNC: 77 MG/DL
HGB BLD-MCNC: 12.9 G/DL (ref 11.1–15.9)
IMM GRANULOCYTES # BLD AUTO: 0 X10E3/UL (ref 0–0.1)
IMM GRANULOCYTES NFR BLD AUTO: 0 %
INTERPRETATION, 910389: NORMAL
LDLC SERPL CALC-MCNC: 69 MG/DL (ref 0–99)
LYMPHOCYTES # BLD AUTO: 1.1 X10E3/UL (ref 0.7–3.1)
LYMPHOCYTES NFR BLD AUTO: 33 %
MCH RBC QN AUTO: 30.3 PG (ref 26.6–33)
MCHC RBC AUTO-ENTMCNC: 33.4 G/DL (ref 31.5–35.7)
MCV RBC AUTO: 91 FL (ref 79–97)
MONOCYTES # BLD AUTO: 0.2 X10E3/UL (ref 0.1–0.9)
MONOCYTES NFR BLD AUTO: 7 %
NEUTROPHILS # BLD AUTO: 1.9 X10E3/UL (ref 1.4–7)
NEUTROPHILS NFR BLD AUTO: 57 %
PLATELET # BLD AUTO: 158 X10E3/UL (ref 150–379)
POTASSIUM SERPL-SCNC: 4.6 MMOL/L (ref 3.5–5.2)
PROT SERPL-MCNC: 6.8 G/DL (ref 6–8.5)
RBC # BLD AUTO: 4.26 X10E6/UL (ref 3.77–5.28)
SODIUM SERPL-SCNC: 145 MMOL/L (ref 134–144)
TRIGL SERPL-MCNC: 55 MG/DL (ref 0–149)
TSH SERPL DL<=0.005 MIU/L-ACNC: 3.19 UIU/ML (ref 0.45–4.5)
VLDLC SERPL CALC-MCNC: 11 MG/DL (ref 5–40)
WBC # BLD AUTO: 3.4 X10E3/UL (ref 3.4–10.8)

## 2019-02-25 ENCOUNTER — HOSPITAL ENCOUNTER (OUTPATIENT)
Dept: SLEEP MEDICINE | Age: 69
Discharge: HOME | End: 2019-02-25
Payer: MEDICARE

## 2019-02-25 ENCOUNTER — OFFICE VISIT (OUTPATIENT)
Dept: SLEEP MEDICINE | Age: 69
End: 2019-02-25

## 2019-02-25 DIAGNOSIS — G47.33 OSA (OBSTRUCTIVE SLEEP APNEA): Primary | ICD-10-CM

## 2019-02-25 PROCEDURE — 95806 SLEEP STUDY UNATT&RESP EFFT: CPT | Performed by: INTERNAL MEDICINE

## 2019-02-26 ENCOUNTER — DOCUMENTATION ONLY (OUTPATIENT)
Dept: SLEEP MEDICINE | Age: 69
End: 2019-02-26

## 2019-02-27 ENCOUNTER — OFFICE VISIT (OUTPATIENT)
Dept: INTERNAL MEDICINE CLINIC | Age: 69
End: 2019-02-27

## 2019-02-27 VITALS
TEMPERATURE: 98.4 F | HEART RATE: 65 BPM | SYSTOLIC BLOOD PRESSURE: 140 MMHG | HEIGHT: 58 IN | DIASTOLIC BLOOD PRESSURE: 73 MMHG | BODY MASS INDEX: 24.05 KG/M2 | OXYGEN SATURATION: 100 % | WEIGHT: 114.6 LBS

## 2019-02-27 DIAGNOSIS — E78.5 DYSLIPIDEMIA, GOAL LDL BELOW 100: Primary | ICD-10-CM

## 2019-02-27 DIAGNOSIS — I10 HTN (HYPERTENSION), BENIGN: ICD-10-CM

## 2019-02-27 RX ORDER — LISINOPRIL 20 MG/1
20 TABLET ORAL DAILY
Qty: 90 TAB | Refills: 1 | Status: SHIPPED | OUTPATIENT
Start: 2019-02-27 | End: 2019-08-29 | Stop reason: ALTCHOICE

## 2019-02-28 ENCOUNTER — TELEPHONE (OUTPATIENT)
Dept: SLEEP MEDICINE | Age: 69
End: 2019-02-28

## 2019-02-28 NOTE — TELEPHONE ENCOUNTER
Results of sleep study in [email or web address] to convey results to patient  Home sleep test shows AHI- 10/hour. The oral appliance has improved the apnea (previously 18/hour). But still have some apneas   she should  contact his dentist to see if any further adjustments could be done with the appliance. (she didn't spend much time on her back during recording which was good!)  * Caution with hypnotics, alcohol, and sedating pain medications as these can worsen sleep apnea.

## 2019-05-23 NOTE — PROGRESS NOTES
Annual exam ages 65+    Denis Munoz is a ,  77 y.o. female [facility] No LMP recorded. Patient is postmenopausal.  She presents for her annual checkup. LV 18 for AE    Will be doing 5K this Saturday [facility]. Last colonoscopy ~    Menstrual status:    Post-menopause. Hormonal status:  She reports no perimenstrual type symptoms. She is not having vasomotor symptoms. The patient is not using any ERT. Sexual history:    She  reports that she does not currently engage in sexual activity but has had partners who are Male. She reports using the following method of birth control/protection: Surgical.    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 Tx'd with steroid injections to prevent additional loss. Followed by [facility]. - taking turmeric for arthritis with good results    Pap and Mammogram History:    Her most recent Pap smear was normal obtained on 3/21/17. The patient had a recent mammogram 18 which was negative for malignancy. Breast Cancer History/Substance Abuse: negative    Osteoporosis History:    Family history does not include a first or second degree relative with osteopenia or osteoporosis. A bone density scan was obtained 16 and revealed osteoporosis. She is currently taking calcium and vit D.     Past Medical History:   Diagnosis Date    Arthritis     Blepharospasm     Hx of mammogram 2018    Negative    Hyperlipidemia     Hypertension     Lichen planus bullosus     Neuralgia     Osteoporosis 2016    DEXA    Routine Papanicolaou smear 2017    Negative (no hpv)     Sicca syndrome (HCC)      Past Surgical History:   Procedure Laterality Date    HX  SECTION      x2    HX TUBAL LIGATION 90s; L/S       Current Outpatient Medications   Medication Sig Dispense Refill    omega-3 fatty acids (FISH OIL CONCENTRATE PO) Take  by mouth.  multivitamin (ONE A DAY) tablet Take 1 Tab by mouth daily.  lisinopril (PRINIVIL, ZESTRIL) 20 mg tablet Take 1 Tab by mouth daily. 90 Tab 1    timolol (TIMOPTIC) 0.5 % ophthalmic solution 1 Drop two (2) times a day.  dorzolamide HCl/PF (DORZOLAMIDE, PF,) 2 % drop Apply  to eye.  varicella-zoster recombinant, PF, (SHINGRIX, PF,) 50 mcg/0.5 mL susr injection 0.5mL by IntraMUSCular route once now and then repeat in 2-6 months 0.5 mL 1    atorvastatin (LIPITOR) 10 mg tablet Take 1 Tab by mouth daily. 90 Tab 3    latanoprost (XALATAN) 0.005 % ophthalmic solution Administer 1 Drop to left eye nightly.  RESTASIS 0.05 % ophthalmic emulsion   6     Allergies: Codeine and Keflex [cephalexin]     Tobacco History:  reports that she has never smoked. She has never used smokeless tobacco.  Alcohol Abuse:  reports that she does not drink alcohol. Drug Abuse:  reports that she does not use drugs.     Family Medical/Cancer History:   Family History   Problem Relation Age of Onset   [facility] Alzheimer Mother          @ 81yo    Heart Disease Father     Diabetes Father     Alzheimer Father     Cancer Brother         Lung ( @ 64yo)   [facility] Cancer Sister         Hodgkins Disease ( at 42yo)   [facility] Other Sister         Non-Hodkins Disease     Breast Problems Maternal Aunt         Review of Systems - History obtained from the patient  Constitutional: negative for weight loss, fever, night sweats  HEENT: negative for hearing loss, earache, snoring, sorethroat; +chronic congestion  CV: negative for chest pain, palpitations, edema  Resp: negative for cough, shortness of breath, wheezing  GI: negative for change in bowel habits, abdominal pain, black or bloody stools  : negative for frequency, dysuria, hematuria, vaginal discharge  MSK: negative for muscle pain: +bunion, +arthritis  Breast: negative for breast lumps, nipple discharge, galactorrhea  Skin :negative for itching, rash, hives  Neuro: negative for dizziness, headache, confusion, weakness  Psych: negative for anxiety, depression, change in mood  Heme/lymph: negative for bleeding, bruising, pallor    Physical Exam    Visit Vitals  BP (!) 159/91   Pulse (!) 58   Ht 4' 10\" (1.473 m)   Wt 115 lb (52.2 kg)   BMI 24.04 kg/m²       Constitutional  · Appearance: well-nourished, well developed, alert, in no acute distress    HENT  · Head and Face: appears normal    Neck  · Inspection/Palpation: normal appearance, no masses or tenderness  · Lymph Nodes: no lymphadenopathy present  · Thyroid: gland size normal, nontender, no nodules or masses present on palpation    Chest  · Respiratory Effort: breathing unlabored  · Auscultation: normal breath sounds    Cardiovascular  · Heart:  · Auscultation: regular rate and rhythm without murmur    Breasts  · Inspection of Breasts: breasts symmetrical, no skin changes, no discharge present, nipple appearance normal, no skin retraction present  · Palpation of Breasts and Axillae: no masses present on palpation, no breast tenderness  · Axillary Lymph Nodes: no lymphadenopathy present    Gastrointestinal  · Abdominal Examination: abdomen non-tender to palpation, normal bowel sounds, no masses present  · Liver and spleen: no hepatomegaly present, spleen not palpable  · Hernias: no hernias identified    Genitourinary  · External Genitalia: normal appearance for age, no discharge present, no tenderness present, no inflammatory lesions present, no masses present, atrophy present  · Vagina: atrophic but otherwise normal vaginal vault without central or paravaginal defects, no discharge present, no inflammatory lesions present, no masses present  · Bladder: non-tender to palpation  · Urethra: appears normal  · Cervix: normal   · Uterus: normal size, shape and consistency  · Adnexa: no adnexal tenderness present, no adnexal masses present  · Perineum: perineum within normal limits, no evidence of trauma, no rashes or skin lesions present  · Anus: anus within normal limits, no hemorrhoids present  · Inguinal Lymph Nodes: no lymphadenopathy present  RV:  No masses    Skin  · General Inspection: no rash, no lesions identified    Neurologic/Psychiatric  · Mental Status:  · Orientation: grossly oriented to person, place and time  · Mood and Affect: mood normal, affect appropriate    Assessment:  Routine gynecologic examination  Her current medical status is satisfactory with no evidence of significant gynecologic issues.   Pap today  Osteoporosis - followed by PCP, Dr. William Blossom:  Counseled re: diet, exercise, healthy lifestyle  Return for yearly wellness visits  Rec annual mammogram - due in Sept, does at [place]  Pap done

## 2019-05-24 ENCOUNTER — OFFICE VISIT (OUTPATIENT)
Dept: OBGYN CLINIC | Age: 69
End: 2019-05-24

## 2019-05-24 VITALS
SYSTOLIC BLOOD PRESSURE: 159 MMHG | BODY MASS INDEX: 24.14 KG/M2 | HEART RATE: 58 BPM | HEIGHT: 58 IN | WEIGHT: 115 LBS | DIASTOLIC BLOOD PRESSURE: 91 MMHG

## 2019-05-24 DIAGNOSIS — Z01.419 ENCOUNTER FOR GYNECOLOGICAL EXAMINATION WITHOUT ABNORMAL FINDING: Primary | ICD-10-CM

## 2019-05-24 NOTE — PATIENT INSTRUCTIONS

## 2019-05-30 LAB
CYTOLOGIST CVX/VAG CYTO: NORMAL
CYTOLOGY CVX/VAG DOC CYTO: NORMAL
CYTOLOGY CVX/VAG DOC THIN PREP: NORMAL
CYTOLOGY HISTORY:: NORMAL
DX ICD CODE: NORMAL
LABCORP, 190119: NORMAL
Lab: NORMAL
OTHER STN SPEC: NORMAL
STAT OF ADQ CVX/VAG CYTO-IMP: NORMAL

## 2019-08-15 RX ORDER — ATORVASTATIN CALCIUM 10 MG/1
TABLET, FILM COATED ORAL
Qty: 90 TAB | Refills: 2 | Status: SHIPPED | OUTPATIENT
Start: 2019-08-15 | End: 2020-05-09

## 2019-08-17 ENCOUNTER — APPOINTMENT (OUTPATIENT)
Dept: GENERAL RADIOLOGY | Age: 69
End: 2019-08-17
Attending: EMERGENCY MEDICINE
Payer: MEDICARE

## 2019-08-17 ENCOUNTER — HOSPITAL ENCOUNTER (EMERGENCY)
Age: 69
Discharge: HOME | End: 2019-08-17
Attending: EMERGENCY MEDICINE
Payer: MEDICARE

## 2019-08-17 VITALS
HEART RATE: 58 BPM | RESPIRATION RATE: 10 BRPM | TEMPERATURE: 98.7 F | SYSTOLIC BLOOD PRESSURE: 208 MMHG | HEIGHT: 58 IN | BODY MASS INDEX: 24.14 KG/M2 | OXYGEN SATURATION: 100 % | WEIGHT: 115 LBS | DIASTOLIC BLOOD PRESSURE: 78 MMHG

## 2019-08-17 DIAGNOSIS — R07.9 CHEST PAIN IN ADULT: Primary | ICD-10-CM

## 2019-08-17 LAB
ALBUMIN SERPL-MCNC: 4.1 G/DL (ref 3.5–5)
ALBUMIN/GLOB SERPL: 1.1 {RATIO} (ref 1.1–2.2)
ALP SERPL-CCNC: 94 U/L (ref 45–117)
ALT SERPL-CCNC: 29 U/L (ref 12–78)
ANION GAP SERPL CALC-SCNC: 1 MMOL/L (ref 5–15)
AST SERPL-CCNC: 23 U/L (ref 15–37)
BASOPHILS # BLD: 0.1 K/UL (ref 0–0.1)
BASOPHILS NFR BLD: 1 % (ref 0–1)
BILIRUB SERPL-MCNC: 0.7 MG/DL (ref 0.2–1)
BUN SERPL-MCNC: 7 MG/DL (ref 6–20)
BUN/CREAT SERPL: 10 (ref 12–20)
CALCIUM SERPL-MCNC: 9.1 MG/DL (ref 8.5–10.1)
CHLORIDE SERPL-SCNC: 107 MMOL/L (ref 97–108)
CO2 SERPL-SCNC: 33 MMOL/L (ref 21–32)
CREAT SERPL-MCNC: 0.7 MG/DL (ref 0.55–1.02)
D DIMER PPP FEU-MCNC: 0.19 MG/L FEU (ref 0–0.65)
DIFFERENTIAL METHOD BLD: NORMAL
EOSINOPHIL # BLD: 0.2 K/UL (ref 0–0.4)
EOSINOPHIL NFR BLD: 4 % (ref 0–7)
ERYTHROCYTE [DISTWIDTH] IN BLOOD BY AUTOMATED COUNT: 12.4 % (ref 11.5–14.5)
GLOBULIN SER CALC-MCNC: 3.8 G/DL (ref 2–4)
GLUCOSE SERPL-MCNC: 87 MG/DL (ref 65–100)
HCT VFR BLD AUTO: 43.2 % (ref 35–47)
HGB BLD-MCNC: 14.2 G/DL (ref 11.5–16)
IMM GRANULOCYTES # BLD AUTO: 0 K/UL (ref 0–0.04)
IMM GRANULOCYTES NFR BLD AUTO: 0 % (ref 0–0.5)
LYMPHOCYTES # BLD: 0.9 K/UL (ref 0.8–3.5)
LYMPHOCYTES NFR BLD: 15 % (ref 12–49)
MAGNESIUM SERPL-MCNC: 2.4 MG/DL (ref 1.6–2.4)
MCH RBC QN AUTO: 30.9 PG (ref 26–34)
MCHC RBC AUTO-ENTMCNC: 32.9 G/DL (ref 30–36.5)
MCV RBC AUTO: 93.9 FL (ref 80–99)
MONOCYTES # BLD: 0.5 K/UL (ref 0–1)
MONOCYTES NFR BLD: 9 % (ref 5–13)
NEUTS SEG # BLD: 4 K/UL (ref 1.8–8)
NEUTS SEG NFR BLD: 71 % (ref 32–75)
NRBC # BLD: 0 K/UL (ref 0–0.01)
NRBC BLD-RTO: 0 PER 100 WBC
PLATELET # BLD AUTO: 228 K/UL (ref 150–400)
PMV BLD AUTO: 9.5 FL (ref 8.9–12.9)
POTASSIUM SERPL-SCNC: 3.6 MMOL/L (ref 3.5–5.1)
PROT SERPL-MCNC: 7.9 G/DL (ref 6.4–8.2)
RBC # BLD AUTO: 4.6 M/UL (ref 3.8–5.2)
SODIUM SERPL-SCNC: 141 MMOL/L (ref 136–145)
TROPONIN I SERPL-MCNC: <0.05 NG/ML
TROPONIN I SERPL-MCNC: <0.05 NG/ML
WBC # BLD AUTO: 5.6 K/UL (ref 3.6–11)

## 2019-08-17 PROCEDURE — 74011250637 HC RX REV CODE- 250/637: Performed by: PHYSICIAN ASSISTANT

## 2019-08-17 PROCEDURE — 83735 ASSAY OF MAGNESIUM: CPT

## 2019-08-17 PROCEDURE — 84484 ASSAY OF TROPONIN QUANT: CPT

## 2019-08-17 PROCEDURE — 85025 COMPLETE CBC W/AUTO DIFF WBC: CPT

## 2019-08-17 PROCEDURE — 99285 EMERGENCY DEPT VISIT HI MDM: CPT

## 2019-08-17 PROCEDURE — 71046 X-RAY EXAM CHEST 2 VIEWS: CPT

## 2019-08-17 PROCEDURE — 80053 COMPREHEN METABOLIC PANEL: CPT

## 2019-08-17 PROCEDURE — 93005 ELECTROCARDIOGRAM TRACING: CPT

## 2019-08-17 PROCEDURE — 36415 COLL VENOUS BLD VENIPUNCTURE: CPT

## 2019-08-17 PROCEDURE — 85379 FIBRIN DEGRADATION QUANT: CPT

## 2019-08-17 RX ORDER — LISINOPRIL 20 MG/1
20 TABLET ORAL
Status: COMPLETED | OUTPATIENT
Start: 2019-08-17 | End: 2019-08-17

## 2019-08-17 RX ORDER — GUAIFENESIN 100 MG/5ML
325 LIQUID (ML) ORAL
Status: COMPLETED | OUTPATIENT
Start: 2019-08-17 | End: 2019-08-17

## 2019-08-17 RX ORDER — ASPIRIN 81 MG/1
81 TABLET ORAL DAILY
Qty: 15 TAB | Refills: 0 | Status: SHIPPED | OUTPATIENT
Start: 2019-08-17 | End: 2019-08-29 | Stop reason: ALTCHOICE

## 2019-08-17 RX ADMIN — LISINOPRIL 20 MG: 20 TABLET ORAL at 17:02

## 2019-08-17 RX ADMIN — ASPIRIN 81 MG 324 MG: 81 TABLET ORAL at 15:52

## 2019-08-17 NOTE — DISCHARGE INSTRUCTIONS
Rest, push clear liquids. Continue your regular medication as directed. Chest Pain: Care Instructions  Your Care Instructions    There are many things that can cause chest pain. Some are not serious and will get better on their own in a few days. But some kinds of chest pain need more testing and treatment. Your doctor may have recommended a follow-up visit in the next 8 to 12 hours. If you are not getting better, you may need more tests or treatment. Even though your doctor has released you, you still need to watch for any problems. The doctor carefully checked you, but sometimes problems can develop later. If you have new symptoms or if your symptoms do not get better, get medical care right away. If you have worse or different chest pain or pressure that lasts more than 5 minutes or you passed out (lost consciousness), call 911 or seek other emergency help right away. A medical visit is only one step in your treatment. Even if you feel better, you still need to do what your doctor recommends, such as going to all suggested follow-up appointments and taking medicines exactly as directed. This will help you recover and help prevent future problems. How can you care for yourself at home? · Rest until you feel better. · Take your medicine exactly as prescribed. Call your doctor if you think you are having a problem with your medicine. · Do not drive after taking a prescription pain medicine. When should you call for help? Call 911 if:    · You passed out (lost consciousness).     · You have severe difficulty breathing.     · You have symptoms of a heart attack. These may include:  ? Chest pain or pressure, or a strange feeling in your chest.  ? Sweating. ? Shortness of breath. ? Nausea or vomiting. ? Pain, pressure, or a strange feeling in your back, neck, jaw, or upper belly or in one or both shoulders or arms. ? Lightheadedness or sudden weakness. ? A fast or irregular heartbeat.   After you call 911, the  may tell you to chew 1 adult-strength or 2 to 4 low-dose aspirin. Wait for an ambulance. Do not try to drive yourself.    Call your doctor today if:    · You have any trouble breathing.     · Your chest pain gets worse.     · You are dizzy or lightheaded, or you feel like you may faint.     · You are not getting better as expected.     · You are having new or different chest pain. Where can you learn more? Go to http://kristin-rajni.info/. Enter A120 in the search box to learn more about \"Chest Pain: Care Instructions. \"  Current as of: September 23, 2018  Content Version: 12.1  © 3661-0192 [employer]. Care instructions adapted under license by [employer] (which disclaims liability or warranty for this information). If you have questions about a medical condition or this instruction, always ask your healthcare professional. [address] any warranty or liability for your use of this information.

## 2019-08-17 NOTE — ED PROVIDER NOTES
Markus Russell is a 68 y.o. female who presents ambulatory to the ED with a c/o chest pain. Patient notes that she awakened proximately 3 AM from sleep with some left upper chest pain. She notes that the pain is a squeezing pain she states that it radiated up to her left shoulder. She also notes that it went to her arm. She states that she took some aspirin over-the-counter. She  also tried vinegar as well. Her symptoms lasted approximately 10 minutes. She notes that she has no pain currently. Patient denies any other associated symptoms. She denies fever, chills, nausea, vomiting, abdominal pain, diarrhea, shortness of breath, diaphoresis. She is not sure if the pain was worse with movement or with rest.  She states that she has never had pain with stress or movement in the past.  She typically walks 2 to 3 miles per day without any problems. She notes that she was seen by cardiology for an echocardiogram prior to a sleep study years ago. Chart review shows a negative echo 5/2018. She denies ever having had a stress test formally done. Patient denies recent travel, recent injury, prolonged sitting, recent procedure. She notes that she is very anxious about her pain. She states she normally takes her blood pressure medicine in the evening. She has not taken her dose for today yet. She continues to be pain-free currently. 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 Pt is a 68 y.o. F with PMH of HTN, HLD here with c/o CP. Chest pain last night. It woke her from sleep around 3 AM.  She does have PADMINI and is unsure if that or pain woke her from sleep. Pain was \"squeezing\"  She felt pain radiate to her left shoulder and arm. She denies chest pain currently but says arm feels sore. Tired ASA and vinegar earlier this AM.  No cough, no shortness of breath, no N/V and no dizziness or diaphoresis.       Past Medical History:   Diagnosis Date    Arthritis     Gama esophagus     Blepharospasm     Cataract     Hx of mammogram 2018    Negative    Hyperlipidemia     Hypertension     Lichen planus bullosus     Neuralgia     Ocular hypertelorism     Osteoporosis 2016    DEXA    Routine Papanicolaou smear 2019    Negative (no hpv)     Sicca syndrome (HCC)     Sleep apnea        Past Surgical History:   Procedure Laterality Date    HX  SECTION      x2    HX TUBAL LIGATION      90s; L/S         Family History:   Problem Relation Age of Onset   [facility] Alzheimer Mother          @ 81yo    Heart Disease Father     Diabetes Father     Alzheimer Father     Cancer Brother         Lung ( @ 62yo)   [facility] Cancer Sister         Hodgkins Disease ( at 44yo)   [facility] Other Sister         Non-Hodkins Disease     Breast Problems Maternal Aunt        Social History     Socioeconomic History    Marital status:      Spouse name: Not on file    Number of children: Not on file    Years of education: Not on file    Highest education level: Not on file   Occupational History    Not on file   Social Needs    Financial resource strain: Not on file    Food insecurity:     Worry: Not on file     Inability: Not on file   [facility] Transportation needs:     Medical: Not on file     Non-medical: Not on file   Tobacco Use    Smoking status: Never Smoker    Smokeless tobacco: Never Used    Tobacco comment: Never used vapor or e-cigs    Substance and Sexual Activity    Alcohol use: No     Alcohol/week: 5.0 standard drinks     Types: 5 Glasses of wine per week    Drug use: No    Sexual activity: Not Currently     Partners: Male     Birth control/protection: Surgical   Lifestyle    Physical activity:     Days per week: Not on file     Minutes per session: Not on file    Stress: Not on file   Relationships    Social connections:     Talks on phone: Not on file     Gets together: Not on file     Attends Buddhism service: Not on file     Active member of club or organization: Not on file     Attends meetings of clubs or organizations: Not on file     Relationship status: Not on file    Intimate partner violence:     Fear of current or ex partner: Not on file     Emotionally abused: Not on file     Physically abused: Not on file     Forced sexual activity: Not on file   Other Topics Concern    Not on file   Social History Narrative    Not on file         ALLERGIES: Codeine and Keflex [cephalexin]    Review of Systems   Constitutional: Negative for chills and fever. HENT: Negative for congestion, rhinorrhea, sneezing and sore throat. Eyes: Negative for redness and visual disturbance. Respiratory: Negative for shortness of breath. Cardiovascular: Positive for chest pain. Negative for leg swelling. Gastrointestinal: Negative for abdominal pain, nausea and vomiting. Genitourinary: Negative for difficulty urinating and frequency. Musculoskeletal: Negative for back pain, myalgias and neck stiffness. Skin: Negative for rash. Neurological: Negative for dizziness, syncope, weakness and headaches. Hematological: Negative for adenopathy.        Patient Vitals for the past 12 hrs:   Temp Pulse Resp BP SpO2   08/17/19 1700  (!) 58 10 (!) 208/78 100 %   08/17/19 1645  61 13 187/89 100 %   08/17/19 1630  62 13 181/78 98 %   08/17/19 1552    186/86 99 %   08/17/19 1258 98.7 °F (37.1 °C) 70 14 (!) 218/101 98 %              Physical Exam   Constitutional: She is oriented to person, place, and time. She appears well-developed and well-nourished. No distress. HENT:   Head: Normocephalic and atraumatic. Right Ear: External ear normal.   Left Ear: External ear normal.   Eyes: Pupils are equal, round, and reactive to light. EOM are normal.   Neck: Neck supple. Cardiovascular: Normal rate, regular rhythm, normal heart sounds and intact distal pulses. Exam reveals no gallop and no friction rub. No murmur heard. Pulmonary/Chest: Effort normal and breath sounds normal. No stridor. No respiratory distress. She has no wheezes. She has no rales. She exhibits no tenderness. Abdominal: Soft. Bowel sounds are normal. She exhibits no distension and no mass. There is no tenderness. There is no rebound and no guarding. No hernia. Musculoskeletal: Normal range of motion. She exhibits no edema, tenderness or deformity. Neurological: She is alert and oriented to person, place, and time. No cranial nerve deficit. Coordination normal.   Skin: Skin is warm and dry. Capillary refill takes less than 2 seconds. No rash noted. No erythema. No pallor. Psychiatric: She has a normal mood and affect. Her behavior is normal.   Nursing note and vitals reviewed.        MDM  Number of Diagnoses or Management Options  Chest pain in adult:      Amount and/or Complexity of Data Reviewed  Clinical lab tests: ordered and reviewed  Tests in the radiology section of CPT®: ordered and reviewed  Tests in the medicine section of CPT®: reviewed and ordered  Review and summarize past medical records: yes  Independent visualization of images, tracings, or specimens: yes    Patient Progress  Patient progress: stable         Procedures  Discussed with patient at length the need for blood pressure re-evaluation and management with primary care. Discussed the long term sequelae for elevated blood pressure including blindness, CVA, MI, and renal failure/ dialysis. Pt agrees to follow up as directed. MICHEAL Valdivia       ED EKG interpretation:  Rhythm: normal sinus rhythm; and regular . Rate (approx.): 64; Axis: normal; P wave: normal; QRS interval: normal ; ST/T wave: normal; EKG documented by Svetlana Aldridge MD, scribe, as interpreted by Mookie Rhodes MD, ED MD.    . 3:38 PM  Discussed pt, sx, hx and current findings with Mookie Rhodes MD. She is in agreement with plan and will see pt. Will get labs, ekg, cxr and continue to monitor  Ann Domínguez. PIA Ledbetter    4:35 PM   Pt had trop x 2, ekg, xray, labs, non acute. Walks 2-3 miles daily. Neg echo 5/2018. No sx currently. Will dc to follow with cardiology  Ann Domínguez. PIA Ledbetter    LABORATORY TESTS:  Recent Results (from the past 12 hour(s))   CBC WITH AUTOMATED DIFF    Collection Time: 08/17/19  1:28 PM   Result Value Ref Range    WBC 5.6 3.6 - 11.0 K/uL    RBC 4.60 3.80 - 5.20 M/uL    HGB 14.2 11.5 - 16.0 g/dL    HCT 43.2 35.0 - 47.0 %    MCV 93.9 80.0 - 99.0 FL    MCH 30.9 26.0 - 34.0 PG    MCHC 32.9 30.0 - 36.5 g/dL    RDW 12.4 11.5 - 14.5 %    PLATELET 402 401 - 027 K/uL    MPV 9.5 8.9 - 12.9 FL    NRBC 0.0 0  WBC    ABSOLUTE NRBC 0.00 0.00 - 0.01 K/uL    NEUTROPHILS 71 32 - 75 %    LYMPHOCYTES 15 12 - 49 %    MONOCYTES 9 5 - 13 %    EOSINOPHILS 4 0 - 7 %    BASOPHILS 1 0 - 1 %    IMMATURE GRANULOCYTES 0 0.0 - 0.5 %    ABS. NEUTROPHILS 4.0 1.8 - 8.0 K/UL    ABS. LYMPHOCYTES 0.9 0.8 - 3.5 K/UL    ABS. MONOCYTES 0.5 0.0 - 1.0 K/UL    ABS. EOSINOPHILS 0.2 0.0 - 0.4 K/UL    ABS. BASOPHILS 0.1 0.0 - 0.1 K/UL    ABS. IMM.  GRANS. 0.0 0.00 - 0.04 K/UL    DF AUTOMATED     D DIMER    Collection Time: 08/17/19  1:28 PM   Result Value Ref Range    D-dimer 0.19 0.00 - 0.65 mg/L FEU   METABOLIC PANEL, COMPREHENSIVE    Collection Time: 08/17/19  1:28 PM   Result Value Ref Range    Sodium 141 136 - 145 mmol/L    Potassium 3.6 3.5 - 5.1 mmol/L    Chloride 107 97 - 108 mmol/L    CO2 33 (H) 21 - 32 mmol/L    Anion gap 1 (L) 5 - 15 mmol/L    Glucose 87 65 - 100 mg/dL    BUN 7 6 - 20 MG/DL    Creatinine 0.70 0.55 - 1.02 MG/DL    BUN/Creatinine ratio 10 (L) 12 - 20      GFR est AA >60 >60 ml/min/1.73m2    GFR est non-AA >60 >60 ml/min/1.73m2    Calcium 9.1 8.5 - 10.1 MG/DL    Bilirubin, total 0.7 0.2 - 1.0 MG/DL    ALT (SGPT) 29 12 - 78 U/L    AST (SGOT) 23 15 - 37 U/L    Alk. phosphatase 94 45 - 117 U/L    Protein, total 7.9 6.4 - 8.2 g/dL    Albumin 4.1 3.5 - 5.0 g/dL    Globulin 3.8 2.0 - 4.0 g/dL    A-G Ratio 1.1 1.1 - 2.2     MAGNESIUM    Collection Time: 08/17/19  1:28 PM   Result Value Ref Range    Magnesium 2.4 1.6 - 2.4 mg/dL   TROPONIN I    Collection Time: 08/17/19  1:28 PM   Result Value Ref Range    Troponin-I, Qt. <0.05 <0.05 ng/mL   TROPONIN I    Collection Time: 08/17/19  3:55 PM   Result Value Ref Range    Troponin-I, Qt. <0.05 <0.05 ng/mL       IMAGING RESULTS:    Xr Chest Pa Lat    Result Date: 8/17/2019  Chest PA and lateral History: Chest pain Comparison: None Findings: The lungs are well expanded. No focal consolidation, pleural effusion, or pneumothorax. The cardiomediastinal silhouette is unremarkable. The visualized osseous structures are unremarkable. Impression: No acute cardiopulmonary process. MEDICATIONS GIVEN:  Medications   lisinopril (PRINIVIL, ZESTRIL) tablet 20 mg (has no administration in time range)   aspirin chewable tablet 324 mg (324 mg Oral Given 8/17/19 1552)       IMPRESSION:  1. Chest pain in adult        PLAN:  1. Current Discharge Medication List      START taking these medications    Details   aspirin delayed-release 81 mg tablet Take 1 Tab by mouth daily.   Qty: 15 Tab, Refills: 0         CONTINUE these medications which have NOT CHANGED    Details   atorvastatin (LIPITOR) 10 mg tablet TAKE ONE TABLET BY MOUTH DAILY  Qty: 90 Tab, Refills: 2      omega-3 fatty acids (FISH OIL CONCENTRATE PO) Take  by mouth.      multivitamin (ONE A DAY) tablet Take 1 Tab by mouth daily. lisinopril (PRINIVIL, ZESTRIL) 20 mg tablet Take 1 Tab by mouth daily. Qty: 90 Tab, Refills: 1      timolol (TIMOPTIC) 0.5 % ophthalmic solution 1 Drop two (2) times a day. dorzolamide HCl/PF (DORZOLAMIDE, PF,) 2 % drop Apply  to eye.      varicella-zoster recombinant, PF, (SHINGRIX, PF,) 50 mcg/0.5 mL susr injection 0.5mL by IntraMUSCular route once now and then repeat in 2-6 months  Qty: 0.5 mL, Refills: 1    Associated Diagnoses: Medicare annual wellness visit, subsequent      latanoprost (XALATAN) 0.005 % ophthalmic solution Administer 1 Drop to left eye nightly. RESTASIS 0.05 % ophthalmic emulsion Refills: 6           2. Follow-up Information     Follow up With Specialties Details Why Contact Marvin Uribe MD Cardiology Schedule an appointment as soon as possible for a visit 2-4 days for recheck  [address] [phone]      Wolfgangdo Diana MD Internal Medicine Schedule an appointment as soon as possible for a visit 2-4 days for recheck [address]  [phone]          Return to ED if worse         4:36 PM  Pt has been reexamined. Pt has no new complaints, changes or physical findings. Care plan outlined and precautions discussed. All available results were reviewed with pt. All medications were reviewed with pt. All of pt's questions and concerns were addressed. Pt agrees to F/U as instructed and agrees to return to ED upon further deterioration. Pt is ready to go home. MICHEAL Valdivia      I personally saw and examined the patient. I have reviewed and agree with the MLP's findings, including all diagnostic interpretations, and plans as written.    I was present during the key portions of separately billed procedures.     Laina Betancourt MD

## 2019-08-17 NOTE — ED TRIAGE NOTES
Chest pain last night, it was on the left side and it was squeezing. Took aspirin, took vinegar then tried vibrating massage for the pain. Was sleeping when pain started, afraid to go back to sleep.  Time was 3-4 AM.

## 2019-08-19 LAB
ATRIAL RATE: 64 BPM
CALCULATED P AXIS, ECG09: 61 DEGREES
CALCULATED R AXIS, ECG10: -26 DEGREES
CALCULATED T AXIS, ECG11: 28 DEGREES
DIAGNOSIS, 93000: NORMAL
P-R INTERVAL, ECG05: 188 MS
Q-T INTERVAL, ECG07: 392 MS
QRS DURATION, ECG06: 98 MS
QTC CALCULATION (BEZET), ECG08: 404 MS
VENTRICULAR RATE, ECG03: 64 BPM

## 2019-08-27 ENCOUNTER — OFFICE VISIT (OUTPATIENT)
Dept: CARDIOLOGY CLINIC | Age: 69
End: 2019-08-27

## 2019-08-27 VITALS
HEIGHT: 58 IN | WEIGHT: 116.6 LBS | SYSTOLIC BLOOD PRESSURE: 158 MMHG | DIASTOLIC BLOOD PRESSURE: 84 MMHG | RESPIRATION RATE: 18 BRPM | HEART RATE: 76 BPM | BODY MASS INDEX: 24.48 KG/M2

## 2019-08-27 DIAGNOSIS — I10 ESSENTIAL HYPERTENSION: ICD-10-CM

## 2019-08-27 DIAGNOSIS — R07.9 CHEST PAIN, UNSPECIFIED TYPE: Primary | ICD-10-CM

## 2019-08-27 DIAGNOSIS — E78.5 HYPERLIPIDEMIA, UNSPECIFIED HYPERLIPIDEMIA TYPE: ICD-10-CM

## 2019-08-27 NOTE — PROGRESS NOTES
Visit Vitals  /84 (BP 1 Location: Left arm)   Pulse 76   Resp 18   Ht 4' 10\" (1.473 m)   Wt 116 lb 9.6 oz (52.9 kg)   BMI 24.37 kg/m²

## 2019-08-27 NOTE — LETTER
8/27/19 Patient: Lv Lee YOB: 1950 Date of Visit: 8/27/2019 Rosy Segovia MD 
[id number] [address] [facility] [id number] VIA In Basket Dear Rosy Segovia MD, Thank you for referring Ms. Lv Lee to [facility] for evaluation. My notes for this consultation are attached. If you have questions, please do not hesitate to call me. I look forward to following your patient along with you. Sincerely, Viral Valle MD

## 2019-08-27 NOTE — PROGRESS NOTES
Gabriela David MD    [address] [place], [address] [facility]    Office [phone],QLI [phone]  Pager [phone]    Hellen Wilson is a 68 y.o. female referred for evaluation of chest pain. Consult requested by [facility] ED  Primary care physician:  Hardik Wright MD      Chief complaint:  Hellen Wilson is a 68 y.o. female who complains of   Chief Complaint   Patient presents with    New Patient    Chest Pain (Angina)       Assessment:    Chest Pain  Hypertension  Hyperlipidemia  History of Gama's esophagus  History of osteoporosis  History of sicca syndrome  PADMINI - CPAP      Plan:     Stress ECHO  Lipids per PCP-controlled per patient  Home blood pressure is well controlled. Component of whitecoat hypertension. Patient will follow-up based on her stress echo results. If normal she will follow-up on a as needed basis. Aggressive cardiovascular risk factor modification. Patient understands the plan. All questions were answered to the patient's satisfaction. Medication Side Effects and Warnings were discussed with patient: yes  Patient Labs were reviewed and or requested:  yes  Patient Past Records were reviewed and or requested: yes    I appreciate the opportunity to be involved in Ms. Guthrie. Please see note below for details. Please do not hesitate to contact us with questions or concerns. Gabriela David MD    Cardiac Testing/ Procedures: A. Cardiac Cath/PCI:    B.ECHO/LIAN: 5/2/2018-EF 55 to 65%, mild MR, mild TR, mild NY    C. StressNuclear/Stress ECHO/Stress test:    D.Vascular:    E. EP:    F. Miscellaneous:    History of present illness:    68 yr old  female who is referred for evaluation of chest pain after recent ED visit on 8/17/19. On the day of the ED visit, she had chest pain on the left side which woke her up which radiated to the back. No diaphoresis, dyspnea, dizziness, syncope. She has history of sleep apnea.   No prior history of CAD. Fairly active and walks 2 to 3 miles daily without having difficulty. Past Medical History:   Diagnosis Date    Arthritis     Gama esophagus     Blepharospasm     Cataract     Hx of mammogram 2018    Negative    Hyperlipidemia     Hypertension     Lichen planus bullosus     Neuralgia     Ocular hypertelorism     Osteoporosis 2016    DEXA    Routine Papanicolaou smear 2019    Negative (no hpv)     Sicca syndrome (HCC)     Sleep apnea       Past Surgical History:   Procedure Laterality Date    HX  SECTION      x2    HX TUBAL LIGATION      90s; L/S     Family History   Problem Relation Age of Onset   Panda Rivera Alzheimer Mother          @ 79yo    Heart Disease Father     Diabetes Father     Alzheimer Father     Cancer Brother         Lung ( @ 62yo)   Panda Rivera Cancer Sister         Hodgkins Disease ( at 44yo)   Panda Rivera Other Sister         Non-Hodkins Disease     Breast Problems Maternal Aunt       Social History     Tobacco Use    Smoking status: Never Smoker    Smokeless tobacco: Never Used    Tobacco comment: Never used vapor or e-cigs    Substance Use Topics    Alcohol use: No     Alcohol/week: 5.0 standard drinks     Types: 5 Glasses of wine per week    Drug use: No          Medications before admission:    Current Outpatient Medications   Medication Sig Dispense    TURMERIC PO Take two capsules daily.  Lactobacillus acidophilus (PROBIOTIC PO) Take  by mouth daily.  OTHER Hair, Skin and Nails - one gummy daily.  aspirin delayed-release 81 mg tablet Take 1 Tab by mouth daily. 15 Tab    atorvastatin (LIPITOR) 10 mg tablet TAKE ONE TABLET BY MOUTH DAILY 90 Tab    omega-3 fatty acids (FISH OIL CONCENTRATE PO) Take  by mouth daily.  multivitamin (ONE A DAY) tablet Take 1 Tab by mouth daily.  lisinopril (PRINIVIL, ZESTRIL) 20 mg tablet Take 1 Tab by mouth daily.  90 Tab    timolol (TIMOPTIC) 0.5 % ophthalmic solution 1 Drop two (2) times a day.  dorzolamide HCl/PF (DORZOLAMIDE, PF,) 2 % drop Apply  to eye.  latanoprost (XALATAN) 0.005 % ophthalmic solution Administer 1 Drop to left eye nightly.  RESTASIS 0.05 % ophthalmic emulsion 1 Drop daily.  varicella-zoster recombinant, PF, (SHINGRIX, PF,) 50 mcg/0.5 mL susr injection 0.5mL by IntraMUSCular route once now and then repeat in 2-6 months 0.5 mL     No current facility-administered medications for this visit. Review of Systems:  (bold if positive, if negative)    Gen:  Eyes:  ENT:  CVS:  Pulm:  GI:    :    MS:  Skin:  Psych:  Endo:    Hem:  Renal:    Neuro:        Physical Exam:  Visit Vitals  /84 (BP 1 Location: Left arm)   Pulse 76   Resp 18   Ht 4' 10\" (1.473 m)   Wt 116 lb 9.6 oz (52.9 kg)   BMI 24.37 kg/m²      SBP at home well controlled 110 -130's    Gen: Well-developed, well-nourished, in no acute distress  HEENT:  Pink conjunctivae, hearing intact to voice, moist mucous membranes  Neck: Supple,No JVD, No Carotid Bruit, Thyroid- non tender  Resp: No accessory muscle use, Clear breath sounds, No rales or rhonchi  Card: Regular Rate,Rythm,Normal S1, S2, No murmurs, rubs or gallop. No thrills.    Abd:  Soft, non-tender, non-distended, normoactive bowel sounds are present,   MSK: No cyanosis  Skin: No rashes   Neuro:   moving all four extremities, no focal deficit, follows commands appropriately  Psych:  Good insight, oriented to person, place and time, alert, Nml Affect  LE: No edema  Vascular: Radial pulses 2+ and symmetric        EKG:  Reviewed  Results for orders placed or performed during the hospital encounter of 08/17/19   EKG, 12 LEAD, INITIAL   Result Value Ref Range    Ventricular Rate 64 BPM    Atrial Rate 64 BPM    P-R Interval 188 ms    QRS Duration 98 ms    Q-T Interval 392 ms    QTC Calculation (Bezet) 404 ms    Calculated P Axis 61 degrees    Calculated R Axis -26 degrees    Calculated T Axis 28 degrees    Diagnosis       Normal sinus rhythm  Incomplete right bundle branch block  Borderline ECG  No previous ECGs available  Confirmed by Marissa Falk MD., Anahi ([id number]) on 8/19/2019 11:49:27 AM           Cxray:    LABS:    No results for input(s): CPK, TROIQ in the last 72 hours.     No lab exists for component: CKQMB, CPKMB    Lab Results   Component Value Date/Time    WBC 5.6 08/17/2019 01:28 PM    HGB 14.2 08/17/2019 01:28 PM    HCT 43.2 08/17/2019 01:28 PM    PLATELET 917 97/17/9423 01:28 PM    MCV 93.9 08/17/2019 01:28 PM     Lab Results   Component Value Date/Time    Sodium 141 08/17/2019 01:28 PM    Potassium 3.6 08/17/2019 01:28 PM    Chloride 107 08/17/2019 01:28 PM    CO2 33 (H) 08/17/2019 01:28 PM    Anion gap 1 (L) 08/17/2019 01:28 PM    Glucose 87 08/17/2019 01:28 PM    BUN 7 08/17/2019 01:28 PM    Creatinine 0.70 08/17/2019 01:28 PM    BUN/Creatinine ratio 10 (L) 08/17/2019 01:28 PM    GFR est AA >60 08/17/2019 01:28 PM    GFR est non-AA >60 08/17/2019 01:28 PM    Calcium 9.1 08/17/2019 01:28 PM             Jessica Palomares MD

## 2019-08-29 ENCOUNTER — OFFICE VISIT (OUTPATIENT)
Dept: INTERNAL MEDICINE CLINIC | Age: 69
End: 2019-08-29

## 2019-08-29 DIAGNOSIS — E78.5 DYSLIPIDEMIA, GOAL LDL BELOW 100: Primary | ICD-10-CM

## 2019-08-29 DIAGNOSIS — H49.23 6TH NERVE PALSY, BILATERAL: ICD-10-CM

## 2019-08-29 DIAGNOSIS — R07.89 ATYPICAL CHEST PAIN: ICD-10-CM

## 2019-08-29 DIAGNOSIS — I10 HTN (HYPERTENSION), BENIGN: ICD-10-CM

## 2019-08-29 DIAGNOSIS — L66.1: ICD-10-CM

## 2019-08-29 RX ORDER — LISINOPRIL AND HYDROCHLOROTHIAZIDE 12.5; 2 MG/1; MG/1
1 TABLET ORAL DAILY
Qty: 90 TAB | Refills: 1 | Status: SHIPPED | OUTPATIENT
Start: 2019-08-29 | End: 2019-11-25 | Stop reason: ALTCHOICE

## 2019-08-29 NOTE — PROGRESS NOTES
1. Have you been to the ER, urgent care clinic since your last visit? Hospitalized since your last visit? yes [facility] ED for CP     2. Have you seen or consulted any other health care providers outside of the [address] [place] [address] since your last visit? Include any pap smears or colon screening.  Neuro-dr Ruben Torres, ophthamology

## 2019-08-29 NOTE — PROGRESS NOTES
Chief Complaint   Patient presents with    Other     Gama esophagus    Osteoporosis     SUBJECTIVE: Eda Mckoy is a 68 y.o. female seen for a follow up visit; she has hypertension and hyperlipidemia. Current Outpatient Medications   Medication Sig Dispense Refill    TURMERIC PO Take two capsules daily.  Lactobacillus acidophilus (PROBIOTIC PO) Take  by mouth daily.  OTHER Hair, Skin and Nails - one gummy daily.  aspirin delayed-release 81 mg tablet Take 1 Tab by mouth daily. 15 Tab 0    omega-3 fatty acids (FISH OIL CONCENTRATE PO) Take  by mouth daily.  multivitamin (ONE A DAY) tablet Take 1 Tab by mouth daily.  timolol (TIMOPTIC) 0.5 % ophthalmic solution 1 Drop two (2) times a day.  dorzolamide HCl/PF (DORZOLAMIDE, PF,) 2 % drop Apply  to eye.  latanoprost (XALATAN) 0.005 % ophthalmic solution Administer 1 Drop to left eye nightly.  RESTASIS 0.05 % ophthalmic emulsion 1 Drop daily. 6    atorvastatin (LIPITOR) 10 mg tablet TAKE ONE TABLET BY MOUTH DAILY 90 Tab 2    lisinopril (PRINIVIL, ZESTRIL) 20 mg tablet Take 1 Tab by mouth daily. 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 New concerns: Subjective:  She was seen in the emergency room with chest pain. Workup in ER showed no evidence of ischemia or cardiac enzyme elevation.   She is being evaluated with stress test, stress echo that is scheduled. She has been seeing neuroophthalmology and ophthalmology, was referred to a different ophthalmologist.  Now there is a concern that she maybe has myasthenia gravis because there was a sixth nerve palsy. She is being evaluated by Dr. Gentry Stacy. She had an MRI of the brain that was done at [facility], where __her neurologist____________ works. It showed hyperintense signal flare consistent with microangiopathic changes. There was some chronic small vessel changes. Nothing acute, no mass, hemorrhage, stroke or tumor. It was considered a normal MRI for her age. The question was whether she has a bilateral sixth nerve palsy. This is still unclear. She is still in the midst of a workup for myasthenia from Dr. Carlo Sawyer. She had a prism placed on her glasses and she says that helps a little bit. She has follow up visits with ophthalmology and she also has testing now with cardiology based on her chest pain and her symptoms that she had. She has had no further symptoms. She has a history of Gama's esophagus has an endoscopy planned. ER told her to take an aspirin, told her the aspirin may flare up her Gama's and since there was no evidence of ischemia on her initial testing, I told her I am okay with her not taking aspirin until she has further tests. results reviewed    Getting work up for myasthenia gravis   DEXA Results (most recent):  Results from Hospital Encounter encounter on 09/21/16   DEXA BONE DENSITY STUDY AXIAL    Narrative Bone Mineral Density     Indication:  screening  Age: 74  Sex: Female. Menopause status: postmenopausal.  Hormone replacement therapy: No     Number of falls in the past year:   1   Risk factors for osteoporosis:  None. Current medication for osteoporosis: None. Comparison: None.      Technique: Imaging was performed on the Ivisys      Excluded sites: None      Findings:        Femoral Neck: Left  Bone mineral density (gm/cm2):? 0.673  % of peak bone mass: 65  % for age matched controls:? 80  T-score: -2.6  Z-score: -0.7     Total Hip: Left  Bone mineral density (gm/cm2):  0.870  % of peak bone mass:   86  % for age matched controls:  110  T-score:   -1.1  Z-score:  0.6    Lumbar Spine:  L1-L4  Bone mineral density (gm/cm2):  0.863  % of peak bone mass:  73   % for age matched controls:  80  T-score:  -2.7  Z-score:  -0.5    The T score for the left ultra distal radius is -2.5. The T score for the left  distal third radius is -1.8. Impression Impression: This patient is osteoporotic using the World Health Organization criteria      Recommendations:  Therapy recommendations need to be tailored to each individual patient. Please reconsider testing based on risk factors. Currently, Medicare will only  reimburse for a central DXA examination every two years, unless the patient is  on chronic glucocorticoid therapy. Note: Please note that reliable, valid comparisons cannot be made between  studies which have been performed on machines from different manufacturers. If  clinically warranted, a follow up study performed at this site, on the same  unit, would allow the most sensitive assessment of change in bone mineral  density. . Patient does exercise daily. Family history is positive for osteoporosis. Age at menopause 46. Patient does not smoke. Previous treatment calcium with vitamin D 1200mg daily and was on evista for a short while.     Home BP  120s /80  OBJECTIVE:  Vitals:    08/29/19 1504   BP: 179/90   Pulse: 66   Resp: 18   Temp: 98.5 °F (36.9 °C)   TempSrc: Oral   SpO2: 98%   Weight: 116 lb (52.6 kg)   Height: 4' 10\" (1.473 m)       Vitals:    08/29/19 1504   BP: 179/90   Pulse: 66   Resp: 18   Temp: 98.5 °F (36.9 °C)   TempSrc: Oral   SpO2: 98%   Weight: 116 lb (52.6 kg)   Height: 4' 10\" (1.473 m)   was 120 / 73 at neurologist this morning    Appearance: alert, well appearing, and in no distress, oriented to person, place, and time and normal appearing weight. General exam: CVS exam BP noted to be borderline elevated today in office, S1, S2 normal, no gallop, no murmur, chest clear, no JVD, no HSM, no edema, peripheral vascular exam both carotids normal upstroke without bruits, neurological exam alert, oriented, normal speech, no focal findings or movement disorder noted, normal muscle tone, no tremors, strength 5/5. left temporal tenderness  Lab review: most recent lipid panel reviewed, showing LDL result meets goal.   Lab Results   Component Value Date/Time    Cholesterol, total 157 02/22/2019 09:03 AM    HDL Cholesterol 77 02/22/2019 09:03 AM    LDL, calculated 69 02/22/2019 09:03 AM    VLDL, calculated 11 02/22/2019 09:03 AM    Triglyceride 55 02/22/2019 09:03 AM       ASSESSMENT:  hypertension not controlled  In office better at home, reasonably well controlled, needs further observation. PLAN:  current treatment plan is effective, no change in therapy  lab results and schedule of future lab studies reviewed with patient  repeat labs ordered prior to next appointment. 1. Dyslipidemia, goal LDL below 100  Lab Results   Component Value Date/Time    Cholesterol, total 157 02/22/2019 09:03 AM    HDL Cholesterol 77 02/22/2019 09:03 AM    LDL, calculated 69 02/22/2019 09:03 AM    VLDL, calculated 11 02/22/2019 09:03 AM    Triglyceride 55 02/22/2019 09:03 AM     At goal    2. HTN (hypertension), benign  Add HCTZ 12.5 daily    3. Lichen planus follicularis  Reviewed hair loss    4.  Atypical chest pain  Work up [address]    5. 6th nerve palsy, bilateral  Seeing neuro opth  And  Will see opinion

## 2019-08-30 VITALS
BODY MASS INDEX: 24.35 KG/M2 | HEIGHT: 58 IN | WEIGHT: 116 LBS | HEART RATE: 66 BPM | OXYGEN SATURATION: 98 % | DIASTOLIC BLOOD PRESSURE: 80 MMHG | TEMPERATURE: 98.5 F | SYSTOLIC BLOOD PRESSURE: 160 MMHG | RESPIRATION RATE: 18 BRPM

## 2019-09-16 RX ORDER — STRONTIUM CHLORIDE SR-89 1 MCI/ML
4 INJECTION INTRAVENOUS ONCE
COMMUNITY
End: 2021-03-29 | Stop reason: ALTCHOICE

## 2019-09-16 RX ORDER — LISINOPRIL 20 MG/1
20 TABLET ORAL DAILY
COMMUNITY
End: 2019-11-25 | Stop reason: SDUPTHER

## 2019-09-16 RX ORDER — CLOBETASOL PROPIONATE 0.46 MG/ML
SOLUTION TOPICAL
COMMUNITY
End: 2021-03-29 | Stop reason: ALTCHOICE

## 2019-09-16 NOTE — PERIOP NOTES
[address]  Endoscopy Preprocedure Instructions      1. On the day of your surgery, please report to registration located on the 2nd floor of the  [facility]. yes    2. You must have a responsible adult to drive you to the hospital, stay at the hospital during your procedure and drive you home. If they leave your procedure will not be started (no exceptions). yes    3. Do not have anything to eat or drink (including water, gum, mints, coffee, and juice) after midnight. This does not apply to the medications you were instructed to take by your physician. yesIf you are currently taking Plavix, Coumadin, Aspirin, or other blood-thinning agents, contact your physician for special instructions. not applicable, patient denies taking     4. If you are having a procedure that requires bowel prep: We recommend that you have only clear liquids the day before your procedure and begin your bowel prep by 5:00 pm.  You may continue to drink clear liquids until midnight. If for any reason you are not able to complete your prep please notify your physician immediately. yes    5. Have a list of all current medications, including vitamins, herbal supplements and any other over the counter medications. yes    6. If you wear glasses, contacts, dentures and/or hearing aids, they may be removed prior to procedure, please bring a case to store them in. yes    7. You should understand that if you do not follow these instructions your procedure may be cancelled. If your physical condition changes (I.e. fever, cold or flu) please contact your doctor as soon as possible. yes    8. It is important that you be on time.   If for any reason you are unable to keep your appointment please call [phone] the day of or your physicians office prior to your scheduled procedure

## 2019-09-17 ENCOUNTER — ANESTHESIA EVENT (OUTPATIENT)
Dept: ENDOSCOPY | Age: 69
End: 2019-09-17
Payer: MEDICARE

## 2019-09-17 ENCOUNTER — ANESTHESIA (OUTPATIENT)
Dept: ENDOSCOPY | Age: 69
End: 2019-09-17
Payer: MEDICARE

## 2019-09-17 ENCOUNTER — HOSPITAL ENCOUNTER (OUTPATIENT)
Age: 69
Setting detail: OUTPATIENT SURGERY
Discharge: HOME | End: 2019-09-17
Attending: INTERNAL MEDICINE | Admitting: INTERNAL MEDICINE
Payer: MEDICARE

## 2019-09-17 VITALS
DIASTOLIC BLOOD PRESSURE: 61 MMHG | WEIGHT: 113.98 LBS | SYSTOLIC BLOOD PRESSURE: 157 MMHG | TEMPERATURE: 97.4 F | BODY MASS INDEX: 23.93 KG/M2 | OXYGEN SATURATION: 100 % | HEART RATE: 54 BPM | HEIGHT: 58 IN | RESPIRATION RATE: 14 BRPM

## 2019-09-17 PROCEDURE — 74011250636 HC RX REV CODE- 250/636: Performed by: NURSE ANESTHETIST, CERTIFIED REGISTERED

## 2019-09-17 PROCEDURE — 74011250636 HC RX REV CODE- 250/636: Performed by: INTERNAL MEDICINE

## 2019-09-17 PROCEDURE — 76040000019: Performed by: INTERNAL MEDICINE

## 2019-09-17 PROCEDURE — 77030021593 HC FCPS BIOP ENDOSC BSC -A: Performed by: INTERNAL MEDICINE

## 2019-09-17 PROCEDURE — 88305 TISSUE EXAM BY PATHOLOGIST: CPT

## 2019-09-17 PROCEDURE — 76060000031 HC ANESTHESIA FIRST 0.5 HR: Performed by: INTERNAL MEDICINE

## 2019-09-17 RX ORDER — FLUMAZENIL 0.1 MG/ML
0.2 INJECTION INTRAVENOUS
Status: DISCONTINUED | OUTPATIENT
Start: 2019-09-17 | End: 2019-09-17 | Stop reason: HOSPADM

## 2019-09-17 RX ORDER — SODIUM CHLORIDE 9 MG/ML
50 INJECTION, SOLUTION INTRAVENOUS CONTINUOUS
Status: DISCONTINUED | OUTPATIENT
Start: 2019-09-17 | End: 2019-09-17 | Stop reason: HOSPADM

## 2019-09-17 RX ORDER — PROPOFOL 10 MG/ML
INJECTION, EMULSION INTRAVENOUS
Status: DISCONTINUED | OUTPATIENT
Start: 2019-09-17 | End: 2019-09-17 | Stop reason: HOSPADM

## 2019-09-17 RX ORDER — ATROPINE SULFATE 0.1 MG/ML
0.4 INJECTION INTRAVENOUS
Status: DISCONTINUED | OUTPATIENT
Start: 2019-09-17 | End: 2019-09-17 | Stop reason: HOSPADM

## 2019-09-17 RX ORDER — PROPOFOL 10 MG/ML
INJECTION, EMULSION INTRAVENOUS AS NEEDED
Status: DISCONTINUED | OUTPATIENT
Start: 2019-09-17 | End: 2019-09-17 | Stop reason: HOSPADM

## 2019-09-17 RX ORDER — NALOXONE HYDROCHLORIDE 0.4 MG/ML
0.4 INJECTION, SOLUTION INTRAMUSCULAR; INTRAVENOUS; SUBCUTANEOUS
Status: DISCONTINUED | OUTPATIENT
Start: 2019-09-17 | End: 2019-09-17 | Stop reason: HOSPADM

## 2019-09-17 RX ORDER — MIDAZOLAM HYDROCHLORIDE 1 MG/ML
.25-5 INJECTION, SOLUTION INTRAMUSCULAR; INTRAVENOUS
Status: DISCONTINUED | OUTPATIENT
Start: 2019-09-17 | End: 2019-09-17 | Stop reason: HOSPADM

## 2019-09-17 RX ORDER — EPINEPHRINE 0.1 MG/ML
1 INJECTION INTRACARDIAC; INTRAVENOUS
Status: DISCONTINUED | OUTPATIENT
Start: 2019-09-17 | End: 2019-09-17 | Stop reason: HOSPADM

## 2019-09-17 RX ORDER — DEXTROMETHORPHAN/PSEUDOEPHED 2.5-7.5/.8
1.2 DROPS ORAL
Status: DISCONTINUED | OUTPATIENT
Start: 2019-09-17 | End: 2019-09-17 | Stop reason: HOSPADM

## 2019-09-17 RX ADMIN — PROPOFOL 20 MG: 10 INJECTION, EMULSION INTRAVENOUS at 12:39

## 2019-09-17 RX ADMIN — SODIUM CHLORIDE 50 ML/HR: 900 INJECTION, SOLUTION INTRAVENOUS at 12:31

## 2019-09-17 RX ADMIN — PROPOFOL 140 MCG/KG/MIN: 10 INJECTION, EMULSION INTRAVENOUS at 12:38

## 2019-09-17 RX ADMIN — PROPOFOL 50 MG: 10 INJECTION, EMULSION INTRAVENOUS at 12:38

## 2019-09-17 NOTE — DISCHARGE INSTRUCTIONS
[address] [facility]. Collin Breen M.D.  [phone]                 COLON and EGD DISCHARGE INSTRUCTIONS    2019    Lv Lee  :  1950  [place] Medical Record Number:  [id number]      DISCOMFORT:  Sore throat- throat lozenges or warm salt water gargle  Redness at IV site- apply warm compress to area; if redness or soreness persist- contact your physician  There may be a slight amount of blood passed from the rectum  Gaseous discomfort- walking, belching will help relieve any discomfort  You may not operate a vehicle for 12 hours  You may not engage in an occupation involving machinery or appliances for rest of today  You may not drink alcoholic beverages for at least 12 hours  Avoid making any critical decisions for at least 24 hour  DIET:   High fiber diet. GERD diet: avoid fried and fatty foods. peppermint, chocolate, alcohol, coffee, citrus fruits and juices, tomoato products; avoid lying down for 2 to 3 hours after eating.   - however -  remember your colon is empty and a heavy meal will produce gas. Avoid these foods:  vegetables, fried / greasy foods, carbonated drinks for today     ACTIVITY:  You may  resume your normal daily activities it is recommended that you spend the remainder of the day resting -  avoid any strenuous activity and driving. CALL M.D. ANY SIGN OF:   Increasing pain, nausea, vomiting  Abdominal distension (swelling)  New increased bleeding (oral or rectal)  Fever (chills)  Pain in chest area  Bloody discharge from nose or mouth  Shortness of breath      Follow-up Instructions:   Call Dr. Ginna Marcos if any questions at [phone]. Results of procedure / biopsy in 7 to 10 days, we will call you with these results. Your endoscopy showed a small size hiatal hernia and no change at the bottom of the esophagus, biopsies obtained. Your colonoscopy showed normal mucosa throughout and small internal hemorrhoids. Repeat in 5 years.       Patient Education Hiatal Hernia: Care Instructions  Your Care Instructions  A hiatal hernia occurs when part of the stomach bulges into the chest cavity. A hiatal hernia may allow stomach acid and juices to back up into the esophagus (acid reflux). This can cause a feeling of burning, warmth, heat, or pain behind the breastbone. This feeling may often occur after you eat, soon after you lie down, or when you bend forward, and it may come and go. You also may have a sour taste in your mouth. These symptoms are commonly known as heartburn or reflux. But not all hiatal hernias cause symptoms. Follow-up care is a key part of your treatment and safety. Be sure to make and go to all appointments, and call your doctor if you are having problems. It's also a good idea to know your test results and keep a list of the medicines you take. How can you care for yourself at home? · Take your medicines exactly as prescribed. Call your doctor if you think you are having a problem with your medicine. · Do not take aspirin or other nonsteroidal anti-inflammatory drugs (NSAIDs), such as ibuprofen (Advil, Motrin) or naproxen (Aleve), unless your doctor says it is okay. Ask your doctor what you can take for pain. · Your doctor may recommend over-the-counter medicine. For mild or occasional indigestion, antacids such as Tums, Gaviscon, Maalox, or Mylanta may help. Your doctor also may recommend over-the-counter acid reducers, such as famotidine (Pepcid AC), cimetidine (Tagamet HB), ranitidine (Zantac 75 and Zantac 150), or omeprazole (Prilosec). Read and follow all instructions on the label. If you use these medicines often, talk with your doctor. · Change your eating habits. ? It's best to eat several small meals instead of two or three large meals. ? After you eat, wait 2 to 3 hours before you lie down. Late-night snacks aren't a good idea. ? Chocolate, mint, and alcohol can make heartburn worse.  They relax the valve between the esophagus and the stomach. ? Spicy foods, foods that have a lot of acid (like tomatoes and oranges), and coffee can make heartburn symptoms worse in some people. If your symptoms are worse after you eat a certain food, you may want to stop eating that food to see if your symptoms get better. · Do not smoke or chew tobacco.  · If you get heartburn at night, raise the head of your bed 6 to 8 inches by putting the frame on blocks or placing a foam wedge under the head of your mattress. (Adding extra pillows does not work.)  · Do not wear tight clothing around your middle. · Lose weight if you need to. Losing just 5 to 10 pounds can help. When should you call for help? Call your doctor now or seek immediate medical care if:    · You have new or worse belly pain.     · You are vomiting.    Watch closely for changes in your health, and be sure to contact your doctor if:    · You have new or worse symptoms of indigestion.     · You have trouble or pain swallowing.     · You are losing weight.     · You do not get better as expected. Where can you learn more? Go to http://kristin-rajni.info/. Enter F348 in the search box to learn more about \"Hiatal Hernia: Care Instructions. \"  Current as of: November 7, 2018  Content Version: 12.1  © 5464-9692 [employer], [employer]orporated. Care instructions adapted under license by [employer] (which disclaims liability or warranty for this information). If you have questions about a medical condition or this instruction, always ask your healthcare professional. [place] [address] any warranty or liability for your use of this information.

## 2019-09-17 NOTE — PROCEDURES
Delphine Mosley M.D.  [phone]            2019          Colonoscopy Operative Report  Charu Starr  :  1950  [place] Medical Record Number:  [id number]      Indications:    Personal history of colon polyps (screening only)     :  Drew Cordova MD    Referring Provider: Amna Orozco MD    Sedation:  MAC anesthesia    Pre-Procedural Exam:      Airway: clear,  No airway problems anticipated  Heart: RRR, without gallops or rubs  Lungs: clear bilaterally without wheezes, crackles, or rhonchi  Abdomen: soft, nontender, nondistended, bowel sounds present  Mental Status: awake, alert and oriented to person, place and time     Procedure Details:  After informed consent was obtained with all risks and benefits of procedure explained and preoperative exam completed, the patient was taken to the endoscopy suite and placed in the left lateral decubitus position. Upon sequential sedation as per above, a digital rectal exam was performed. The Olympus videocolonoscope  was inserted in the rectum and carefully advanced to the cecum, which was identified by the ileocecal valve and appendiceal orifice. The quality of preparation was good. The colonoscope was slowly withdrawn with careful inspection and evaluation between folds. Retroflexion in the rectum was performed. Findings:   Terminal Ileum: not intubated  Cecum: normal  Ascending Colon: normal  Transverse Colon: normal  Descending Colon: normal  Sigmoid: normal  Rectum: no mucosal lesion appreciated  Grade 1 internal hemorrhoid(s); Interventions:  none    Specimen Removed:  none    Complications: None. EBL:  None. Impression:  Small internal hemorrhoids, otherwise mucosa within normal    Recommendations:  -Repeat colonoscopy in 5 years.   -High fiber diet.    -Resume normal medication(s). Discharge Disposition:  Home in the company of a  when able to ambulate.     Drew Cordova MD  2019 1:09 PM

## 2019-09-17 NOTE — H&P
Solange Gregory M.D.  [phone]            History and Physical       NAME:  Araseil Corley   :   1950   MRN:   [id number]           Consult Date: 2019 12:32 PM    Chief Complaint:  Gama's esophagus and colon polyp surveillance    History of Present Illness:  Patient is a 68 y.o. who is seen for Gama's surveillance and colon polyp surveillance. Denies any ongoing GI complaints. PMH:  Past Medical History:   Diagnosis Date    Arthritis     Gama esophagus     Blepharospasm     Cataract     Hx of mammogram 2018    Negative    Hyperlipidemia     Hypertension     Lichen planus bullosus     Neuralgia     Ocular hypertelorism     Osteoporosis 2016    DEXA    Routine Papanicolaou smear 2019    Negative (no hpv)     Sicca syndrome (HCC)     Sleep apnea        PSH:  Past Surgical History:   Procedure Laterality Date    HX  SECTION      x2    HX ORTHOPAEDIC Left 1990s    trigger finger surgery    HX TONSILLECTOMY      at age 32    HX TUBAL LIGATION      90s; L/S       Allergies: Allergies   Allergen Reactions    Codeine Nausea and Vomiting    Keflex [Cephalexin] Nausea and Vomiting       Home Medications:  Prior to Admission Medications   Prescriptions Last Dose Informant Patient Reported? Taking? Lactobacillus acidophilus (PROBIOTIC PO)   Yes No   Sig: Take  by mouth daily. Indications: 14 strains   OTHER   Yes No   Sig: Hair, Skin and Nails - one gummy daily. OTHER   Yes Yes   Sig: Take  by mouth daily. Indications: Algea Thang   OTHER   Yes Yes   Sig: Take 680 mg by mouth daily. Indications: Strontium   RESTASIS 0.05 % ophthalmic emulsion   Yes No   Si Drop daily. TURMERIC PO   Yes No   Sig: Take two capsules daily. atorvastatin (LIPITOR) 10 mg tablet   No No   Sig: TAKE ONE TABLET BY MOUTH DAILY   clobetasol (TEMOVATE) 0.05 % external solution   Yes Yes   Sig: Apply  to affected area every seven (7) days. dorzolamide HCl/PF (DORZOLAMIDE, PF,) 2 % drop   Yes No   Sig: Apply  to eye.   latanoprost (XALATAN) 0.005 % ophthalmic solution   Yes No   Sig: Administer 1 Drop to left eye nightly. lisinopril (PRINIVIL, ZESTRIL) 20 mg tablet   Yes No   Sig: Take 20 mg by mouth daily. lisinopril-hydroCHLOROthiazide (PRINZIDE, ZESTORETIC) 20-12.5 mg per tablet Not Taking at Unknown time  No No   Sig: Take 1 Tab by mouth daily. multivit-minerals/folic acid (WOMEN'S MULTIVITAMIN GUMMIES PO)   Yes Yes   Sig: Take  by mouth. Indications: 4 gummies a day with food   multivitamins chew Not Taking at Unknown time  Yes No   Sig: Take 4 Tabs by mouth daily. omega-3 fatty acids (FISH OIL CONCENTRATE PO)   Yes No   Sig: Take  by mouth daily. strontium-89 chloride (METASTRON) 1 mCi/mL soln injection Not Taking at Unknown time  Yes No   Si millicuries by IntraVENous route once. timolol (TIMOPTIC) 0.5 % ophthalmic solution   Yes No   Si Drop two (2) times a day.       Facility-Administered Medications: None       Hospital Medications:  Current Facility-Administered Medications   Medication Dose Route Frequency    0.9% sodium chloride infusion  50 mL/hr IntraVENous CONTINUOUS    midazolam (VERSED) injection 0.25-5 mg  0.25-5 mg IntraVENous Multiple    naloxone (NARCAN) injection 0.4 mg  0.4 mg IntraVENous Multiple    flumazenil (ROMAZICON) 0.1 mg/mL injection 0.2 mg  0.2 mg IntraVENous Multiple    simethicone (MYLICON) 50QC/9.3SB oral drops 80 mg  1.2 mL Oral Multiple    atropine injection 0.4 mg  0.4 mg IntraVENous ONCE PRN    EPINEPHrine (ADRENALIN) 0.1 mg/mL syringe 1 mg  1 mg Endoscopically ONCE PRN       Social History:  Social History     Tobacco Use    Smoking status: Never Smoker    Smokeless tobacco: Never Used   Substance Use Topics    Alcohol use: Not Currently       Family History:  Family History   Problem Relation Age of Onset    Alzheimer Mother          @ 81yo    Heart Disease Father     Diabetes Father     Alzheimer Father     Cancer Brother         Lung ( @ 62yo)   [facility] Cancer Sister         Hodgkins Disease ( at 44yo)   [facility] Other Sister         Non-Hodkins Disease     Breast Problems Maternal Aunt              Review of Systems:      Constitutional: negative fever, negative chills, negative weight loss  Eyes:   negative visual changes  ENT:   negative sore throat, tongue or lip swelling  Respiratory:  negative cough, negative dyspnea  Cards:  negative for chest pain, palpitations, lower extremity edema  GI:   See HPI  :  negative for frequency, dysuria  Integument:  negative for rash and pruritus  Heme:  negative for easy bruising and gum/nose bleeding  Musculoskel: negative for myalgias,  back pain and muscle weakness  Neuro: negative for headaches, dizziness, vertigo  Psych:  negative for feelings of anxiety, depression       Objective:     Patient Vitals for the past 8 hrs:   BP Temp Pulse Resp SpO2 Height Weight   19 1152 171/64 98.2 °F (36.8 °C) (!) 58 16 100 % 4' 10\" (1.473 m) 51.7 kg (113 lb 15.7 oz)     No intake/output data recorded. No intake/output data recorded. EXAM:     NEURO-a&o   HEENT-wnl   LUNGS-clear    COR-regular rate and rhythym     ABD-soft , no tenderness, no rebound, good bs     EXT-no edema     Data Review     No results for input(s): WBC, HGB, HCT, PLT, HGBEXT, HCTEXT, PLTEXT in the last 72 hours. No results for input(s): NA, K, CL, CO2, BUN, CREA, GLU, PHOS, CA in the last 72 hours. No results for input(s): SGOT, GPT, AP, TBIL, TP, ALB, GLOB, GGT, AML, LPSE in the last 72 hours. No lab exists for component: AMYP, HLPSE  No results for input(s): INR, PTP, APTT in the last 72 hours.     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      Signed By: Colten Donaldson MD     9/17/2019  12:32 PM

## 2019-09-17 NOTE — PERIOP NOTES
1241: Anesthesia staff at patient's bedside administering anesthesia and monitoring patients vital signs throughout procedure. See anesthesia note.    5921: Patient tolerated procedure without problems. Abdomen soft and patient arousable and voices no complaints Report received from CRNA, see anesthesia note. Patient transported to endoscopy recovery area.  Endoscope was pre-cleaned at bedside immediately following procedure by CHAD Almeida    0646: Report given to recovery nurse Jeremias Couch RN

## 2019-09-17 NOTE — ROUTINE PROCESS
Adeel Currie  1950  [id number]    Situation:  Verbal report received from: HANNAH Irwin RN  Procedure: Procedure(s):  COLONOSCOPY  ESOPHAGOGASTRODUODENOSCOPY (EGD)  ESOPHAGOGASTRODUODENAL (EGD) BIOPSY    Background:    Preoperative diagnosis: MARTINEZ'S  PERSONAL HX OF POLYPS  Postoperative diagnosis: Hemorrhoids, Martinez's     :  Dr. Linda Moe  Assistant(s): Endoscopy Technician-1: Redd Ochoa  Endoscopy RN-1: Mayelin Mejia RN  Endoscopy RN-2: Brandin Jon RN    Specimens:   ID Type Source Tests Collected by Time Destination   1 : Biopsy Dao Doss MD 9/17/2019 1243 Pathology     H. Pylori  no    Assessment:  Intra-procedure medications     Anesthesia gave intra-procedure sedation and medications, see anesthesia flow sheet yes    Intravenous fluids: NS@ KVO     Vital signs stable     Abdominal assessment: round and soft     Recommendation:  Discharge patient per MD order. Family or Friend   Permission to share finding with family or friend yes    Endoscopy discharge instructions have been reviewed and given to patient and son in law. The patient and son in law verbalized understanding and acceptance of instructions.

## 2019-09-17 NOTE — PROCEDURES
Spenser Blanca M.D.  [phone]           2019                EGD Operative Report  Carine Borjas  :  1950  Leighton Jaimes Medical Record Number:  [id number]      Indication:  Gama's/esophageal ulcer     : Leslye Samuel MD    Referring Provider:  Derek Garcia MD      Anesthesia/Sedation:  MAC anesthesia    Airway assessment: No airway problems anticipated    Pre-Procedural Exam:      Airway: clear, no airway problems anticipated  Heart: RRR, without gallops or rubs  Lungs: clear bilaterally without wheezes, crackles, or rhonchi  Abdomen: soft, nontender, nondistended, bowel sounds present  Mental Status: awake, alert and oriented to person, place and time       Procedure Details     After infomed consent was obtained for the procedure, with all risks and benefits of procedure explained the patient was taken to the endoscopy suite and placed in the left lateral decubitus position. Following sequential administration of sedation as per above, the endoscope was inserted into the mouth and advanced under direct vision to second portion of the duodenum. A careful inspection was made as the gastroscope was withdrawn, including a retroflexed view of the proximal stomach; findings and interventions are described below. Findings:   Esophagus:Irregular Z line with two subcentimeter salmon colored mucosal tongues seen as previous EGD, otherwise no lesions seen. Stomach: Small size hiatal hernia seen, otherwise mucosa within normal.  Duodenum/jejunum: normal    Therapies:  none    Specimens: GE-junction           Complications:   None; patient tolerated the procedure well. EBL:  None. Impression:    Small size hiatal hernia and stable GE-junction    Recommendations:    -Continue acid suppression.  -Await pathology. -GERD diet: avoid fried and fatty foods.  peppermint, chocolate, alcohol, coffee, citrus fruits and juices, tomoato products; avoid lying down for 2 to 3 hours after eating.     Jessica Kim MD

## 2019-09-20 ENCOUNTER — TELEPHONE (OUTPATIENT)
Dept: CARDIOLOGY CLINIC | Age: 69
End: 2019-09-20

## 2019-09-20 NOTE — TELEPHONE ENCOUNTER
Called patient ID verified X2 reviewed below results per Dr Savi Glass.     Normal Stress Echo    Patient verbalized understanding.      ----- Message from Deloise Felty, MD sent at 9/13/2019  6:13 PM EDT -----  Nml stress echo

## 2019-09-27 ENCOUNTER — HOSPITAL ENCOUNTER (OUTPATIENT)
Dept: MAMMOGRAPHY | Age: 69
Discharge: HOME | End: 2019-09-27
Attending: OBSTETRICS & GYNECOLOGY
Payer: MEDICARE

## 2019-09-27 DIAGNOSIS — Z12.31 SCREENING MAMMOGRAM, ENCOUNTER FOR: ICD-10-CM

## 2019-09-27 PROCEDURE — 77067 SCR MAMMO BI INCL CAD: CPT

## 2019-11-12 RX ORDER — LISINOPRIL 20 MG/1
TABLET ORAL
Qty: 90 TAB | Refills: 0 | Status: SHIPPED | OUTPATIENT
Start: 2019-11-12 | End: 2019-11-25

## 2019-11-25 ENCOUNTER — OFFICE VISIT (OUTPATIENT)
Dept: INTERNAL MEDICINE CLINIC | Age: 69
End: 2019-11-25

## 2019-11-25 VITALS
SYSTOLIC BLOOD PRESSURE: 160 MMHG | BODY MASS INDEX: 24.35 KG/M2 | HEART RATE: 67 BPM | DIASTOLIC BLOOD PRESSURE: 80 MMHG | WEIGHT: 116 LBS | RESPIRATION RATE: 18 BRPM | TEMPERATURE: 97.9 F | OXYGEN SATURATION: 99 % | HEIGHT: 58 IN

## 2019-11-25 DIAGNOSIS — I10 HTN (HYPERTENSION), BENIGN: Primary | ICD-10-CM

## 2019-11-25 DIAGNOSIS — Z23 ENCOUNTER FOR IMMUNIZATION: ICD-10-CM

## 2019-11-25 RX ORDER — LATANOPROST 50 UG/ML
1 SOLUTION/ DROPS OPHTHALMIC
COMMUNITY
End: 2021-03-29 | Stop reason: ALTCHOICE

## 2019-11-25 RX ORDER — LISINOPRIL 20 MG/1
20 TABLET ORAL 2 TIMES DAILY
Qty: 180 TAB | Refills: 1 | Status: SHIPPED | OUTPATIENT
Start: 2019-11-25 | End: 2020-02-07

## 2019-11-25 NOTE — PATIENT INSTRUCTIONS
Vaccine Information Statement    Influenza (Flu) Vaccine (Inactivated or Recombinant): What You Need to Know    Many Vaccine Information Statements are available in [place] sor[place] and other languages. See www.immunize.org/vis  Hojas de información sobre vacunas están disponibles en español y en muchos otros idiomas. Visite www.immunize.org/vis    1. Why get vaccinated? Influenza vaccine can prevent influenza (flu). Flu is a contagious disease that spreads around the [facility] every year, usually between October and May. Anyone can get the flu, but it is more dangerous for some people. Infants and young children, people 72years of age and older, pregnant women, and people with certain health conditions or a weakened immune system are at greatest risk of flu complications. Pneumonia, bronchitis, sinus infections and ear infections are examples of flu-related complications. If you have a medical condition, such as heart disease, cancer or diabetes, flu can make it worse. Flu can cause fever and chills, sore throat, muscle aches, fatigue, cough, headache, and runny or stuffy nose. Some people may have vomiting and diarrhea, though this is more common in children than adults. Each year thousands of people in the [facility] die from flu, and many more are hospitalized. Flu vaccine prevents millions of illnesses and flu-related visits to the doctor each year. 2. Influenza vaccines     CDC recommends everyone 10months of age and older get vaccinated every flu season. Children 6 months through 6years of age may need 2 doses during a single flu season. Everyone else needs only 1 dose each flu season. It takes about 2 weeks for protection to develop after vaccination. There are many flu viruses, and they are always changing. Each year a new flu vaccine is made to protect against three or four viruses that are likely to cause disease in the upcoming flu season.  Even when the vaccine doesnt exactly match these viruses, it may still provide some protection. Influenza vaccine does not cause flu. Influenza vaccine may be given at the same time as other vaccines. 3. Talk with your health care provider    Tell your vaccine provider if the person getting the vaccine:   Has had an allergic reaction after a previous dose of influenza vaccine, or has any severe, life-threatening allergies.  Has ever had Guillain-Barré Syndrome (also called GBS). In some cases, your health care provider may decide to postpone influenza vaccination to a future visit. People with minor illnesses, such as a cold, may be vaccinated. People who are moderately or severely ill should usually wait until they recover before getting influenza vaccine. Your health care provider can give you more information. 4. Risks of a reaction     Soreness, redness, and swelling where shot is given, fever, muscle aches, and headache can happen after influenza vaccine.  There may be a very small increased risk of Guillain-Barré Syndrome (GBS) after inactivated influenza vaccine (the flu shot). [employer] children who get the flu shot along with pneumococcal vaccine (PCV13), and/or DTaP vaccine at the same time might be slightly more likely to have a seizure caused by fever. Tell your health care provider if a child who is getting flu vaccine has ever had a seizure. People sometimes faint after medical procedures, including vaccination. Tell your provider if you feel dizzy or have vision changes or ringing in the ears. As with any medicine, there is a very remote chance of a vaccine causing a severe allergic reaction, other serious injury, or death. 5. What if there is a serious problem? An allergic reaction could occur after the vaccinated person leaves the clinic.  If you see signs of a severe allergic reaction (hives, swelling of the face and throat, difficulty breathing, a fast heartbeat, dizziness, or weakness), call 9-1-1 and get the person to the nearest hospital.    For other signs that concern you, call your health care provider. Adverse reactions should be reported to the Vaccine Adverse Event Reporting System (VAERS). Your health care provider will usually file this report, or you can do it yourself. Visit the VAERS website at www.vaers. [facility].gov or call 4-633.303.9724. VAERS is only for reporting reactions, and VAERS staff do not give medical advice. 6. The National Vaccine Injury Compensation Program    The [facility] Vaccine Injury Compensation Program (VICP) is a federal program that was created to compensate people who may have been injured by certain vaccines. Visit the VICP website at www.hrsa.gov/vaccinecompensation or call 9-585.920.1047 to learn about the program and about filing a claim. There is a time limit to file a claim for compensation. 7. How can I learn more?  Ask your health care provider.  Call your local or state health department.  Contact the Centers for Disease Control and Prevention (CDC):  - Call 5-314.582.3324 (1-800-CDC-INFO) or  - Visit CDCs influenza website at www.cdc.gov/flu    Vaccine Information Statement (Interim)  Inactivated Influenza Vaccine   8/15/2019  42 VITOR Olver[employer] Brochure [id number]   Department of Health and Human Services  Centers for Disease Control and Prevention    Office Use Only

## 2019-11-25 NOTE — PROGRESS NOTES
No chief complaint on file. SUBJECTIVE: Kathleen Garcia is a 68 y.o. female seen for a follow up visit; she has hypertension and hyperlipidemia. Current Outpatient Medications   Medication Sig Dispense Refill    latanoprost (XALATAN) 0.005 % ophthalmic solution Administer 1 Drop to both eyes nightly.  lisinopril (PRINIVIL, ZESTRIL) 20 mg tablet TAKE ONE TABLET BY MOUTH DAILY 90 Tab 0    multivit-minerals/folic acid (WOMEN'S MULTIVITAMIN GUMMIES PO) Take  by mouth. Indications: 4 gummies a day with food      clobetasol (TEMOVATE) 0.05 % external solution Apply  to affected area every seven (7) days.  strontium-89 chloride (METASTRON) 1 mCi/mL soln injection 4 millicuries by IntraVENous route once.  OTHER Take  by mouth daily. Indications: Algea Thang      OTHER Take 680 mg by mouth daily. Indications: Strontium      TURMERIC PO Take two capsules daily.  Lactobacillus acidophilus (PROBIOTIC PO) Take  by mouth daily. Indications: 14 strains      OTHER Hair, Skin and Nails - one gummy daily.  atorvastatin (LIPITOR) 10 mg tablet TAKE ONE TABLET BY MOUTH DAILY 90 Tab 2    omega-3 fatty acids (FISH OIL CONCENTRATE PO) Take  by mouth daily.  timolol (TIMOPTIC) 0.5 % ophthalmic solution 1 Drop two (2) times a day.  dorzolamide HCl/PF (DORZOLAMIDE, PF,) 2 % drop Apply  to eye.  RESTASIS 0.05 % ophthalmic emulsion 1 Drop daily.   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 New concerns: Subjective:  She was seen in the emergency room with chest pain. Workup in ER showed no evidence of ischemia or cardiac enzyme elevation. She is being evaluated with stress test, stress echo that is scheduled. She has been seeing neuroophthalmology and ophthalmology, was referred to a different ophthalmologist.  Now there is a concern that she maybe has myasthenia gravis because there was a sixth nerve palsy. She is being evaluated by Dr. Terrence Martinez. She had an MRI of the brain that was done at [facility], where __her neurologist____________ works. It showed hyperintense signal flare consistent with microangiopathic changes. There was some chronic small vessel changes. Nothing acute, no mass, hemorrhage, stroke or tumor. It was considered a normal MRI for her age. The question was whether she has a bilateral sixth nerve palsy. This is still unclear. She has had no further symptoms. She has a history of Gama's esophagus has an endoscopy planned. ER told her to take an aspirin, told her the aspirin may flare up her Gama's and since there was no evidence of ischemia on her initial testing, I told her I am okay with her not taking aspirin until she has further tests. results reviewed  Home BP elevated very anxious  Did not tolerate the HCTZ  Getting work up for myasthenia gravis   DEXA Results (most recent):  Results from Hospital Encounter encounter on 09/21/16   DEXA BONE DENSITY STUDY AXIAL    Narrative Bone Mineral Density     Indication:  screening  Age: 74  Sex: Female. Menopause status: postmenopausal.  Hormone replacement therapy: No     Number of falls in the past year:   1   Risk factors for osteoporosis:  None. Current medication for osteoporosis: None. Comparison: None.      Technique: Imaging was performed on the tadoÂ°      Excluded sites: None Findings:        Femoral Neck:  Left  Bone mineral density (gm/cm2):? 0.673  % of peak bone mass: 65  % for age matched controls:? 80  T-score: -2.6  Z-score: -0.7     Total Hip: Left  Bone mineral density (gm/cm2):  0.870  % of peak bone mass:   86  % for age matched controls:  110  T-score:   -1.1  Z-score:  0.6    Lumbar Spine:  L1-L4  Bone mineral density (gm/cm2):  0.863  % of peak bone mass:  73   % for age matched controls:  80  T-score:  -2.7  Z-score:  -0.5    The T score for the left ultra distal radius is -2.5. The T score for the left  distal third radius is -1.8. Impression Impression: This patient is osteoporotic using the World Health Organization criteria      Recommendations:  Therapy recommendations need to be tailored to each individual patient. Please reconsider testing based on risk factors. Currently, Medicare will only  reimburse for a central DXA examination every two years, unless the patient is  on chronic glucocorticoid therapy. Note: Please note that reliable, valid comparisons cannot be made between  studies which have been performed on machines from different manufacturers. If  clinically warranted, a follow up study performed at this site, on the same  unit, would allow the most sensitive assessment of change in bone mineral  density. . Patient does exercise daily. Family history is positive for osteoporosis. Age at menopause 46. Patient does not smoke. Previous treatment calcium with vitamin D 1200mg daily and was on evista for a short while.     Home BP  150s /80 to 90  OBJECTIVE:  Vitals:    11/25/19 1535   BP: 151/77   Pulse: 67   Resp: 18   Temp: 97.9 °F (36.6 °C)   TempSrc: Oral   SpO2: 99%   Weight: 116 lb (52.6 kg)   Height: 4' 10\" (1.473 m)       Vitals:    11/25/19 1535   BP: 151/77   Pulse: 67   Resp: 18   Temp: 97.9 °F (36.6 °C)   TempSrc: Oral   SpO2: 99%   Weight: 116 lb (52.6 kg)   Height: 4' 10\" (1.473 m)   was 120 / 73 at neurologist this morning    Appearance: alert, well appearing, and in no distress, oriented to person, place, and time and normal appearing weight. General exam: CVS exam BP noted to be borderline elevated today in office, S1, S2 normal, no gallop, no murmur, chest clear, no JVD, no HSM, no edema, peripheral vascular exam both carotids normal upstroke without bruits, neurological exam alert, oriented, normal speech, no focal findings or movement disorder noted, normal muscle tone, no tremors, strength 5/5. left temporal tenderness  Lab review: most recent lipid panel reviewed, showing LDL result meets goal.   Lab Results   Component Value Date/Time    Cholesterol, total 157 02/22/2019 09:03 AM    HDL Cholesterol 77 02/22/2019 09:03 AM    LDL, calculated 69 02/22/2019 09:03 AM    VLDL, calculated 11 02/22/2019 09:03 AM    Triglyceride 55 02/22/2019 09:03 AM       ASSESSMENT:  hypertension not controlled  In office and home     PLAN:  current treatment plan is effective, no change in therapy  lab results and schedule of future lab studies reviewed with patient  repeat labs ordered prior to next appointment. Diagnoses and all orders for this visit:    1. HTN (hypertension), benign    2. Encounter for immunization  -     INFLUENZA VACCINE INACTIVATED (IIV), SUBUNIT, ADJUVANTED, IM    Other orders  -     lisinopril (PRINIVIL, ZESTRIL) 20 mg tablet; Take 1 Tab by mouth two (2) times a day.       Try bid ace as tolerated  Yoga and relaxation advised

## 2019-11-25 NOTE — PROGRESS NOTES
1. Have you been to the ER, urgent care clinic since your last visit? Hospitalized since your last visit? No    2. Have you seen or consulted any other health care providers outside of the [address] [place] [address] since your last visit? Include any pap smears or colon screening. Yes.   Dr ROMERO for colonoscopy, cardiology-[facility]

## 2019-12-30 ENCOUNTER — OFFICE VISIT (OUTPATIENT)
Dept: INTERNAL MEDICINE CLINIC | Age: 69
End: 2019-12-30

## 2019-12-30 VITALS
HEIGHT: 58 IN | SYSTOLIC BLOOD PRESSURE: 150 MMHG | OXYGEN SATURATION: 99 % | HEART RATE: 54 BPM | WEIGHT: 115 LBS | DIASTOLIC BLOOD PRESSURE: 80 MMHG | BODY MASS INDEX: 24.14 KG/M2 | TEMPERATURE: 97.7 F | RESPIRATION RATE: 18 BRPM

## 2019-12-30 DIAGNOSIS — H40.052 OCULAR HYPERTENSION OF LEFT EYE: ICD-10-CM

## 2019-12-30 DIAGNOSIS — K22.70 BARRETT'S ESOPHAGUS WITHOUT DYSPLASIA: Primary | ICD-10-CM

## 2019-12-30 DIAGNOSIS — Z01.810 PREOP CARDIOVASCULAR EXAM: ICD-10-CM

## 2019-12-30 DIAGNOSIS — I10 HTN (HYPERTENSION), BENIGN: ICD-10-CM

## 2019-12-30 NOTE — PROGRESS NOTES
1. Have you been to the ER, urgent care clinic since your last visit? Hospitalized since your last visit? No    2. Have you seen or consulted any other health care providers outside of the [address] [place] [address] since your last visit? Include any pap smears or colon screening. Yes.   ophthamology

## 2019-12-30 NOTE — PROGRESS NOTES
Shelley Cazares was referred for evaluation by:Dr. Cristian Yeung for Pre- Op Evaluation. Please see encounter details and orders for consultative summary. Type of surgery : catarct  Surgery site : [place]  Anesthesia type: mac  Date of procedure:  1/7/21    Allergies: Allergies   Allergen Reactions    Codeine Nausea and Vomiting    Keflex [Cephalexin] Nausea and Vomiting    Thiazides Unable to Obtain     Latex allergy: no  Prior reactions to anesthesia:  None    Functional status:  she is able to ambulate up 1 flights of step withno shortness of breath, chest pain  Prior cardiac evaluation:   yes    Current Outpatient Medications   Medication Sig    latanoprost (XALATAN) 0.005 % ophthalmic solution Administer 1 Drop to both eyes nightly.  lisinopril (PRINIVIL, ZESTRIL) 20 mg tablet Take 1 Tab by mouth two (2) times a day.  clobetasol (TEMOVATE) 0.05 % external solution Apply  to affected area every seven (7) days.  strontium-89 chloride (METASTRON) 1 mCi/mL soln injection 4 millicuries by IntraVENous route once.  OTHER Take  by mouth daily. Indications: Algea Thang    OTHER Take 680 mg by mouth daily. Indications: Strontium    TURMERIC PO Take two capsules daily.  Lactobacillus acidophilus (PROBIOTIC PO) Take  by mouth daily. Indications: 14 strains    OTHER Hair, Skin and Nails - one gummy daily.  atorvastatin (LIPITOR) 10 mg tablet TAKE ONE TABLET BY MOUTH DAILY    omega-3 fatty acids (FISH OIL CONCENTRATE PO) Take  by mouth daily.  timolol (TIMOPTIC) 0.5 % ophthalmic solution 1 Drop two (2) times a day.  dorzolamide HCl/PF (DORZOLAMIDE, PF,) 2 % drop Apply  to eye.  RESTASIS 0.05 % ophthalmic emulsion 1 Drop daily. No current facility-administered medications for this visit.       Past Medical History:   Diagnosis Date    Arthritis     Gama esophagus     Blepharospasm     Cataract     Hx of mammogram 09/27/2019    Negative    Hyperlipidemia     Hypertension     Lichen planus bullosus     Neuralgia     Ocular hypertelorism     Osteoporosis 09/21/2016    DEXA    Routine Papanicolaou smear 05/24/2019    Negative (no hpv)     Sicca syndrome (HCC)     Sleep apnea      Past Surgical History:   Procedure Laterality Date    COLONOSCOPY N/A 9/17/2019    COLONOSCOPY performed by Ly Maravilla MD at [address]  [facility]      x2    HX ORTHOPAEDIC Left 1990s    trigger finger surgery    HX TONSILLECTOMY      at age 32    HX [facility]; L/S     Social History     Tobacco Use    Smoking status: Never Smoker    Smokeless tobacco: Never Used   Substance Use Topics    Alcohol use: Not Currently    Drug use: No     Home  / 80 to 150 /80  Blood pressure 174/85, pulse (!) 54, temperature 97.7 °F (36.5 °C), temperature source Oral, resp. rate 18, height 4' 10\" (1.473 m), weight 115 lb (52.2 kg), SpO2 99 %. Vitals:    12/30/19 1318 12/30/19 1342   BP: 174/85 150/80   Pulse: (!) 54    Resp: 18    Temp: 97.7 °F (36.5 °C)    TempSrc: Oral    SpO2: 99%    Weight: 115 lb (52.2 kg)    Height: 4' 10\" (1.473 m)      no apparent distress  Anxious  S1 and S2 normal, no murmurs, clicks, gallops or rubs. Regular rate and rhythm. Chest is clear; no wheezes or rales. No edema or JVD. The abdomen is soft without tenderness, guarding, mass, rebound or organomegaly. Bowel sounds are normal. No CVA tenderness or inguinal adenopathy noted. Neuro intact        Neuro normal      1. 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 Ocular hypertension of left eye  beimng eval    3. HTN (hypertension), benign  Stable home readings    4.  Preop cardiovascular exam  Cleared for the planned surgical procedure and anesthesia based on todays findings and exam

## 2020-02-24 ENCOUNTER — OFFICE VISIT (OUTPATIENT)
Dept: INTERNAL MEDICINE CLINIC | Age: 70
End: 2020-02-24

## 2020-02-24 ENCOUNTER — HOSPITAL ENCOUNTER (OUTPATIENT)
Dept: LAB | Age: 70
Discharge: HOME | End: 2020-02-24

## 2020-02-24 VITALS
SYSTOLIC BLOOD PRESSURE: 191 MMHG | HEIGHT: 58 IN | WEIGHT: 116 LBS | RESPIRATION RATE: 18 BRPM | OXYGEN SATURATION: 99 % | TEMPERATURE: 96.9 F | BODY MASS INDEX: 24.35 KG/M2 | DIASTOLIC BLOOD PRESSURE: 87 MMHG | HEART RATE: 58 BPM

## 2020-02-24 DIAGNOSIS — L65.9 HAIR LOSS DISORDER: ICD-10-CM

## 2020-02-24 DIAGNOSIS — M81.0 AGE-RELATED OSTEOPOROSIS WITHOUT CURRENT PATHOLOGICAL FRACTURE: ICD-10-CM

## 2020-02-24 DIAGNOSIS — I10 HTN (HYPERTENSION), BENIGN: ICD-10-CM

## 2020-02-24 DIAGNOSIS — I10 HTN (HYPERTENSION), BENIGN: Primary | ICD-10-CM

## 2020-02-24 DIAGNOSIS — R07.9 CHEST PAIN AT REST: ICD-10-CM

## 2020-02-24 LAB
ALBUMIN SERPL-MCNC: 4.1 G/DL (ref 3.5–5)
ALBUMIN/GLOB SERPL: 1.2 {RATIO} (ref 1.1–2.2)
ALP SERPL-CCNC: 72 U/L (ref 45–117)
ALT SERPL-CCNC: 39 U/L (ref 12–78)
ANION GAP SERPL CALC-SCNC: 4 MMOL/L (ref 5–15)
AST SERPL-CCNC: 22 U/L (ref 15–37)
BASOPHILS # BLD: 0 K/UL (ref 0–0.1)
BASOPHILS NFR BLD: 1 % (ref 0–1)
BILIRUB SERPL-MCNC: 0.8 MG/DL (ref 0.2–1)
BUN SERPL-MCNC: 12 MG/DL (ref 6–20)
BUN/CREAT SERPL: 19 (ref 12–20)
CALCIUM SERPL-MCNC: 9.8 MG/DL (ref 8.5–10.1)
CHLORIDE SERPL-SCNC: 106 MMOL/L (ref 97–108)
CHOLEST SERPL-MCNC: 193 MG/DL
CO2 SERPL-SCNC: 30 MMOL/L (ref 21–32)
COMMENT, HOLDF: NORMAL
CREAT SERPL-MCNC: 0.62 MG/DL (ref 0.55–1.02)
DIFFERENTIAL METHOD BLD: NORMAL
EOSINOPHIL # BLD: 0.1 K/UL (ref 0–0.4)
EOSINOPHIL NFR BLD: 2 % (ref 0–7)
ERYTHROCYTE [DISTWIDTH] IN BLOOD BY AUTOMATED COUNT: 13 % (ref 11.5–14.5)
GLOBULIN SER CALC-MCNC: 3.3 G/DL (ref 2–4)
GLUCOSE SERPL-MCNC: 97 MG/DL (ref 65–100)
HCT VFR BLD AUTO: 43.5 % (ref 35–47)
HDLC SERPL-MCNC: 99 MG/DL
HDLC SERPL: 1.9 {RATIO} (ref 0–5)
HGB BLD-MCNC: 13.7 G/DL (ref 11.5–16)
IMM GRANULOCYTES # BLD AUTO: 0 K/UL (ref 0–0.04)
IMM GRANULOCYTES NFR BLD AUTO: 0 % (ref 0–0.5)
LDLC SERPL CALC-MCNC: 82 MG/DL (ref 0–100)
LIPID PROFILE,FLP: NORMAL
LYMPHOCYTES # BLD: 1.2 K/UL (ref 0.8–3.5)
LYMPHOCYTES NFR BLD: 26 % (ref 12–49)
MCH RBC QN AUTO: 30.6 PG (ref 26–34)
MCHC RBC AUTO-ENTMCNC: 31.5 G/DL (ref 30–36.5)
MCV RBC AUTO: 97.1 FL (ref 80–99)
MONOCYTES # BLD: 0.4 K/UL (ref 0–1)
MONOCYTES NFR BLD: 9 % (ref 5–13)
NEUTS SEG # BLD: 2.8 K/UL (ref 1.8–8)
NEUTS SEG NFR BLD: 62 % (ref 32–75)
NRBC # BLD: 0 K/UL (ref 0–0.01)
NRBC BLD-RTO: 0 PER 100 WBC
PLATELET # BLD AUTO: 176 K/UL (ref 150–400)
PMV BLD AUTO: 10.3 FL (ref 8.9–12.9)
POTASSIUM SERPL-SCNC: 4.6 MMOL/L (ref 3.5–5.1)
PROT SERPL-MCNC: 7.4 G/DL (ref 6.4–8.2)
RBC # BLD AUTO: 4.48 M/UL (ref 3.8–5.2)
SAMPLES BEING HELD,HOLD: NORMAL
SODIUM SERPL-SCNC: 140 MMOL/L (ref 136–145)
T4 FREE SERPL-MCNC: 0.9 NG/DL (ref 0.8–1.5)
TRIGL SERPL-MCNC: 60 MG/DL (ref ?–150)
TSH SERPL DL<=0.05 MIU/L-ACNC: 2.62 UIU/ML (ref 0.36–3.74)
VLDLC SERPL CALC-MCNC: 12 MG/DL
WBC # BLD AUTO: 4.5 K/UL (ref 3.6–11)

## 2020-02-24 RX ORDER — AMLODIPINE BESYLATE 5 MG/1
5 TABLET ORAL
Qty: 90 TAB | Refills: 1 | Status: SHIPPED | OUTPATIENT
Start: 2020-02-24 | End: 2020-08-18

## 2020-02-24 RX ORDER — GUAIFENESIN 100 MG/5ML
81 LIQUID (ML) ORAL DAILY
COMMUNITY
End: 2021-03-29 | Stop reason: ALTCHOICE

## 2020-02-24 RX ORDER — LISINOPRIL 40 MG/1
40 TABLET ORAL DAILY
Qty: 90 TAB | Refills: 1 | Status: SHIPPED | OUTPATIENT
Start: 2020-02-24 | End: 2020-08-18

## 2020-02-24 RX ORDER — PREDNISOLONE ACETATE 10 MG/ML
1 SUSPENSION/ DROPS OPHTHALMIC 4 TIMES DAILY
COMMUNITY
End: 2021-03-29 | Stop reason: ALTCHOICE

## 2020-02-24 NOTE — PROGRESS NOTES
1. Have you been to the ER, urgent care clinic since your last visit? Hospitalized since your last visit? No    2. Have you seen or consulted any other health care providers outside of the [address] [place] [address] since your last visit? Include any pap smears or colon screening.  ophthamology-dr prieto

## 2020-02-24 NOTE — PROGRESS NOTES
Subjective:  Ms. Savanah Neumann is a 68year old female. She was scheduled for Medicare wellness. She really is here mostly because of onset of elevated blood pressure readings. She said she had bilateral eye surgery that was just six weeks ago. She still has some photophobia. Just a lot of stress in her life, including a son who is having financial difficulty. She has been having atypical sharp chest pains left upper chest wall intermittently, sometimes deeper. She says she feels heart palpitations rarely. Generally sleeps okay. Appetite is far. Nervous, worried. Eye problems are constant and having chronic issues. She brings in elevated blood pressure readings that have been quite high lately. She is taking 40 mg of Lisinopril. At first she was taking 20 b.i.d. and then she took 20, two every morning, because it seems to be highest in the morning. No significant caffeine or alcohol. She has never had end organ damage related to hypertension. She has never had cardiac problems. Physical Examination:  Her blood pressure was noted, multiple readings. She had a regular rhythm with S4.  Lungs clear. She had no chest wall tenderness. EKG was normal with a rate of 58. Impression/Plan:  She has atypical chest pain. I do not think it is cardiac, I think it is stress related. We talk about stress reduction, walking, keeping caffeine intake low. Add Amlodipine 5 mg every evening, continue 40 mg of Lisinopril in the morning. Follow up in four to six weeks. Complete labs now, follow up on her lipids.     Patient Active Problem List    Diagnosis    HTN (hypertension), benign    Lichen planus follicularis    Ocular hypertension of left eye     Both        Age-related osteoporosis without current pathological fracture    Blepharospasm     botox  Seeing [place]        Osteoporosis     DEXA Results (most recent):    Results from Hospital Encounter encounter on 09/21/16   DEXA BONE DENSITY STUDY AXIAL Narrative Bone Mineral Density    Indication:  screening  Age: 74  Sex: Female. Menopause status: postmenopausal.  Hormone replacement therapy: No     Number of falls in the past year:   1   Risk factors for osteoporosis:  None. Current medication for osteoporosis: None. Comparison: None. Technique: Imaging was performed on the [employer]      Excluded sites: None     Findings:      Femoral Neck:  Left  Bone mineral density (gm/cm2):? 0.673  % of peak bone mass: 65  % for age matched controls:? 80  T-score: -2.6  Z-score: -0.7    Total Hip: Left  Bone mineral density (gm/cm2):  0.870  % of peak bone mass:   86  % for age matched controls:  110  T-score:   -1.1  Z-score:  0.6    Lumbar Spine:  L1-L4  Bone mineral density (gm/cm2):  0.863  % of peak bone mass:  73   % for age matched controls:  80  T-score:  -2.7  Z-score:  -0.5    The T score for the left ultra distal radius is -2.5. The T score for the left  distal third radius is -1.8. Impression Impression: This patient is osteoporotic using the World Health Organization criteria      Recommendations:  Therapy recommendations need to be tailored to each individual patient. Please reconsider testing based on risk factors. Currently, Medicare will only  reimburse for a central DXA examination every two years, unless the patient is  on chronic glucocorticoid therapy. Note: Please note that reliable, valid comparisons cannot be made between  studies which have been performed on machines from different manufacturers. If  clinically warranted, a follow up study performed at this site, on the same  unit, would allow the most sensitive assessment of change in bone mineral  density. Tried evista leg cramps        Increased serum lipids    Gama esophagus     May 2014         1. HTN (hypertension), benign  Seems accelerated  - AMB POC EKG ROUTINE W/ 12 LEADS, INTER & REP  - CBC WITH AUTOMATED DIFF; Future  - LIPID PANEL;  Future  - METABOLIC PANEL, COMPREHENSIVE; Future    2. Chest pain at rest  Er for typical issues  - AMB POC EKG ROUTINE W/ 12 LEADS, INTER & REP    3. Age-related osteoporosis without current pathological fracture      4. Hair loss disorder  Repeat see 6 weeks    Prolonged visit with greater than 50% of time of more than 25 minute visit spent counseling patient and family and formulation of care    - TSH 3RD GENERATION; Future  - T4, FREE; Future  - T3 TOTAL;  Future

## 2020-02-25 LAB — T3 SERPL-MCNC: 119 NG/DL (ref 71–180)

## 2020-03-23 ENCOUNTER — OFFICE VISIT (OUTPATIENT)
Dept: INTERNAL MEDICINE CLINIC | Age: 70
End: 2020-03-23

## 2020-03-23 DIAGNOSIS — R07.89 ATYPICAL CHEST PAIN: ICD-10-CM

## 2020-03-23 DIAGNOSIS — I10 HTN (HYPERTENSION), BENIGN: Primary | ICD-10-CM

## 2020-03-23 DIAGNOSIS — F41.9 ANXIETY: ICD-10-CM

## 2020-03-23 NOTE — PROGRESS NOTES
THIS IS A VIRTUAL VISIT USING [employer] SOFTWARE    Subjective:  Ms. Dianne Smith is a 68year old female. She really is here mostly because of onset of elevated blood pressure readings. She has been having atypical sharp chest pains left upper chest wall intermittently, sometimes deeper. She says she feels heart palpitations rarely. Generally sleeps okay. Appetite is far. Nervous, worried. Eye problems are constant and having chronic issues. She brings in elevated blood pressure readings that have been quite high lately. She is taking 40 mg of Lisinopril. At first she was taking 20 b.i.d. and then she took 20, two every morning, because it seems to be highest in the morning. No significant caffeine or alcohol. She has never had end organ damage related to hypertension. She has never had cardiac problems. Her atypical pain gone   Home BP better with 119/70 to high of 157/91 checking too frequently   EKG was normal with a rate of 58. Alert no apparent distress  Lab Results   Component Value Date/Time    Cholesterol, total 193 02/24/2020 12:18 PM    HDL Cholesterol 99 02/24/2020 12:18 PM    LDL, calculated 82 02/24/2020 12:18 PM    VLDL, calculated 12 02/24/2020 12:18 PM    Triglyceride 60 02/24/2020 12:18 PM    CHOL/HDL Ratio 1.9 02/24/2020 12:18 PM     Lab Results   Component Value Date/Time    GFR est AA >60 02/24/2020 12:18 PM    GFR est non-AA >60 02/24/2020 12:18 PM    Creatinine 0.62 02/24/2020 12:18 PM    BUN 12 02/24/2020 12:18 PM    Sodium 140 02/24/2020 12:18 PM    Potassium 4.6 02/24/2020 12:18 PM    Chloride 106 02/24/2020 12:18 PM    CO2 30 02/24/2020 12:18 PM         Impression/Plan: . We talk about stress reduction, walking, keeping caffeine intake low. Add Amlodipine 5 mg every evening, continue 40 mg of Lisinopril in the morning.      Patient Active Problem List    Diagnosis    HTN (hypertension), benign    Lichen planus follicularis    Ocular hypertension of left eye     Both        Age-related osteoporosis without current pathological fracture    Blepharospasm     botox  Seeing [place]        Osteoporosis     DEXA Results (most recent):    Results from Hospital Encounter encounter on 09/21/16   DEXA BONE DENSITY STUDY AXIAL   Narrative Bone Mineral Density    Indication:  screening  Age: 74  Sex: Female. Menopause status: postmenopausal.  Hormone replacement therapy: No     Number of falls in the past year:   1   Risk factors for osteoporosis:  None. Current medication for osteoporosis: None. Comparison: None. Technique: Imaging was performed on the [employer]      Excluded sites: None     Findings:      Femoral Neck:  Left  Bone mineral density (gm/cm2):? 0.673  % of peak bone mass: 65  % for age matched controls:? 80  T-score: -2.6  Z-score: -0.7    Total Hip: Left  Bone mineral density (gm/cm2):  0.870  % of peak bone mass:   86  % for age matched controls:  110  T-score:   -1.1  Z-score:  0.6    Lumbar Spine:  L1-L4  Bone mineral density (gm/cm2):  0.863  % of peak bone mass:  73   % for age matched controls:  80  T-score:  -2.7  Z-score:  -0.5    The T score for the left ultra distal radius is -2.5. The T score for the left  distal third radius is -1.8. Impression Impression: This patient is osteoporotic using the World Health Organization criteria      Recommendations:  Therapy recommendations need to be tailored to each individual patient. Please reconsider testing based on risk factors. Currently, Medicare will only  reimburse for a central DXA examination every two years, unless the patient is  on chronic glucocorticoid therapy. Note: Please note that reliable, valid comparisons cannot be made between  studies which have been performed on machines from different manufacturers. If  clinically warranted, a follow up study performed at this site, on the same  unit, would allow the most sensitive assessment of change in bone mineral  density.         Tried evista leg cramps        Increased serum lipids    Gama esophagus     May 2014           1. HTN (hypertension), benign  Better now    2. Atypical chest pain  resolved    3.  Anxiety  Under mod control

## 2020-05-09 RX ORDER — ATORVASTATIN CALCIUM 10 MG/1
TABLET, FILM COATED ORAL
Qty: 90 TAB | Refills: 1 | Status: SHIPPED | OUTPATIENT
Start: 2020-05-09 | End: 2020-11-08

## 2020-08-18 RX ORDER — AMLODIPINE BESYLATE 5 MG/1
TABLET ORAL
Qty: 90 TAB | Refills: 0 | Status: SHIPPED | OUTPATIENT
Start: 2020-08-18 | End: 2020-11-16 | Stop reason: SDUPTHER

## 2020-08-18 RX ORDER — LISINOPRIL 40 MG/1
TABLET ORAL
Qty: 90 TAB | Refills: 0 | Status: SHIPPED | OUTPATIENT
Start: 2020-08-18 | End: 2020-11-16 | Stop reason: SDUPTHER

## 2020-11-08 RX ORDER — ATORVASTATIN CALCIUM 10 MG/1
TABLET, FILM COATED ORAL
Qty: 90 TAB | Refills: 0 | Status: SHIPPED | OUTPATIENT
Start: 2020-11-08 | End: 2021-02-07

## 2020-11-16 ENCOUNTER — VIRTUAL VISIT (OUTPATIENT)
Dept: INTERNAL MEDICINE CLINIC | Age: 70
End: 2020-11-16
Payer: MEDICARE

## 2020-11-16 DIAGNOSIS — I10 HTN (HYPERTENSION), BENIGN: ICD-10-CM

## 2020-11-16 DIAGNOSIS — Z76.0 REFILL CLINIC MEDICATION MANAGEMENT PATIENT: ICD-10-CM

## 2020-11-16 DIAGNOSIS — Z00.00 MEDICARE ANNUAL WELLNESS VISIT, SUBSEQUENT: Primary | ICD-10-CM

## 2020-11-16 PROCEDURE — G8756 NO BP MEASURE DOC: HCPCS | Performed by: INTERNAL MEDICINE

## 2020-11-16 PROCEDURE — G8510 SCR DEP NEG, NO PLAN REQD: HCPCS | Performed by: INTERNAL MEDICINE

## 2020-11-16 PROCEDURE — G0463 HOSPITAL OUTPT CLINIC VISIT: HCPCS | Performed by: INTERNAL MEDICINE

## 2020-11-16 PROCEDURE — G8420 CALC BMI NORM PARAMETERS: HCPCS | Performed by: INTERNAL MEDICINE

## 2020-11-16 PROCEDURE — 3017F COLORECTAL CA SCREEN DOC REV: CPT | Performed by: INTERNAL MEDICINE

## 2020-11-16 PROCEDURE — 99213 OFFICE O/P EST LOW 20 MIN: CPT | Performed by: INTERNAL MEDICINE

## 2020-11-16 PROCEDURE — G8536 NO DOC ELDER MAL SCRN: HCPCS | Performed by: INTERNAL MEDICINE

## 2020-11-16 PROCEDURE — 1090F PRES/ABSN URINE INCON ASSESS: CPT | Performed by: INTERNAL MEDICINE

## 2020-11-16 PROCEDURE — G0439 PPPS, SUBSEQ VISIT: HCPCS | Performed by: INTERNAL MEDICINE

## 2020-11-16 PROCEDURE — G9899 SCRN MAM PERF RSLTS DOC: HCPCS | Performed by: INTERNAL MEDICINE

## 2020-11-16 PROCEDURE — G8427 DOCREV CUR MEDS BY ELIG CLIN: HCPCS | Performed by: INTERNAL MEDICINE

## 2020-11-16 PROCEDURE — 1101F PT FALLS ASSESS-DOCD LE1/YR: CPT | Performed by: INTERNAL MEDICINE

## 2020-11-16 RX ORDER — AMLODIPINE BESYLATE 5 MG/1
TABLET ORAL
Qty: 90 TAB | Refills: 1 | Status: SHIPPED | OUTPATIENT
Start: 2020-11-16 | End: 2021-05-12

## 2020-11-16 RX ORDER — UREA 10 %
3000 LOTION (ML) TOPICAL DAILY
COMMUNITY
End: 2021-08-26

## 2020-11-16 RX ORDER — LISINOPRIL 40 MG/1
TABLET ORAL
Qty: 90 TAB | Refills: 1 | Status: SHIPPED | OUTPATIENT
Start: 2020-11-16 | End: 2021-05-12

## 2020-11-16 NOTE — PROGRESS NOTES
1. Have you been to the ER, urgent care clinic since your last visit? Hospitalized since your last visit? No    2. Have you seen or consulted any other health care providers outside of the [address] [place] [address] since your last visit? Include any pap smears or colon screening. Yes    Chief Complaint   Patient presents with    Hypertension     follow up       Please send link to [email or web address]

## 2020-11-16 NOTE — PATIENT INSTRUCTIONS
Medicare Wellness Visit, Female The best way to live healthy is to have a lifestyle where you eat a well-balanced diet, exercise regularly, limit alcohol use, and quit all forms of tobacco/nicotine, if applicable. Regular preventive services are another way to keep healthy. Preventive services (vaccines, screening tests, monitoring & exams) can help personalize your care plan, which helps you manage your own care. Screening tests can find health problems at the earliest stages, when they are easiest to treat. [facility] follows the current, evidence-based guidelines published by the [facility] Pearson ([facility]) when recommending preventive services for our patients. Because we follow these guidelines, sometimes recommendations change over time as research supports it. (For example, mammograms used to be recommended annually. Even though Medicare will still pay for an annual mammogram, the newer guidelines recommend a mammogram every two years for women of average risk). Of course, you and your doctor may decide to screen more often for some diseases, based on your risk and your co-morbidities (chronic disease you are already diagnosed with). Preventive services for you include: - Medicare offers their members a free annual wellness visit, which is time for you and your primary care provider to discuss and plan for your preventive service needs. Take advantage of this benefit every year! 
-All adults over the age of 72 should receive the recommended pneumonia vaccines. Current USPSTF guidelines recommend a series of two vaccines for the best pneumonia protection.  
-All adults should have a flu vaccine yearly and a tetanus vaccine every 10 years.  
-All adults age 48 and older should receive the shingles vaccines (series of two vaccines). -All adults age 38-68 who are overweight should have a diabetes screening test once every three years. -All adults born between [birth year] and 1967 should be screened once for Hepatitis C. 
-Other screening tests and preventive services for persons with diabetes include: an eye exam to screen for diabetic retinopathy, a kidney function test, a foot exam, and stricter control over your cholesterol.  
-Cardiovascular screening for adults with routine risk involves an electrocardiogram (ECG) at intervals determined by your doctor.  
-Colorectal cancer screenings should be done for adults age 54-65 with no increased risk factors for colorectal cancer. There are a number of acceptable methods of screening for this type of cancer. Each test has its own benefits and drawbacks. Discuss with your doctor what is most appropriate for you during your annual wellness visit. The different tests include: colonoscopy (considered the best screening method), a fecal occult blood test, a fecal DNA test, and sigmoidoscopy. 
 
-A bone mass density test is recommended when a woman turns 65 to screen for osteoporosis. This test is only recommended one time, as a screening. Some providers will use this same test as a disease monitoring tool if you already have osteoporosis. -Breast cancer screenings are recommended every other year for women of normal risk, age 54-69. 
-Cervical cancer screenings for women over age 72 are only recommended with certain risk factors. Here is a list of your current Health Maintenance items (your personalized list of preventive services) with a due date: 
Health Maintenance Due Topic Date Due  
 Annual Well Visit  01/09/2020  Yearly Flu Vaccine (1) 09/01/2020

## 2020-11-16 NOTE — PROGRESS NOTES
This is the Subsequent Medicare Annual Wellness Exam, performed 12 months or more after the Initial AWV or the last Subsequent AWV    I have reviewed the patient's medical history in detail and updated the computerized patient record. Depression Risk Factor Screening:     3 most recent PHQ Screens 11/16/2020   PHQ Not Done -   Little interest or pleasure in doing things Not at all   Feeling down, depressed, irritable, or hopeless Not at all   Total Score PHQ 2 0       Alcohol Risk Screen   Do you average more than 1 drink per night or more than 7 drinks a week:  No    On any one occasion in the past three months have you have had more than 3 drinks containing alcohol:  No        Functional Ability and Level of Safety:   Hearing: Hearing is good. Activities of Daily Living: The home contains: no safety equipment. Patient does total self care     Ambulation: with no difficulty     Fall Risk:  Fall Risk Assessment, last 12 mths 11/16/2020   Able to walk? Yes   Fall in past 12 months? No     Abuse Screen:  Patient is not abused       Cognitive Screening   Has your family/caregiver stated any concerns about your memory: no    Cognitive Screening: Normal - Verbal Fluency Test    Assessment/Plan   Education and counseling provided:  Are appropriate based on today's review and evaluation    Diagnoses and all orders for this visit:    1.  Medicare annual wellness visit, subsequent        Health Maintenance Due     Health Maintenance Due   Topic Date Due    Medicare Yearly Exam  01/09/2020    Flu Vaccine (1) 09/01/2020       Patient Care Team   Patient Care Team:  Marissa Lew MD as PCP - General (Internal Medicine)  Marissa Lew MD as PCP - REHABILITATION [facility] Empaneled Provider  Elías Rizzo MD (Ophthalmology)  Amando Lora MD (Neurology)  Blaine Otto MD (Obstetrics & Gynecology)  Giuseppe Mann MD as Physician (Sleep Medicine)    History     Patient Active Problem List   Diagnosis Code  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    Osteoporosis M81.0    Increased serum lipids E78.5    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    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    Age-related osteoporosis without current pathological fracture Z56.0    Lichen planus follicularis U45.5    HTN (hypertension), benign I10     Past Medical History:   Diagnosis Date    Arthritis     Gama esophagus     Blepharospasm     Cataract     Hx of mammogram 2019    Negative    Hyperlipidemia     Hypertension     Lichen planus bullosus     Neuralgia     Ocular hypertelorism     Osteoporosis 2016    DEXA    Routine Papanicolaou smear 2019    Negative (no hpv)     Sicca syndrome (HCC)     Sleep apnea       Past Surgical History:   Procedure Laterality Date    COLONOSCOPY N/A 2019    COLONOSCOPY performed by [facility]us [facility] at [facility] 56 HX CATARACT REMOVAL  2020    HX  SECTION      x2    HX ORTHOPAEDIC Left 1990s    trigger finger surgery    HX TONSILLECTOMY      at age 32   [de-identified] TUBAL LIGATION      90s; L/S     Current Outpatient Medications   Medication Sig Dispense Refill    cyanocobalamin (Vitamin B-12) 100 mcg tablet Take 3,000 mcg by mouth daily.  atorvastatin (LIPITOR) 10 mg tablet TAKE ONE TABLET BY MOUTH DAILY 90 Tab 0    lisinopriL (PRINIVIL, ZESTRIL) 40 mg tablet TAKE ONE TABLET BY MOUTH DAILY 90 Tab 0    amLODIPine (NORVASC) 5 mg tablet TAKE ONE TABLET BY MOUTH DAILY WITH DINNER 90 Tab 0    carboxymethylcellulose sodium (THERATEARS OP) Apply  to eye.  Lactobacillus acidophilus (PROBIOTIC PO) Take  by mouth daily. Indications: 14 strains      omega-3 fatty acids (FISH OIL CONCENTRATE PO) Take  by mouth daily.  timolol (TIMOPTIC) 0.5 % ophthalmic solution 1 Drop two (2) times a day.  RESTASIS 0.05 % ophthalmic emulsion 1 Drop daily. 6    prednisoLONE acetate (PRED FORTE) 1 % ophthalmic suspension Administer 1 Drop to both eyes four (4) times daily.       OTHER JAY-MAG 1:1      aspirin 81 mg chewable tablet Take 81 mg by mouth daily.  latanoprost (XALATAN) 0.005 % ophthalmic solution Administer 1 Drop to both eyes nightly.  clobetasol (TEMOVATE) 0.05 % external solution Apply  to affected area every seven (7) days.  strontium-89 chloride (METASTRON) 1 mCi/mL soln injection 4 millicuries by IntraVENous route once.  OTHER Take 680 mg by mouth daily. Indications: Strontium      TURMERIC  mg.      OTHER Hair, Skin and Nails - one gummy daily. Allergies   Allergen Reactions    Codeine Nausea and Vomiting    Keflex [Cephalexin] Nausea and Vomiting    Thiazides Unable to Obtain       Family History   Problem Relation Age of Onset   [facility] Alzheimer Mother          @ 81yo    Heart Disease Father     Diabetes Father     Alzheimer Father     Cancer Brother         Lung ( @ 62yo)   [facility] Cancer Sister         Hodgkins Disease ( at 44yo)   [facility] Other Sister         Non-Hodkins Disease     Breast Problems Maternal Aunt      Social History     Tobacco Use    Smoking status: Never Smoker    Smokeless tobacco: Never Used   Substance Use Topics    Alcohol use: Not Currently       Kenneth Brooks, who was evaluated through a synchronous (real-time) audio-video encounter, and/or her healthcare decision maker, is aware that it is a billable service, with coverage as determined by her insurance carrier. She provided verbal consent to proceed: Yes, and patient identification was verified. It was conducted pursuant to the emergency declaration under the [address] [place] [address], [address] [place] [address] and the [employer] Act. A caregiver was present when appropriate. Ability to conduct physical exam was limited. I was at home. The patient was at home. MD Alisha Peters    Subjective: Follow up hypertension.   We added Amlodipine to her regimen of Lisinopril and bumped her dose of Lisinopril. Most of her blood pressure readings were normal, lately they have been a little high, but she has been more anxious lately. She says she only sleeps five hours at night and is nervous and worried. She is worried about COVID, politics. She just had a patio put in at her home and she is concerned. She has had no symptoms or side effects, no shortness of breath, chest pain, heart palpitations, no dizzy spells, no ER visits, no neurological symptoms. We reviewed what she is getting at home for blood pressure readings. I told her that her Lisinopril and Amlodipine together is a good combination. She probably has a lot of underlying anxiety and I told her to continue her statin dose, which is fine, and her labs in February were normal.  I told her we would be due to recheck her blood work again in February of 2021 and she will continue with Amlodipine and Lisinopril at present doses. 1. Medicare annual wellness visit, subsequent  reviewed    2.  HTN (hypertension), benign  Control OK  Requested Prescriptions     Signed Prescriptions Disp Refills    lisinopriL (PRINIVIL, ZESTRIL) 40 mg tablet 90 Tab 1     Sig: TAKE ONE TABLET BY MOUTH DAILY    amLODIPine (NORVASC) 5 mg tablet 90 Tab 1     Sig: TAKE ONE TABLET BY MOUTH DAILY WITH DINNER

## 2021-01-22 ENCOUNTER — NURSE TRIAGE (OUTPATIENT)
Dept: OTHER | Facility: CLINIC | Age: 71
End: 2021-01-22

## 2021-01-22 NOTE — TELEPHONE ENCOUNTER
Reason for Disposition   [1] COVID-19 infection suspected by caller or triager AND [2] mild symptoms (cough, fever, or others) AND [0] no complications or SOB    Answer Assessment - Initial Assessment Questions  1. COVID-19 DIAGNOSIS: \"Who made your Coronavirus (COVID-19) diagnosis? \" \"Was it confirmed by a positive lab test?\" If not diagnosed by a HCP, ask \"Are there lots of cases (community spread) where you live? \" (See public health department website, if unsure)      No diagnosisi yet    2. COVID-19 EXPOSURE: \"Was there any known exposure to COVID before the symptoms began? \" CDC Definition of close contact: within 6 feet (2 meters) for a total of 15 minutes or more over a 24-hour period. No known exposure    3. ONSET: \"When did the COVID-19 symptoms start? \"       Yesterday    4. WORST SYMPTOM: \"What is your worst symptom? \" (e.g., cough, fever, shortness of breath, muscle aches)      Nausea  n  5. COUGH: \"Do you have a cough? \" If so, ask: \"How bad is the cough? \"        No    6. FEVER: \"Do you have a fever? \" If so, ask: \"What is your temperature, how was it measured, and when did it start? \"      No    7. RESPIRATORY STATUS: \"Describe your breathing? \" (e.g., shortness of breath, wheezing, unable to speak)       No    8. BETTER-SAME-WORSE: Jessica Lovett you getting better, staying the same or getting worse compared to yesterday? \"  If getting worse, ask, \"In what way? \"      Same    9. HIGH RISK DISEASE: \"Do you have any chronic medical problems? \" (e.g., asthma, heart or lung disease, weak immune system, obesity, etc.)      HTN    10. PREGNANCY: \"Is there any chance you are pregnant? \" \"When was your last menstrual period? \"        No    11. OTHER SYMPTOMS: \"Do you have any other symptoms? \"  (e.g., chills, fatigue, headache, loss of smell or taste, muscle pain, sore throat; new loss of smell or taste especially support the diagnosis of COVID-19)        no    Protocols used: CORONAVIRUS (COVID-19) DIAGNOSED OR SUSPECTED-ADULT-AH  Patient called [facility] with red flag complaint. Brief description of triage: requesting Covid testing    Triage indicates for patient to Call PCP office when open    Care advice provided, patient verbalizes understanding; denies any other questions or concerns; instructed to call back for any new or worsening symptoms. Writer provided warm transfer to [facility] for testing locations    Attention Provider: Thank you for allowing me to participate in the care of your patient. The patient was connected to triage in response to information from calling the [facility]. Please do not respond through this encounter as the response is not directed to a shared pool.

## 2021-01-27 ENCOUNTER — VIRTUAL VISIT (OUTPATIENT)
Dept: INTERNAL MEDICINE CLINIC | Age: 71
End: 2021-01-27
Payer: MEDICARE

## 2021-01-27 DIAGNOSIS — I10 HTN (HYPERTENSION), BENIGN: ICD-10-CM

## 2021-01-27 DIAGNOSIS — Z87.898 HISTORY OF NAUSEA: Primary | ICD-10-CM

## 2021-01-27 PROCEDURE — G8427 DOCREV CUR MEDS BY ELIG CLIN: HCPCS | Performed by: INTERNAL MEDICINE

## 2021-01-27 PROCEDURE — G8756 NO BP MEASURE DOC: HCPCS | Performed by: INTERNAL MEDICINE

## 2021-01-27 PROCEDURE — 1090F PRES/ABSN URINE INCON ASSESS: CPT | Performed by: INTERNAL MEDICINE

## 2021-01-27 PROCEDURE — 3017F COLORECTAL CA SCREEN DOC REV: CPT | Performed by: INTERNAL MEDICINE

## 2021-01-27 PROCEDURE — 1101F PT FALLS ASSESS-DOCD LE1/YR: CPT | Performed by: INTERNAL MEDICINE

## 2021-01-27 PROCEDURE — 99213 OFFICE O/P EST LOW 20 MIN: CPT | Performed by: INTERNAL MEDICINE

## 2021-01-27 PROCEDURE — G8536 NO DOC ELDER MAL SCRN: HCPCS | Performed by: INTERNAL MEDICINE

## 2021-01-27 PROCEDURE — G8420 CALC BMI NORM PARAMETERS: HCPCS | Performed by: INTERNAL MEDICINE

## 2021-01-27 PROCEDURE — G8510 SCR DEP NEG, NO PLAN REQD: HCPCS | Performed by: INTERNAL MEDICINE

## 2021-01-27 NOTE — PROGRESS NOTES
Chief Complaint   Patient presents with    Follow-up    feels well now was nauseated one day after old lettuce    Subjective: Followed for hypertension, dyslipidemia, most of the blood pressure readings have been normal.  There are a couple that have been high. BP goes up some with anxiety. She is walking and trying to stay thin, has not gained a lot of weight, in fact has kept her weight really down, which is great and I applauded her for that. She goes to see eye doctor due to need for injections due to blepharospasm, was told to have diplopia and may need surgery eventually. One eye doctor thinks she has myasthenia gravis, but her full workup was negative. She looked well. She had a transient episode last week of nausea, but it has gone. Plan:  I told her to stay on same meds. Lab work is needed, but she wants to wait and I told her will wait and do it in the summer and come back and see me in six months. 1. History of nausea  Resolved from food    2.  HTN (hypertension), benign  Control OK

## 2021-01-27 NOTE — PROGRESS NOTES
1. Have you been to the ER, urgent care clinic since your last visit? Hospitalized since your last visit? No    2. Have you seen or consulted any other health care providers outside of the [address] [place] [address] since your last visit? Include any pap smears or colon screening. Yes    Chief Complaint   Patient presents with    Follow-up       Please send link [email or web address]

## 2021-03-29 ENCOUNTER — OFFICE VISIT (OUTPATIENT)
Dept: INTERNAL MEDICINE CLINIC | Age: 71
End: 2021-03-29
Payer: MEDICARE

## 2021-03-29 VITALS
BODY MASS INDEX: 23.3 KG/M2 | DIASTOLIC BLOOD PRESSURE: 81 MMHG | HEIGHT: 58 IN | SYSTOLIC BLOOD PRESSURE: 140 MMHG | TEMPERATURE: 98.5 F | WEIGHT: 111 LBS | HEART RATE: 75 BPM | RESPIRATION RATE: 16 BRPM | OXYGEN SATURATION: 98 %

## 2021-03-29 DIAGNOSIS — R35.0 FREQUENCY OF URINATION: Primary | ICD-10-CM

## 2021-03-29 LAB
BILIRUB UR QL STRIP: NEGATIVE
GLUCOSE UR-MCNC: NEGATIVE MG/DL
KETONES P FAST UR STRIP-MCNC: NEGATIVE MG/DL
PH UR STRIP: 7 [PH] (ref 4.6–8)
PROT UR QL STRIP: NEGATIVE
SP GR UR STRIP: 1.01 (ref 1–1.03)
UA UROBILINOGEN AMB POC: NORMAL (ref 0.2–1)
URINALYSIS CLARITY POC: CLEAR
URINALYSIS COLOR POC: YELLOW
URINE BLOOD POC: NORMAL
URINE LEUKOCYTES POC: NEGATIVE
URINE NITRITES POC: NEGATIVE

## 2021-03-29 PROCEDURE — G8510 SCR DEP NEG, NO PLAN REQD: HCPCS | Performed by: INTERNAL MEDICINE

## 2021-03-29 PROCEDURE — G8536 NO DOC ELDER MAL SCRN: HCPCS | Performed by: INTERNAL MEDICINE

## 2021-03-29 PROCEDURE — G8753 SYS BP > OR = 140: HCPCS | Performed by: INTERNAL MEDICINE

## 2021-03-29 PROCEDURE — G8754 DIAS BP LESS 90: HCPCS | Performed by: INTERNAL MEDICINE

## 2021-03-29 PROCEDURE — G8427 DOCREV CUR MEDS BY ELIG CLIN: HCPCS | Performed by: INTERNAL MEDICINE

## 2021-03-29 PROCEDURE — G8420 CALC BMI NORM PARAMETERS: HCPCS | Performed by: INTERNAL MEDICINE

## 2021-03-29 PROCEDURE — 99213 OFFICE O/P EST LOW 20 MIN: CPT | Performed by: INTERNAL MEDICINE

## 2021-03-29 PROCEDURE — G0463 HOSPITAL OUTPT CLINIC VISIT: HCPCS | Performed by: INTERNAL MEDICINE

## 2021-03-29 PROCEDURE — 3017F COLORECTAL CA SCREEN DOC REV: CPT | Performed by: INTERNAL MEDICINE

## 2021-03-29 PROCEDURE — 1090F PRES/ABSN URINE INCON ASSESS: CPT | Performed by: INTERNAL MEDICINE

## 2021-03-29 PROCEDURE — 81002 URINALYSIS NONAUTO W/O SCOPE: CPT | Performed by: INTERNAL MEDICINE

## 2021-03-29 PROCEDURE — 1101F PT FALLS ASSESS-DOCD LE1/YR: CPT | Performed by: INTERNAL MEDICINE

## 2021-03-29 RX ORDER — PHENAZOPYRIDINE HYDROCHLORIDE 200 MG/1
200 TABLET, FILM COATED ORAL
Qty: 9 TAB | Refills: 0 | Status: SHIPPED | OUTPATIENT
Start: 2021-03-29 | End: 2021-04-01

## 2021-03-29 NOTE — PROGRESS NOTES
Results for orders placed or performed in visit on 03/29/21   AMB POC URINALYSIS DIP STICK MANUAL W/O MICRO   Result Value Ref Range    Color (UA POC) Yellow     Clarity (UA POC) Clear     Glucose (UA POC) Negative Negative    Bilirubin (UA POC) Negative Negative    Ketones (UA POC) Negative Negative    Specific gravity (UA POC) 1.015 1.001 - 1.035    Blood (UA POC) Trace Negative    pH (UA POC) 7.0 4.6 - 8.0    Protein (UA POC) Negative Negative    Urobilinogen (UA POC) 0.2 mg/dL 0.2 - 1    Nitrites (UA POC) Negative Negative    Leukocyte esterase (UA POC) Negative Negative     Chief Complaint   Patient presents with    Abdominal Pain    Urinary Frequency     No bowel issues vague LLQ and suprabic pain with some dysuria      Anxious    Vitals:    03/29/21 1610   BP: (!) 140/81   Pulse: 75   Resp: 16   Temp: 98.5 °F (36.9 °C)   TempSrc: Temporal   SpO2: 98%   Weight: 111 lb (50.3 kg)   Height: 4' 10\" (1.473 m)     The abdomen is soft without tenderness, guarding, mass, rebound or organomegaly. Bowel sounds are normal. No CVA tenderness or inguinal adenopathy noted. Urine clear    1. Frequency of urination  Unclear cause    Try pyridium    Diagnoses and all orders for this visit:    1. Frequency of urination  -     AMB POC URINALYSIS DIP STICK MANUAL W/O MICRO    Other orders  -     phenazopyridine (PYRIDIUM) 200 mg tablet;  Take 1 Tab by mouth three (3) times daily as needed for Pain for up to 3 days.        - AMB POC URINALYSIS DIP STICK MANUAL W/O MICRO

## 2021-03-29 NOTE — PROGRESS NOTES
Patient having pain in the left lower abdomen with urinary pressure and frequency, not emptying bladder.

## 2021-04-10 ENCOUNTER — IMMUNIZATION (OUTPATIENT)
Dept: INTERNAL MEDICINE CLINIC | Age: 71
End: 2021-04-10
Payer: MEDICARE

## 2021-04-10 DIAGNOSIS — Z23 ENCOUNTER FOR IMMUNIZATION: Primary | ICD-10-CM

## 2021-04-10 PROCEDURE — 0001A COVID-19, MRNA, LNP-S, PF, 30MCG/0.3ML DOSE(PFIZER): CPT | Performed by: FAMILY MEDICINE

## 2021-04-10 PROCEDURE — 91300 COVID-19, MRNA, LNP-S, PF, 30MCG/0.3ML DOSE(PFIZER): CPT | Performed by: FAMILY MEDICINE

## 2021-05-01 ENCOUNTER — IMMUNIZATION (OUTPATIENT)
Dept: INTERNAL MEDICINE CLINIC | Age: 71
End: 2021-05-01
Payer: MEDICARE

## 2021-05-01 DIAGNOSIS — Z23 ENCOUNTER FOR IMMUNIZATION: Primary | ICD-10-CM

## 2021-05-01 PROCEDURE — 0002A COVID-19, MRNA, LNP-S, PF, 30MCG/0.3ML DOSE(PFIZER): CPT | Performed by: FAMILY MEDICINE

## 2021-05-01 PROCEDURE — 91300 COVID-19, MRNA, LNP-S, PF, 30MCG/0.3ML DOSE(PFIZER): CPT | Performed by: FAMILY MEDICINE

## 2021-05-19 ENCOUNTER — OFFICE VISIT (OUTPATIENT)
Dept: INTERNAL MEDICINE CLINIC | Age: 71
End: 2021-05-19
Payer: MEDICARE

## 2021-05-19 VITALS
TEMPERATURE: 98.2 F | BODY MASS INDEX: 23.34 KG/M2 | WEIGHT: 111.2 LBS | RESPIRATION RATE: 16 BRPM | SYSTOLIC BLOOD PRESSURE: 145 MMHG | OXYGEN SATURATION: 100 % | HEART RATE: 58 BPM | HEIGHT: 58 IN | DIASTOLIC BLOOD PRESSURE: 70 MMHG

## 2021-05-19 DIAGNOSIS — Z78.0 MENOPAUSE: ICD-10-CM

## 2021-05-19 DIAGNOSIS — Z12.31 BREAST CANCER SCREENING BY MAMMOGRAM: ICD-10-CM

## 2021-05-19 DIAGNOSIS — K22.70 BARRETT'S ESOPHAGUS WITHOUT DYSPLASIA: ICD-10-CM

## 2021-05-19 DIAGNOSIS — E78.5 INCREASED SERUM LIPIDS: ICD-10-CM

## 2021-05-19 DIAGNOSIS — I10 HTN (HYPERTENSION), BENIGN: Primary | ICD-10-CM

## 2021-05-19 DIAGNOSIS — M81.0 AGE-RELATED OSTEOPOROSIS WITHOUT CURRENT PATHOLOGICAL FRACTURE: ICD-10-CM

## 2021-05-19 LAB
25(OH)D3 SERPL-MCNC: 27.3 NG/ML (ref 30–100)
ALBUMIN SERPL-MCNC: 4 G/DL (ref 3.5–5)
ALBUMIN/GLOB SERPL: 1.3 {RATIO} (ref 1.1–2.2)
ALP SERPL-CCNC: 80 U/L (ref 45–117)
ALT SERPL-CCNC: 32 U/L (ref 12–78)
ANION GAP SERPL CALC-SCNC: 2 MMOL/L (ref 5–15)
AST SERPL-CCNC: 22 U/L (ref 15–37)
BASOPHILS # BLD: 0 K/UL (ref 0–0.1)
BASOPHILS NFR BLD: 1 % (ref 0–1)
BILIRUB SERPL-MCNC: 0.7 MG/DL (ref 0.2–1)
BUN SERPL-MCNC: 10 MG/DL (ref 6–20)
BUN/CREAT SERPL: 14 (ref 12–20)
CALCIUM SERPL-MCNC: 9.7 MG/DL (ref 8.5–10.1)
CHLORIDE SERPL-SCNC: 105 MMOL/L (ref 97–108)
CHOLEST SERPL-MCNC: 185 MG/DL
CO2 SERPL-SCNC: 34 MMOL/L (ref 21–32)
CREAT SERPL-MCNC: 0.72 MG/DL (ref 0.55–1.02)
DIFFERENTIAL METHOD BLD: NORMAL
EOSINOPHIL # BLD: 0.2 K/UL (ref 0–0.4)
EOSINOPHIL NFR BLD: 6 % (ref 0–7)
ERYTHROCYTE [DISTWIDTH] IN BLOOD BY AUTOMATED COUNT: 12.7 % (ref 11.5–14.5)
GLOBULIN SER CALC-MCNC: 3.2 G/DL (ref 2–4)
GLUCOSE SERPL-MCNC: 103 MG/DL (ref 65–100)
HCT VFR BLD AUTO: 41 % (ref 35–47)
HDLC SERPL-MCNC: 94 MG/DL
HDLC SERPL: 2 {RATIO} (ref 0–5)
HGB BLD-MCNC: 13.4 G/DL (ref 11.5–16)
IMM GRANULOCYTES # BLD AUTO: 0 K/UL (ref 0–0.04)
IMM GRANULOCYTES NFR BLD AUTO: 0 % (ref 0–0.5)
LDLC SERPL CALC-MCNC: 78.2 MG/DL (ref 0–100)
LYMPHOCYTES # BLD: 1.1 K/UL (ref 0.8–3.5)
LYMPHOCYTES NFR BLD: 29 % (ref 12–49)
MAGNESIUM SERPL-MCNC: 2.2 MG/DL (ref 1.6–2.4)
MCH RBC QN AUTO: 31.2 PG (ref 26–34)
MCHC RBC AUTO-ENTMCNC: 32.7 G/DL (ref 30–36.5)
MCV RBC AUTO: 95.3 FL (ref 80–99)
MONOCYTES # BLD: 0.4 K/UL (ref 0–1)
MONOCYTES NFR BLD: 10 % (ref 5–13)
NEUTS SEG # BLD: 2 K/UL (ref 1.8–8)
NEUTS SEG NFR BLD: 54 % (ref 32–75)
NRBC # BLD: 0 K/UL (ref 0–0.01)
NRBC BLD-RTO: 0 PER 100 WBC
PLATELET # BLD AUTO: 180 K/UL (ref 150–400)
PMV BLD AUTO: 9.9 FL (ref 8.9–12.9)
POTASSIUM SERPL-SCNC: 4.5 MMOL/L (ref 3.5–5.1)
PROT SERPL-MCNC: 7.2 G/DL (ref 6.4–8.2)
RBC # BLD AUTO: 4.3 M/UL (ref 3.8–5.2)
SODIUM SERPL-SCNC: 141 MMOL/L (ref 136–145)
TRIGL SERPL-MCNC: 64 MG/DL (ref ?–150)
VLDLC SERPL CALC-MCNC: 12.8 MG/DL
WBC # BLD AUTO: 3.8 K/UL (ref 3.6–11)

## 2021-05-19 PROCEDURE — 1101F PT FALLS ASSESS-DOCD LE1/YR: CPT | Performed by: INTERNAL MEDICINE

## 2021-05-19 PROCEDURE — 1090F PRES/ABSN URINE INCON ASSESS: CPT | Performed by: INTERNAL MEDICINE

## 2021-05-19 PROCEDURE — G8753 SYS BP > OR = 140: HCPCS | Performed by: INTERNAL MEDICINE

## 2021-05-19 PROCEDURE — G8754 DIAS BP LESS 90: HCPCS | Performed by: INTERNAL MEDICINE

## 2021-05-19 PROCEDURE — G8510 SCR DEP NEG, NO PLAN REQD: HCPCS | Performed by: INTERNAL MEDICINE

## 2021-05-19 PROCEDURE — 99214 OFFICE O/P EST MOD 30 MIN: CPT | Performed by: INTERNAL MEDICINE

## 2021-05-19 PROCEDURE — 3017F COLORECTAL CA SCREEN DOC REV: CPT | Performed by: INTERNAL MEDICINE

## 2021-05-19 PROCEDURE — G0463 HOSPITAL OUTPT CLINIC VISIT: HCPCS | Performed by: INTERNAL MEDICINE

## 2021-05-19 PROCEDURE — G9899 SCRN MAM PERF RSLTS DOC: HCPCS | Performed by: INTERNAL MEDICINE

## 2021-05-19 PROCEDURE — G8536 NO DOC ELDER MAL SCRN: HCPCS | Performed by: INTERNAL MEDICINE

## 2021-05-19 PROCEDURE — G8420 CALC BMI NORM PARAMETERS: HCPCS | Performed by: INTERNAL MEDICINE

## 2021-05-19 PROCEDURE — G8427 DOCREV CUR MEDS BY ELIG CLIN: HCPCS | Performed by: INTERNAL MEDICINE

## 2021-05-19 NOTE — PROGRESS NOTES
Patient has been informed of any other discussion outside the parameters of the physical could result in other charges including a co pay, patient verbalized understanding. 1. Have you been to the ER, urgent care clinic since your last visit? Hospitalized since your last visit? No    2. Have you seen or consulted any other health care providers outside of the [address] [place] [address] since your last visit? Include any pap smears or colon screening.  No   Chief Complaint   Patient presents with   [facility] Annual Wellness Visit

## 2021-05-19 NOTE — PROGRESS NOTES
Chief Complaint   Patient presents with    Annual Wellness Visit     SUBJECTIVE: Aggie Saul is a 65 y.o. female seen for a follow up visit; she has hypertension and hyperlipidemia. Current Outpatient Medications   Medication Sig Dispense Refill    amLODIPine (NORVASC) 5 mg tablet TAKE ONE TABLET BY MOUTH DAILY WITH DINNER 90 Tab 1    lisinopriL (PRINIVIL, ZESTRIL) 40 mg tablet TAKE ONE TABLET BY MOUTH DAILY 90 Tab 1    atorvastatin (LIPITOR) 10 mg tablet TAKE ONE TABLET BY MOUTH DAILY 90 Tab 3    cyanocobalamin (Vitamin B-12) 100 mcg tablet Take 3,000 mcg by mouth daily.  carboxymethylcellulose sodium (THERATEARS OP) Apply  to eye.  OTHER JAY-MAG 1:1      TURMERIC  mg.  Lactobacillus acidophilus (PROBIOTIC PO) Take  by mouth daily. Indications: 14 strains      timolol (TIMOPTIC) 0.5 % ophthalmic solution 1 Drop two (2) times a day.  RESTASIS 0.05 % ophthalmic emulsion 1 Drop daily. 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 New concerns: e had an MRI of the brain that was done at [facility], where __her neurologist____________ works. It showed hyperintense signal flare consistent with microangiopathic changes. There was some chronic small vessel changes. Nothing acute, no mass, hemorrhage, stroke or tumor.   It was considered a normal MRI for her age. The question was whether she has a bilateral sixth nerve palsy. This is still unclear. She has follow up visits with ophthalmology and she also has testing now with cardiology based on her chest pain and her symptoms that she had. She has had no further symptoms. She has a history of Gama's esophagus has an endoscopy planned. ER told her to take an aspirin, told her the aspirin may flare up her Gama's and since there was no evidence of ischemia on her initial testing, I told her I am okay with her not taking aspirin until she has further tests. results reviewed  Has untreated osteoporosis ttakes some supplements  Immunization status: up to date and documented. DEXA Results (most recent):  Results from Hospital Encounter encounter on 09/21/16    DEXA BONE DENSITY STUDY AXIAL    Narrative  Bone Mineral Density    Indication:  screening  Age: 74  Sex: Female. Menopause status: postmenopausal.  Hormone replacement therapy: No    Number of falls in the past year:   1  Risk factors for osteoporosis:  None. Current medication for osteoporosis: None. Comparison: None. Technique: Imaging was performed on the [employer]    Excluded sites: None    Findings:      Femoral Neck:  Left  Bone mineral density (gm/cm2):? 0.673  % of peak bone mass: 65  % for age matched controls:? 80  T-score: -2.6  Z-score: -0.7    Total Hip: Left  Bone mineral density (gm/cm2):  0.870  % of peak bone mass:   86  % for age matched controls:  110  T-score:   -1.1  Z-score:  0.6    Lumbar Spine:  L1-L4  Bone mineral density (gm/cm2):  0.863  % of peak bone mass:  73  % for age matched controls:  80  T-score:  -2.7  Z-score:  -0.5    The T score for the left ultra distal radius is -2.5. The T score for the left  distal third radius is -1.8. Impression  Impression:     This patient is osteoporotic using the World Health Organization criteria      Recommendations:  Therapy recommendations need to be tailored to each individual patient. Please reconsider testing based on risk factors. Currently, Medicare will only  reimburse for a central DXA examination every two years, unless the patient is  on chronic glucocorticoid therapy. Note: Please note that reliable, valid comparisons cannot be made between  studies which have been performed on machines from different manufacturers. If  clinically warranted, a follow up study performed at this site, on the same  unit, would allow the most sensitive assessment of change in bone mineral  density. . Patient does exercise daily. Family history is positive for osteoporosis. Age at menopause 46. Patient does not smoke. Previous treatment calcium with vitamin D 1200mg daily and was on evista for a short while. Home BP  130s /80  Sometime 130/ 90  OBJECTIVE:  Vitals:    05/19/21 0923 05/19/21 0947   BP: (!) 157/68 (!) 145/70   Pulse: (!) 58    Resp: 16    Temp: 98.2 °F (36.8 °C)    TempSrc: Temporal    SpO2: 100%    Weight: 111 lb 3.2 oz (50.4 kg)    Height: 4' 10\" (1.473 m)        Vitals:    05/19/21 0923 05/19/21 0947   BP: (!) 157/68 (!) 145/70   Pulse: (!) 58    Resp: 16    Temp: 98.2 °F (36.8 °C)    TempSrc: Temporal    SpO2: 100%    Weight: 111 lb 3.2 oz (50.4 kg)    Height: 4' 10\" (1.473 m)        Appearance: alert, well appearing, and in no distress, oriented to person, place, and time and normal appearing weight. General exam: CVS exam BP noted to be borderline elevated today in office, S1, S2 normal, no gallop, no murmur, chest clear, no JVD, no HSM, no edema, peripheral vascular exam both carotids normal upstroke without bruits, neurological exam alert, oriented, normal speech, no focal findings or movement disorder noted, normal muscle tone, no tremors, strength 5/5. left temporal tenderness  Lab review: pending  Lab Results   Component Value Date/Time    Cholesterol, total 193 02/24/2020 12:18 PM    HDL Cholesterol 99 02/24/2020 12:18 PM    LDL, calculated 82 02/24/2020 12:18 PM    VLDL, calculated 12 02/24/2020 12:18 PM    Triglyceride 60 02/24/2020 12:18 PM    CHOL/HDL Ratio 1.9 02/24/2020 12:18 PM       ASSESSMENT:  hypertension mod controlled  In office better at home, reasonably well controlled, needs further observation. PLAN:  current treatment plan is effective, no change in therapy  lab results and schedule of future lab studies reviewed with patient  repeat labs ordered prior to next appointment. 1. HTN (hypertension), benign  fair  - CBC WITH AUTOMATED DIFF; Future  - LIPID PANEL; Future  - METABOLIC PANEL, COMPREHENSIVE; Future    2. Age-related osteoporosis without current pathological fracture  Advise prolia may be best option  - VITAMIN D, 25 HYDROXY; Future  - MAGNESIUM; Future    3. Increased serum lipids  On statin    4. Gama's esophagus without dysplasia  Stable being watched    5. Menopause  Due for another  - DEXA BONE DENSITY STUDY AXIAL; Future    6. Breast cancer screening by mammogram  Due now  - BIN MAMMO BI SCREENING INCL CAD;  Future

## 2021-05-27 NOTE — PROGRESS NOTES
Chief Complaint Patient presents with  Follow-up SUBJECTIVE: Ct Mercado is a 77 y.o. female seen for a follow up visit; she has hypertension and hyperlipidemia. Current Outpatient Medications Medication Sig Dispense Refill  omega-3 fatty acids (FISH OIL CONCENTRATE PO) Take  by mouth.  multivitamin (ONE A DAY) tablet Take 1 Tab by mouth daily.  timolol (TIMOPTIC) 0.5 % ophthalmic solution 1 Drop two (2) times a day.  dorzolamide HCl/PF (DORZOLAMIDE, PF,) 2 % drop Apply  to eye.  lisinopril (PRINIVIL, ZESTRIL) 20 mg tablet TAKE ONE TABLET BY MOUTH DAILY 90 Tab 1  
 atorvastatin (LIPITOR) 10 mg tablet Take 1 Tab by mouth daily. 90 Tab 3  
 latanoprost (XALATAN) 0.005 % ophthalmic solution Administer 1 Drop to left eye nightly.  RESTASIS 0.05 % ophthalmic emulsion   6  
 varicella-zoster recombinant, PF, (SHINGRIX, PF,) 50 mcg/0.5 mL susr injection 0.5mL by IntraMUSCular route once now and then repeat in 2-6 months 0.5 mL 1 Patient Active Problem List  
Diagnosis Code  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  Osteoporosis M81.0  
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 
hypertension 
hyperlipidemia, Cardiovascular ROS - taking medications as instructed, no medication side effects noted, no TIA's, no chest pain on exertion, no dyspnea on exertion, no swelling of ankles, no orthopnea or paroxysmal nocturnal dyspnea, no muscle aches or pain. New concerns: Patient  also  is here for evaluation of osteoporosis. She has not had history of fracture She hasn't taken any of the supplements and refused osteoporosis meds. She's been able to wean herself off of her proton pump inhibitors and really doesn't get reflux.   She's planned on scheduling a repeat endoscopy this year. Her blood pressure as been running high. She saw ophthalmology and they told her she has increased pressure in the left eye, been having some temporal headaches. No loss of vision. She is taking exercise classes, balance classes, laura-chi, all of which are good and she has a healthy diet. DEXA Results (most recent): 
Results from Hospital Encounter encounter on 09/21/16 DEXA BONE DENSITY STUDY AXIAL Narrative Bone Mineral Density Indication:  screening Age: 74 Sex: Female. Menopause status: postmenopausal. 
Hormone replacement therapy: No  
 
Number of falls in the past year:   1 Risk factors for osteoporosis:  None. Current medication for osteoporosis: None. Comparison: None. Technique: Imaging was performed on the [employer] Excluded sites: None Findings: 
  
  
Femoral Neck:  Left Bone mineral density (gm/cm2):? 0.673 
% of peak bone mass: 65 
% for age matched controls:? 87 
T-score: -2.6 Z-score: -0.7 Total Hip: Left Bone mineral density (gm/cm2):  0.870 
% of peak bone mass:   86 
% for age matched controls:  110 T-score:   -1.1 Z-score:  0.6 Lumbar Spine:  L1-L4 Bone mineral density (gm/cm2):  0.863 
% of peak bone mass:  73  
% for age matched controls:  80 
T-score:  -2.7 Z-score:  -0.5 The T score for the left ultra distal radius is -2.5. The T score for the left 
distal third radius is -1.8. Impression Impression: This patient is osteoporotic using the World Health Organization criteria Recommendations: 
Therapy recommendations need to be tailored to each individual patient. Please reconsider testing based on risk factors. Currently, Medicare will only 
reimburse for a central DXA examination every two years, unless the patient is 
on chronic glucocorticoid therapy.  
 
Note: Please note that reliable, valid comparisons cannot be made between 
studies which have been performed on machines from different manufacturers. If 
clinically warranted, a follow up study performed at this site, on the same 
unit, would allow the most sensitive assessment of change in bone mineral 
density. . Patient does exercise daily. Family history is positive for osteoporosis. Age at menopause 46. Patient does not smoke. Previous treatment calcium with vitamin D 1200mg daily and was on evista for a short while. .  
Off gerd meds no symptoms Needs egd to confirm OBJECTIVE: 
Visit Vitals /73 Pulse 65 Temp 98.4 °F (36.9 °C) (Oral) Ht 4' 10\" (1.473 m) Wt 114 lb 9.6 oz (52 kg) SpO2 100% BMI 23.95 kg/m² Vitals:  
 02/27/19 1520 BP: 140/73 Pulse: 65 Temp: 98.4 °F (36.9 °C) TempSrc: Oral  
SpO2: 100% Weight: 114 lb 9.6 oz (52 kg) Height: 4' 10\" (1.473 m)  
was 120 / 68 at neurologist this morning Appearance: alert, well appearing, and in no distress, oriented to person, place, and time and normal appearing weight. General exam: CVS exam BP noted to be borderline elevated today in office, S1, S2 normal, no gallop, no murmur, chest clear, no JVD, no HSM, no edema, peripheral vascular exam both carotids normal upstroke without bruits, neurological exam alert, oriented, normal speech, no focal findings or movement disorder noted, normal muscle tone, no tremors, strength 5/5. left temporal tenderness Lab review: most recent lipid panel reviewed, showing LDL result meets goal.  
Lab Results Component Value Date/Time Cholesterol, total 157 02/22/2019 09:03 AM  
 HDL Cholesterol 77 02/22/2019 09:03 AM  
 LDL, calculated 69 02/22/2019 09:03 AM  
 VLDL, calculated 11 02/22/2019 09:03 AM  
 Triglyceride 55 02/22/2019 09:03 AM  
 
 
ASSESSMENT: 
hypertension better controlled, reasonably well controlled, needs further observation. PLAN: 
current treatment plan is effective, no change in therapy 
lab results and schedule of future lab studies reviewed with patient repeat labs ordered prior to next appointment. Reviewed   Data suggest biphosphonate Will   Repeat BMD 2018 Weight bearing Calcium supplement 500 mg daily with Vit D  600 to 1000 units daily advised for bone protection 1. Dyslipidemia, goal LDL below 100 
better 2. HTN (hypertension), benign Borderline but doing better Quality 47: Advance Care Plan: Advance Care Planning discussed and documented; advance care plan or surrogate decision maker documented in the medical record.

## 2021-06-01 NOTE — PROGRESS NOTES
Annual exam ages 69+ note    Diamante Sender is a ,  65 y.o. female WHITE whose No LMP recorded. Patient is postmenopausal.  was on  who presents for her annual checkup. She is having no significant problems. LV=19    With regard to the Gardasil vaccine, she is older than the age for which it is FDA approved. Menstrual status:    Her periods are nonexistent in flow. .    Hormonal status:  She reports no perimenstrual type symptoms. She is not having vasomotor symptoms. The patient is not using any ERT. Sexual history:     reports previously being sexually active and has had partner(s) who are Male. She reports using the following method of birth control/protection: Surgical.    Medical conditions:    Since her last annual GYN exam about two years ago, she has not the following changes in her health history: none. Surgical history confirmed with patient. has a past surgical history that includes hx tubal ligation; hx  section; hx tonsillectomy; hx orthopaedic (Left, ); colonoscopy (N/A, 2019); and hx cataract removal (2020). Pap and Mammogram History:    Her most recent Pap smear wasnormal obtained 2 year(s) ago. The patient has a mammo scheduled for 10/2021. Breast Cancer History/Substance Abuse:    Family Medical/Cancer History:   Family History   Problem Relation Age of Onset   Nasim Strickland Mother          @ 81yo    Heart Disease Father     Diabetes Father     Alzheimer Father     Cancer Brother         Lung ( @ 62yo)   Nasim Re Cancer Sister         Hodgkins Disease ( at 42yo)   Nasim Re Other Sister         Non-Hodkins Disease     Breast Problems Maternal Aunt       Tobacco History:  reports that she has never smoked. She has never used smokeless tobacco.  Alcohol Abuse:  reports previous alcohol use. Drug Abuse:  reports no history of drug use.   Family Medical/Cancer History:   Family History   Problem Relation Age of Onset   Nasim Ramey Alzheimer Mother  @ 79yo   [facility] Father     Diabetes Father     Alzheimer Father     Cancer Brother         Lung ( @ 62yo)   Nasim Re Cancer Sister         Hodgkins Disease ( at 44yo)   Nasim Re Other Sister         Non-Hodkins Disease     Breast Problems Maternal Aunt       Tobacco History:  reports that she has never smoked. She has never used smokeless tobacco.  Alcohol Abuse:  reports previous alcohol use. Drug Abuse:  reports no history of drug use. Current Outpatient Medications   Medication Sig Dispense Refill    amLODIPine (NORVASC) 5 mg tablet TAKE ONE TABLET BY MOUTH DAILY WITH DINNER 90 Tab 1    lisinopriL (PRINIVIL, ZESTRIL) 40 mg tablet TAKE ONE TABLET BY MOUTH DAILY 90 Tab 1    atorvastatin (LIPITOR) 10 mg tablet TAKE ONE TABLET BY MOUTH DAILY 90 Tab 3    cyanocobalamin (Vitamin B-12) 100 mcg tablet Take 3,000 mcg by mouth daily.  carboxymethylcellulose sodium (THERATEARS OP) Apply  to eye.  OTHER JAY-MAG 1:1      TURMERIC  mg.  Lactobacillus acidophilus (PROBIOTIC PO) Take  by mouth daily. Indications: 14 strains      timolol (TIMOPTIC) 0.5 % ophthalmic solution 1 Drop two (2) times a day.  RESTASIS 0.05 % ophthalmic emulsion 1 Drop daily.   6     Allergies: Codeine, Keflex [cephalexin], and Thiazides   Social History     Socioeconomic History    Marital status:      Spouse name: Not on file    Number of children: Not on file    Years of education: Not on file    Highest education level: Not on file   Occupational History    Not on file   Tobacco Use    Smoking status: Never Smoker    Smokeless tobacco: Never Used   Vaping Use    Vaping Use: Never used   Substance and Sexual Activity    Alcohol use: Not Currently    Drug use: No    Sexual activity: Not Currently     Partners: Male     Birth control/protection: Surgical   Other Topics Concern    Not on file   Social History Narrative    Not on file     Social Determinants of Health     Financial Resource Strain:     Difficulty of Paying Living Expenses:    Food Insecurity:     Worried About Running Out of Food in the Last Year:     [address] in the Last Year:    Transportation Needs:     Lack of Transportation (Medical):      Lack of Transportation (Non-Medical):    Physical Activity:     Days of Exercise per Week:     Minutes of Exercise per Session:    Stress:     Feeling of Stress :    Social Connections:     Frequency of Communication with Friends and Family:     Frequency of Social Gatherings with Friends and Family:     Attends [employer] Services:     Active Member of Clubs or Organizations:     Attends Club or Organization Meetings:     Marital Status:    Intimate Partner Violence:     Fear of Current or Ex-Partner:     Emotionally Abused:     Physically Abused:     Sexually Abused:      Patient Active Problem List   Diagnosis Code    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    Osteoporosis M81.0    Increased serum lipids E78.5    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    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    Age-related osteoporosis without current pathological fracture Q30.4    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    HTN (hypertension), benign I10       Review of Systems - History obtained from the patient  Constitutional: negative for weight loss, fever, night sweats  HEENT: negative for hearing loss, earache, congestion, snoring, sorethroat  CV: negative for chest pain, palpitations, edema  Resp: negative for cough, shortness of breath, wheezing  GI: negative for change in bowel habits, abdominal pain, black or bloody stools  : negative for frequency, dysuria, hematuria, vaginal discharge  MSK: negative for back pain, joint pain, muscle pain  Breast: negative for breast lumps, nipple discharge, galactorrhea  Skin :negative for itching, rash, hives  Neuro: negative for dizziness, headache, confusion, weakness  Psych: negative for anxiety, depression, change in mood  Heme/lymph: negative for bleeding, bruising, pallor    Physical Exam    Visit Vitals  BP (!) 151/77   Pulse 62   Wt 111 lb (50.3 kg)   BMI 23.20 kg/m²     Constitutional  · Appearance: well-nourished, well developed, alert, in no acute distress    HENT  · Head and Face: appears normal    Neck  · Inspection/Palpation: normal appearance, no masses or tenderness  · Lymph Nodes: no lymphadenopathy present  · Thyroid: gland size normal, nontender, no nodules or masses present on palpation    Chest  · Respiratory Effort: breathing labored  · Auscultation: normal breath sounds    Cardiovascular  · Heart:  · Auscultation: regular rate and rhythm without murmur    Breasts  · Inspection of Breasts: breasts symmetrical, no skin changes, no discharge present, nipple appearance normal, no skin retraction present  · Palpation of Breasts and Axillae: no masses present on palpation, no breast tenderness  · Axillary Lymph Nodes: no lymphadenopathy present    Gastrointestinal  · Abdominal Examination: abdomen non-tender to palpation, normal bowel sounds, no masses present  · Liver and spleen: no hepatomegaly present, spleen not palpable  · Hernias: no hernias identified    Skin  · General Inspection: no rash, no lesions identified    Neurologic/Psychiatric  · Mental Status:  · Orientation: grossly oriented to person, place and time  · Mood and Affect: mood normal, affect appropriate    Genitourinary  · External Genitalia: normal appearance for age, no discharge present, no tenderness present, no inflammatory lesions present, no masses present, atrophy present  · Vagina: atrophic but otherwise normal vaginal vault without central or paravaginal defects, no discharge present, no inflammatory lesions present, no masses present  · Bladder: non-tender to palpation  · Urethra: appears normal  · Cervix: normal   · Uterus: normal size, shape and consistency  · Adnexa: no adnexal tenderness present, no adnexal masses present  · Perineum: perineum within normal limits, no evidence of trauma, no rashes or skin lesions present  · Anus: anus within normal limits, no hemorrhoids present  · Inguinal Lymph Nodes: no lymphadenopathy present    Assessment:  Routine gynecologic examination  Her current medical status is satisfactory with no evidence of significant gynecologic issues. Pap neg 2017, 2019    Plan:  Counseled re: diet, exercise, healthy lifestyle  Return for yearly wellness visits  Rec annual mammogram    Orders Placed This Encounter    PAP IG, RFX APTIMA HPV ASCUS ([id number])     Standing Status:   Future     Number of Occurrences:   1     Standing Expiration Date:   6/2/2022     Order Specific Question:   Pap Source? Answer:   Cervical and Endocervical     Order Specific Question:   Total Hysterectomy? Answer:   No     Order Specific Question:   Supracervical Hysterectomy? Answer:   No     Order Specific Question:   Post Menopausal?     Answer:   Yes     Order Specific Question:   Hormone Therapy? Answer:   No     Order Specific Question:   IUD? Answer:   No     Order Specific Question:   Abnormal Bleeding? Answer:   No     Order Specific Question:   Pregnant     Answer:   No     Order Specific Question:   Post Partum? Answer:   No       Pap done. If neg, then OK to exit routine screening.

## 2021-06-02 ENCOUNTER — OFFICE VISIT (OUTPATIENT)
Dept: OBGYN CLINIC | Age: 71
End: 2021-06-02
Payer: MEDICARE

## 2021-06-02 VITALS
SYSTOLIC BLOOD PRESSURE: 151 MMHG | BODY MASS INDEX: 23.2 KG/M2 | HEART RATE: 62 BPM | DIASTOLIC BLOOD PRESSURE: 77 MMHG | WEIGHT: 111 LBS

## 2021-06-02 DIAGNOSIS — Z01.419 ENCOUNTER FOR WELL WOMAN EXAM WITH ROUTINE GYNECOLOGICAL EXAM: Primary | ICD-10-CM

## 2021-06-02 PROCEDURE — G8754 DIAS BP LESS 90: HCPCS | Performed by: OBSTETRICS & GYNECOLOGY

## 2021-06-02 PROCEDURE — G8420 CALC BMI NORM PARAMETERS: HCPCS | Performed by: OBSTETRICS & GYNECOLOGY

## 2021-06-02 PROCEDURE — G9899 SCRN MAM PERF RSLTS DOC: HCPCS | Performed by: OBSTETRICS & GYNECOLOGY

## 2021-06-02 PROCEDURE — 3017F COLORECTAL CA SCREEN DOC REV: CPT | Performed by: OBSTETRICS & GYNECOLOGY

## 2021-06-02 PROCEDURE — G0101 CA SCREEN;PELVIC/BREAST EXAM: HCPCS | Performed by: OBSTETRICS & GYNECOLOGY

## 2021-06-02 PROCEDURE — G8432 DEP SCR NOT DOC, RNG: HCPCS | Performed by: OBSTETRICS & GYNECOLOGY

## 2021-06-02 PROCEDURE — 1101F PT FALLS ASSESS-DOCD LE1/YR: CPT | Performed by: OBSTETRICS & GYNECOLOGY

## 2021-06-02 PROCEDURE — G8753 SYS BP > OR = 140: HCPCS | Performed by: OBSTETRICS & GYNECOLOGY

## 2021-06-02 PROCEDURE — 1090F PRES/ABSN URINE INCON ASSESS: CPT | Performed by: OBSTETRICS & GYNECOLOGY

## 2021-07-06 ENCOUNTER — HOSPITAL ENCOUNTER (OUTPATIENT)
Dept: MAMMOGRAPHY | Age: 71
Discharge: HOME | End: 2021-07-06
Attending: INTERNAL MEDICINE
Payer: MEDICARE

## 2021-07-06 DIAGNOSIS — Z78.0 MENOPAUSE: ICD-10-CM

## 2021-07-06 PROCEDURE — 77080 DXA BONE DENSITY AXIAL: CPT

## 2021-07-08 ENCOUNTER — PATIENT MESSAGE (OUTPATIENT)
Dept: INTERNAL MEDICINE CLINIC | Age: 71
End: 2021-07-08

## 2021-07-08 DIAGNOSIS — M81.0 AGE-RELATED OSTEOPOROSIS WITHOUT CURRENT PATHOLOGICAL FRACTURE: Primary | ICD-10-CM

## 2021-07-29 NOTE — TELEPHONE ENCOUNTER
From: Cole Hebert MD  To: Federico Rodriguez  Sent: 7/8/2021 6:17 PM EDT  Subject: dexa    You have lost a little more bone strength    Still a candidate for prolia    Come in to arrange if you are interested

## 2021-08-13 ENCOUNTER — APPOINTMENT (OUTPATIENT)
Dept: CARDIAC REHAB | Age: 71
End: 2021-08-13
Attending: INTERNAL MEDICINE

## 2021-08-24 ENCOUNTER — HOSPITAL ENCOUNTER (OUTPATIENT)
Dept: NUTRITION | Age: 71
Discharge: HOME | End: 2021-08-24

## 2021-08-24 NOTE — PROGRESS NOTES
[id number] [facility]     Nutrition Assessment  Medical Nutrition Therapy   Outpatient Initial Evaluation         Patient Name: Stanislav Sarmiento : 1950   Treatment Diagnosis: M81.0 (ICD-10-CM) - Age-related osteoporosis without current pathological fracture   Referral Source: Trinidad Andrade MD [facility]): 2021     In time:   1030am             Out time:   1130am   Total Treatment Time (min):   60     Gender: female Age: 66 y.o. 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 , Disp: , Rfl:     carboxymethylcellulose sodium (THERATEARS OP), Apply  to eye., Disp: , Rfl:     OTHER, JAY-MAG 1:1, Disp: , Rfl:     TURMERIC PO, 800 mg., Disp: , Rfl:     Lactobacillus acidophilus (PROBIOTIC PO), Take  by mouth daily. Indications: 14 strains, Disp: , Rfl:     timolol (TIMOPTIC) 0.5 % ophthalmic solution, 1 Drop two (2) times a day., Disp: , Rfl:     RESTASIS 0.05 % ophthalmic emulsion, 1 Drop daily. , Disp: , Rfl: 6     Biochemical Data:   No results found for: HBA1C, THE7PHUA, YXW8EGHJ  Lab Results   Component Value Date/Time Sodium 141 05/19/2021 10:15 AM    Potassium 4.5 05/19/2021 10:15 AM    Chloride 105 05/19/2021 10:15 AM    CO2 34 (H) 05/19/2021 10:15 AM    Anion gap 2 (L) 05/19/2021 10:15 AM    Glucose 103 (H) 05/19/2021 10:15 AM    BUN 10 05/19/2021 10:15 AM    Creatinine 0.72 05/19/2021 10:15 AM    BUN/Creatinine ratio 14 05/19/2021 10:15 AM    GFR est AA >60 05/19/2021 10:15 AM    GFR est non-AA >60 05/19/2021 10:15 AM    Calcium 9.7 05/19/2021 10:15 AM    Bilirubin, total 0.7 05/19/2021 10:15 AM    Alk. phosphatase 80 05/19/2021 10:15 AM    Protein, total 7.2 05/19/2021 10:15 AM    Albumin 4.0 05/19/2021 10:15 AM    Globulin 3.2 05/19/2021 10:15 AM    A-G Ratio 1.3 05/19/2021 10:15 AM    ALT (SGPT) 32 05/19/2021 10:15 AM    AST (SGOT) 22 05/19/2021 10:15 AM     Lab Results   Component Value Date/Time    Cholesterol, total 185 05/19/2021 10:15 AM    HDL Cholesterol 94 05/19/2021 10:15 AM    LDL, calculated 78.2 05/19/2021 10:15 AM    VLDL, calculated 12.8 05/19/2021 10:15 AM    Triglyceride 64 05/19/2021 10:15 AM    CHOL/HDL Ratio 2.0 05/19/2021 10:15 AM        Assessment:   Pt is a 67 yo female seen today for nutrition counseling r/t osteoporosis. Pt also has hx of HTN and hyperlipidemia. Recent lab results show values WNL. Pt has been working hard to improve her diet over the past month. Pt reports purposeful weight loss of 5# and beginning to read package labels and be more mindful about food choices. Pt daughter has been guiding her in these changes thus far and pt has been receiving other sources of information from the internet. Pt presented many questions today about supplements, foods, and how they relate to her current medical conditions. Pt reports that she loves salt but has cut out using salt and adding salt to foods. Pt also noted that she was baking a lot during covid but has also reduced baking due to not knowing how to adjust recipes to make them healthier.  Pt remains active by walking daily and has recently purchased a trampoline. Pt lives alone and is preparing meals for herself. In terms of purchasing foods pt has been relying on online ordering and delivery. Pt top priority is to slow osteoporosis and reduce blood pressure. BP Readings from Last 3 Encounters:   06/02/21 (!) 151/77   05/19/21 (!) 145/70   03/29/21 (!) 140/81          Food & Nutrition: B- Oatmeal w/ blueberries and nuts; smoothie w/ greek yogurt   S-   L- Broccoli soup and salad  S-   D- Cabbage Soup   S-   Drinks: Water, Juice     Trying to do more plant based diet but still enjoys chicken and fish  Pt has cut out milk and cheeses; reviewed that biggest concerns are fats/cholesterol content and sodium. Pt may try skim milk d/t sodium content of the almondmilk that she purchased. Pt has been avoiding salt and sugar added to foods       Estimate Needs:    Equation( [x] MSJ ; []  HBE; [] Telma Givens; [] other)  * Activity Factor (1.3) +/- 0   Calories: 1200  Protein: 60 Carbs: 150 Fat: 40   Kcal/day  g/day  g/day  g/day        percent: 20  50  30               Nutrition Diagnosis Food and nutrition related knowledge deficit R/T lack of prior education for nutrition and osteoporosis and HTN AEB pt questions during session. Nutrition Intervention &  Education: Educated pt on the pathophysiology of osteoporosis and HTN along with the rationale for dietary modifications. Educated pt on lean proteins, healthy fats, non-starchy vegetables, and complex carbohydrate food sources. Discussed label reading, meal timing, and appropriate serving sizes. Reviewed needs of calcium and vitamin D along with limiting sodium intake for Osteoporosis. Educated on foods rich in calcium and Vitamin D, review of protein food sources. Reviewed current supplements and upper limits. Educated on recommendations of fat-soluble and water-soluble vitamins and when to take them.     Handouts Provided: []  Carbohydrates  [x]  Protein  []  Non-starchy Vegatbles  []  Food Label  []  Meal and Snack Ideas  []  Food Journals []  Diabetes  []  Cholesterol  []  Sodium  []  Gen Nutr Guidelines  []  SBGM Guidelines  [x]  Others: AND Osteoporosis Nutrition Therapy    Information Reviewed with: Pt    Readiness to Change Stage: []  Pre-contemplative    []  Contemplative  []  Preparation               [x]  Action                  []  Maintenance   Potential Barriers to Learning: []  Decline in memory    []  Language barrier   []  Other:  []  Emotional                  []  Limited mobility     [x]  None  []  Lack of motivation     [] Vision, hearing or cognitive impairment   Expected Compliance: Pt expressed comprehension, high motivation, and compliance is expected. Nutritional Goal - To promote lifestyle changes to result in:    []  Weight loss  []  Improved diabetic control  []  Decreased cholesterol levels  [x]  Decreased blood pressure  []  Weight maintenance []  Preventing any interactions associated with food allergies  []  Adequate weight gain toward goal weight  [x]  Other:  Slow progression of osteoporosis        Patient Goals:   - Have at least 1 serving of protein food with each meal (3x daily)   - Track foods daily for accountability and reference  - Continue to read food labels for portion sizes, sodium, added sugars, and fat content.      Dietitian Signature: Herb Gomez RDN Date: 8/24/2021   Follow-up: 4 weeks  Time: 1030 AM

## 2021-08-25 ENCOUNTER — OFFICE VISIT (OUTPATIENT)
Dept: INTERNAL MEDICINE CLINIC | Age: 71
End: 2021-08-25
Payer: MEDICARE

## 2021-08-25 VITALS
WEIGHT: 105 LBS | RESPIRATION RATE: 16 BRPM | HEIGHT: 58 IN | DIASTOLIC BLOOD PRESSURE: 60 MMHG | OXYGEN SATURATION: 99 % | SYSTOLIC BLOOD PRESSURE: 138 MMHG | HEART RATE: 79 BPM | TEMPERATURE: 98.3 F | BODY MASS INDEX: 22.04 KG/M2

## 2021-08-25 DIAGNOSIS — I10 HTN (HYPERTENSION), BENIGN: Primary | ICD-10-CM

## 2021-08-25 PROCEDURE — G8427 DOCREV CUR MEDS BY ELIG CLIN: HCPCS | Performed by: INTERNAL MEDICINE

## 2021-08-25 PROCEDURE — G8754 DIAS BP LESS 90: HCPCS | Performed by: INTERNAL MEDICINE

## 2021-08-25 PROCEDURE — G8510 SCR DEP NEG, NO PLAN REQD: HCPCS | Performed by: INTERNAL MEDICINE

## 2021-08-25 PROCEDURE — G0463 HOSPITAL OUTPT CLINIC VISIT: HCPCS | Performed by: INTERNAL MEDICINE

## 2021-08-25 PROCEDURE — G8752 SYS BP LESS 140: HCPCS | Performed by: INTERNAL MEDICINE

## 2021-08-25 PROCEDURE — G8420 CALC BMI NORM PARAMETERS: HCPCS | Performed by: INTERNAL MEDICINE

## 2021-08-25 PROCEDURE — 99213 OFFICE O/P EST LOW 20 MIN: CPT | Performed by: INTERNAL MEDICINE

## 2021-08-25 PROCEDURE — G8536 NO DOC ELDER MAL SCRN: HCPCS | Performed by: INTERNAL MEDICINE

## 2021-08-25 PROCEDURE — G9899 SCRN MAM PERF RSLTS DOC: HCPCS | Performed by: INTERNAL MEDICINE

## 2021-08-25 PROCEDURE — 3017F COLORECTAL CA SCREEN DOC REV: CPT | Performed by: INTERNAL MEDICINE

## 2021-08-25 PROCEDURE — 1101F PT FALLS ASSESS-DOCD LE1/YR: CPT | Performed by: INTERNAL MEDICINE

## 2021-08-25 PROCEDURE — 1090F PRES/ABSN URINE INCON ASSESS: CPT | Performed by: INTERNAL MEDICINE

## 2021-08-25 RX ORDER — AMLODIPINE BESYLATE 2.5 MG/1
2.5 TABLET ORAL DAILY
Qty: 90 TABLET | Refills: 1 | Status: SHIPPED | OUTPATIENT
Start: 2021-08-25 | End: 2021-12-02

## 2021-08-25 NOTE — PROGRESS NOTES
1. Have you been to the ER, urgent care clinic since your last visit? Hospitalized since your last visit? No    2. Have you seen or consulted any other health care providers outside of the [address] [place] [address] since your last visit? Include any pap smears or colon screening. Yes  Chief Complaint   Patient presents with    Medication Evaluation     There are no preventive care reminders to display for this patient.

## 2021-08-25 NOTE — PROGRESS NOTES
Subjective:   Stanislav Sarmiento is a 66 y.o. female with hypertension. Current Outpatient Medications   Medication Sig Dispense Refill    amLODIPine (NORVASC) 2.5 mg tablet Take 1 Tablet by mouth daily. 90 Tablet 1    atorvastatin (LIPITOR) 10 mg tablet TAKE ONE TABLET BY MOUTH DAILY 90 Tab 3    carboxymethylcellulose sodium (THERATEARS OP) Apply  to eye.  TURMERIC  mg.  Lactobacillus acidophilus (PROBIOTIC PO) Take  by mouth daily. Indications: 14 strains      RESTASIS 0.05 % ophthalmic emulsion 1 Drop daily. 6    cyanocobalamin (Vitamin B-12) 100 mcg tablet Take 3,000 mcg by mouth daily.  OTHER JAY-MAG 1:1      timolol (TIMOPTIC) 0.5 % ophthalmic solution 1 Drop two (2) times a day. Hypertension ROS: taking medications as instructed, side effects noted by patient include orthostatic lightheadedness, no TIA's, no chest pain on exertion, no dyspnea on exertion, no swelling of ankles. New concerns: on very good diet with no processed food and vegetarian for 3 weeks and walks 3 miles per day. Objective:   Visit Vitals  /60   Pulse 79   Temp 98.3 °F (36.8 °C) (Temporal)   Resp 16   Ht 4' 10\" (1.473 m)   Wt 105 lb (47.6 kg)   SpO2 99%   BMI 21.95 kg/m²      Appearance alert, well appearing, and in no distress, oriented to person, place, and time and normal appearing weight. General exam BP noted to be well controlled today in office, S1, S2 normal, no gallop, no murmur, chest clear, no JVD, no HSM, no edema. Lab review: labs are reviewed, up to date and normal.     Assessment:    Hypertension stable, improved. Control with less meds as diet and exercise working better  Plan:   the following changes are made - see orders same diet    Requested Prescriptions     Signed Prescriptions Disp Refills    amLODIPine (NORVASC) 2.5 mg tablet 90 Tablet 1     Sig: Take 1 Tablet by mouth daily. Compa Escobar DASH diet advised and information given for lifestyle adjustment  May be able to lower BP 10 points with this diet

## 2021-12-02 ENCOUNTER — OFFICE VISIT (OUTPATIENT)
Dept: INTERNAL MEDICINE CLINIC | Age: 71
End: 2021-12-02
Payer: MEDICARE

## 2021-12-02 VITALS
WEIGHT: 106 LBS | HEART RATE: 71 BPM | BODY MASS INDEX: 22.25 KG/M2 | HEIGHT: 58 IN | DIASTOLIC BLOOD PRESSURE: 69 MMHG | RESPIRATION RATE: 20 BRPM | OXYGEN SATURATION: 97 % | SYSTOLIC BLOOD PRESSURE: 133 MMHG | TEMPERATURE: 98.7 F

## 2021-12-02 DIAGNOSIS — I10 HTN (HYPERTENSION), BENIGN: Primary | ICD-10-CM

## 2021-12-02 PROCEDURE — 1101F PT FALLS ASSESS-DOCD LE1/YR: CPT | Performed by: INTERNAL MEDICINE

## 2021-12-02 PROCEDURE — 1090F PRES/ABSN URINE INCON ASSESS: CPT | Performed by: INTERNAL MEDICINE

## 2021-12-02 PROCEDURE — G9899 SCRN MAM PERF RSLTS DOC: HCPCS | Performed by: INTERNAL MEDICINE

## 2021-12-02 PROCEDURE — G8427 DOCREV CUR MEDS BY ELIG CLIN: HCPCS | Performed by: INTERNAL MEDICINE

## 2021-12-02 PROCEDURE — G8754 DIAS BP LESS 90: HCPCS | Performed by: INTERNAL MEDICINE

## 2021-12-02 PROCEDURE — G8420 CALC BMI NORM PARAMETERS: HCPCS | Performed by: INTERNAL MEDICINE

## 2021-12-02 PROCEDURE — G8510 SCR DEP NEG, NO PLAN REQD: HCPCS | Performed by: INTERNAL MEDICINE

## 2021-12-02 PROCEDURE — G8536 NO DOC ELDER MAL SCRN: HCPCS | Performed by: INTERNAL MEDICINE

## 2021-12-02 PROCEDURE — 3017F COLORECTAL CA SCREEN DOC REV: CPT | Performed by: INTERNAL MEDICINE

## 2021-12-02 PROCEDURE — G0463 HOSPITAL OUTPT CLINIC VISIT: HCPCS | Performed by: INTERNAL MEDICINE

## 2021-12-02 PROCEDURE — 99213 OFFICE O/P EST LOW 20 MIN: CPT | Performed by: INTERNAL MEDICINE

## 2021-12-02 PROCEDURE — G8752 SYS BP LESS 140: HCPCS | Performed by: INTERNAL MEDICINE

## 2021-12-02 RX ORDER — AMLODIPINE BESYLATE 5 MG/1
5 TABLET ORAL DAILY
Qty: 90 TABLET | Refills: 1 | Status: SHIPPED | OUTPATIENT
Start: 2021-12-02 | End: 2022-08-02

## 2021-12-02 RX ORDER — LATANOPROST 50 UG/ML
SOLUTION/ DROPS OPHTHALMIC
COMMUNITY
Start: 2021-10-07

## 2021-12-02 NOTE — PROGRESS NOTES
Subjective:   Tae Martinez is a 66 y.o. female with hypertension. Current Outpatient Medications   Medication Sig Dispense Refill    latanoprost (XALATAN) 0.005 % ophthalmic solution       amLODIPine (NORVASC) 2.5 mg tablet Take 1 Tablet by mouth daily. 90 Tablet 1    atorvastatin (LIPITOR) 10 mg tablet TAKE ONE TABLET BY MOUTH DAILY 90 Tab 3    carboxymethylcellulose sodium (THERATEARS OP) Apply  to eye.  TURMERIC  mg.  Lactobacillus acidophilus (PROBIOTIC PO) Take  by mouth daily. Indications: 14 strains      RESTASIS 0.05 % ophthalmic emulsion 1 Drop daily. 6      Hypertension ROS: taking medications as instructed, side effects noted by patient include orthostatic lightheadedness, no TIA's, no chest pain on exertion, no dyspnea on exertion, no swelling of ankles. New concerns: on very good diet with no processed food and vegetarian for 3 weeks and walks 3 miles per day. kya 2.5 amlodipine1   Sometimes high though    Objective:   Visit Vitals  /69   Pulse 71   Temp 98.7 °F (37.1 °C) (Temporal)   Resp 20   Ht 4' 10\" (1.473 m)   Wt 106 lb (48.1 kg)   SpO2 97%   BMI 22.15 kg/m²      Appearance alert, well appearing, and in no distress, oriented to person, place, and time and normal appearing weight. General exam BP noted to be well controlled today in office, S1, S2 normal, no gallop, no murmur, chest clear, no JVD, no HSM, no edema. Lab review: labs are reviewed, up to date and normal.     Assessment:    Hypertension stable, improved. Control with less meds as diet and exercise working better  Plan:   the following changes are made - see orders same diet    Requested Prescriptions     Signed Prescriptions Disp Refills    amLODIPine (NORVASC) 5 mg tablet 90 Tablet 1     Sig: Take 1 Tablet by mouth daily. Tanya Ramesh DASH diet advised and information given for lifestyle adjustment  May be able to lower BP 10 points with this diet  Try 5 mg amlodipine

## 2021-12-02 NOTE — PROGRESS NOTES
Chief Complaint   Patient presents with    Follow-up     1. Have you been to the ER, urgent care clinic since your last visit? Hospitalized since your last visit? No    2. Have you seen or consulted any other health care providers outside of the [address] [place] [address] since your last visit? Include any pap smears or colon screening.  No    Visit Vitals  /69   Pulse 71   Temp 98.7 °F (37.1 °C) (Temporal)   Resp 20   Ht 4' 10\" (1.473 m)   Wt 106 lb (48.1 kg)   SpO2 97%   BMI 22.15 kg/m²

## 2021-12-06 ENCOUNTER — HOSPITAL ENCOUNTER (OUTPATIENT)
Dept: MAMMOGRAPHY | Age: 71
Discharge: HOME | End: 2021-12-06
Attending: INTERNAL MEDICINE
Payer: MEDICARE

## 2021-12-06 DIAGNOSIS — Z12.31 BREAST CANCER SCREENING BY MAMMOGRAM: ICD-10-CM

## 2021-12-06 PROCEDURE — 77067 SCR MAMMO BI INCL CAD: CPT

## 2021-12-06 PROCEDURE — 77063 BREAST TOMOSYNTHESIS BI: CPT

## 2022-02-18 RX ORDER — ATORVASTATIN CALCIUM 10 MG/1
TABLET, FILM COATED ORAL
Qty: 90 TABLET | Refills: 3 | Status: SHIPPED | OUTPATIENT
Start: 2022-02-18 | End: 2022-05-12

## 2022-03-18 PROBLEM — L66.11: Status: ACTIVE | Noted: 2019-08-29

## 2022-03-18 PROBLEM — L66.1: Status: ACTIVE | Noted: 2019-08-29

## 2022-03-19 PROBLEM — M81.0 AGE-RELATED OSTEOPOROSIS WITHOUT CURRENT PATHOLOGICAL FRACTURE: Status: ACTIVE | Noted: 2017-08-22

## 2022-03-19 PROBLEM — H40.052 OCULAR HYPERTENSION OF LEFT EYE: Status: ACTIVE | Noted: 2017-08-22

## 2022-03-20 PROBLEM — I10 HTN (HYPERTENSION), BENIGN: Status: ACTIVE | Noted: 2019-12-30

## 2022-05-11 ENCOUNTER — OFFICE VISIT (OUTPATIENT)
Dept: INTERNAL MEDICINE CLINIC | Age: 72
End: 2022-05-11
Payer: MEDICARE

## 2022-05-11 VITALS
RESPIRATION RATE: 20 BRPM | BODY MASS INDEX: 22.46 KG/M2 | OXYGEN SATURATION: 99 % | WEIGHT: 107 LBS | DIASTOLIC BLOOD PRESSURE: 64 MMHG | SYSTOLIC BLOOD PRESSURE: 131 MMHG | HEART RATE: 63 BPM | HEIGHT: 58 IN | TEMPERATURE: 98.6 F

## 2022-05-11 DIAGNOSIS — M81.0 AGE-RELATED OSTEOPOROSIS WITHOUT CURRENT PATHOLOGICAL FRACTURE: ICD-10-CM

## 2022-05-11 DIAGNOSIS — E55.9 VITAMIN D DEFICIENCY: ICD-10-CM

## 2022-05-11 DIAGNOSIS — Z13.39 SCREENING FOR ALCOHOLISM: ICD-10-CM

## 2022-05-11 DIAGNOSIS — I10 HTN (HYPERTENSION), BENIGN: ICD-10-CM

## 2022-05-11 DIAGNOSIS — Z00.00 MEDICARE ANNUAL WELLNESS VISIT, SUBSEQUENT: ICD-10-CM

## 2022-05-11 DIAGNOSIS — E78.2 MIXED HYPERLIPIDEMIA: Primary | ICD-10-CM

## 2022-05-11 PROCEDURE — G8510 SCR DEP NEG, NO PLAN REQD: HCPCS | Performed by: INTERNAL MEDICINE

## 2022-05-11 PROCEDURE — G9899 SCRN MAM PERF RSLTS DOC: HCPCS | Performed by: INTERNAL MEDICINE

## 2022-05-11 PROCEDURE — G0442 ANNUAL ALCOHOL SCREEN 15 MIN: HCPCS | Performed by: INTERNAL MEDICINE

## 2022-05-11 PROCEDURE — G8536 NO DOC ELDER MAL SCRN: HCPCS | Performed by: INTERNAL MEDICINE

## 2022-05-11 PROCEDURE — 1101F PT FALLS ASSESS-DOCD LE1/YR: CPT | Performed by: INTERNAL MEDICINE

## 2022-05-11 PROCEDURE — G0439 PPPS, SUBSEQ VISIT: HCPCS | Performed by: INTERNAL MEDICINE

## 2022-05-11 PROCEDURE — G8420 CALC BMI NORM PARAMETERS: HCPCS | Performed by: INTERNAL MEDICINE

## 2022-05-11 PROCEDURE — 1090F PRES/ABSN URINE INCON ASSESS: CPT | Performed by: INTERNAL MEDICINE

## 2022-05-11 PROCEDURE — 3017F COLORECTAL CA SCREEN DOC REV: CPT | Performed by: INTERNAL MEDICINE

## 2022-05-11 PROCEDURE — G0463 HOSPITAL OUTPT CLINIC VISIT: HCPCS | Performed by: INTERNAL MEDICINE

## 2022-05-11 PROCEDURE — 99214 OFFICE O/P EST MOD 30 MIN: CPT | Performed by: INTERNAL MEDICINE

## 2022-05-11 PROCEDURE — G8754 DIAS BP LESS 90: HCPCS | Performed by: INTERNAL MEDICINE

## 2022-05-11 PROCEDURE — G8427 DOCREV CUR MEDS BY ELIG CLIN: HCPCS | Performed by: INTERNAL MEDICINE

## 2022-05-11 PROCEDURE — G8752 SYS BP LESS 140: HCPCS | Performed by: INTERNAL MEDICINE

## 2022-05-11 RX ORDER — MELATONIN
1000 DAILY
COMMUNITY

## 2022-05-11 RX ORDER — MULTIVITAMIN
1 TABLET ORAL DAILY
COMMUNITY

## 2022-05-11 NOTE — PROGRESS NOTES
1. \"Have you been to the ER, urgent care clinic since your last visit? Hospitalized since your last visit? \"  No    2. \"Have you seen or consulted any other health care providers outside of the [address] [place] [address] since your last visit? \"  Yes, Neurology    3. For patients aged 39-70: Has the patient had a colonoscopy / FIT/ Cologuard? No      If the patient is female:    4. For patients aged 41-77: Has the patient had a mammogram within the past 2 years? No      5. For patients aged 21-65: Has the patient had a pap smear? No    Health Maintenance Due   Topic Date Due    Medicare Yearly Exam  11/17/2021    Lipid Screen  05/19/2022     Patient here for Annual Medicare Wellness. Patient has been informed of any other discussion outside the parameters of the physical could result in other charges including a co pay, patient verbalized understanding.

## 2022-05-11 NOTE — PROGRESS NOTES
This is the Subsequent Medicare Annual Wellness Exam, performed 12 months or more after the Initial AWV or the last Subsequent AWV    I have reviewed the patient's medical history in detail and updated the computerized patient record. Assessment/Plan   Education and counseling provided:  Are appropriate based on today's review and evaluation  End-of-Life planning (with patient's consent)    1. Medicare annual wellness visit, subsequent  2. Screening for alcoholism  -     MD ANNUAL ALCOHOL SCREEN 15 MIN       Depression Risk Factor Screening     3 most recent PHQ Screens 5/11/2022   PHQ Not Done -   Little interest or pleasure in doing things Not at all   Feeling down, depressed, irritable, or hopeless Not at all   Total Score PHQ 2 0   Trouble falling or staying asleep, or sleeping too much Not at all   Feeling tired or having little energy Not at all   Poor appetite, weight loss, or overeating Not at all   Feeling bad about yourself - or that you are a failure or have let yourself or your family down Not at all   Trouble concentrating on things such as school, work, reading, or watching TV Not at all   Moving or speaking so slowly that other people could have noticed; or the opposite being so fidgety that others notice Not at all   Thoughts of being better off dead, or hurting yourself in some way Not at all   PHQ 9 Score 0   How difficult have these problems made it for you to do your work, take care of your home and get along with others Not difficult at all       Alcohol & Drug Abuse Risk Screen   Do you average more than 1 drink per night or more than 7 drinks a week?: No  On any one occasion in the past three months have you had more than 3 drinks containing alcohol?: No            Functional Ability and Level of Safety   Hearing:  Hearing: Patient reports hearing is good  Hearing comments: (P) The background noise on the TV makes it difficult for me to hear clearly.   I bought a special speaker to clarify speech. Activities of Daily Living: The home contains: no safety equipment  Functional ADLs: Patient does total self care     Ambulation:  Patient ambulates: with no difficulty     Fall Risk:  Fall Risk Assessment, last 12 mths 5/11/2022   Able to walk? Yes   Fall in past 12 months? 0   Do you feel unsteady?  0   Are you worried about falling 0     Abuse Screen:  Do you ever feel afraid of your partner?: (P) No  Are you in a relationship with someone who physically or mentally threatens you?: (P) No  Is it safe for you to go home?: (P) Yes        Cognitive Screening   Has your family/caregiver stated any concerns about your memory?: No     Cognitive Screening: Normal - Verbal Fluency Test    Health Maintenance Due     Health Maintenance Due   Topic Date Due    Lipid Screen  05/19/2022       Patient Care Team   Patient Care Team:  Silvano Hernandez MD as PCP - General (Internal Medicine Physician)  Silvano Hernandez MD as PCP - [facility] Empaneled Provider  Rowdy Garcia MD (Ophthalmology)  James Hope MD (Neurology)  Brittany Lindsay MD (Obstetrics & Gynecology)  Jennifer Wang MD as Physician (Sleep Medicine Physician)    History     Patient Active Problem List   Diagnosis Code    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    Osteoporosis M81.0    Increased serum lipids E78.5    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    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    Age-related osteoporosis without current pathological fracture Z34.0    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 [place], [place] HX CATARACT REMOVAL  2020    cataract/glaucoma surgery    HX  SECTION      x2    HX ORTHOPAEDIC Left 1990s    trigger finger surgery    HX TONSILLECTOMY      at age 32    HX TUBAL LIGATION      90s; L/S     Current Outpatient Medications   Medication Sig Dispense Refill    atorvastatin (LIPITOR) 10 mg tablet TAKE ONE TABLET BY MOUTH DAILY 90 Tablet 3    latanoprost (XALATAN) 0.005 % ophthalmic solution       amLODIPine (NORVASC) 5 mg tablet Take 1 Tablet by mouth daily. 90 Tablet 1    carboxymethylcellulose sodium (THERATEARS OP) Apply  to eye.  TURMERIC  mg.  Lactobacillus acidophilus (PROBIOTIC PO) Take  by mouth daily. Indications: 14 strains      RESTASIS 0.05 % ophthalmic emulsion 1 Drop daily. 6     Allergies   Allergen Reactions    Codeine Nausea and Vomiting    Keflex [Cephalexin] Nausea and Vomiting    Thiazides Unable to Obtain       Family History   Problem Relation Age of Onset    Alzheimer's Disease Mother          @ 79yo    Heart Disease Father     Diabetes Father     Alzheimer's Disease Father     Cancer Brother         Lung ( @ 62yo)   Del Castillo Cancer Sister         Hodgkins Disease ( at 44yo)   Del Castillo Other Sister         Non-Hodkins Disease     Breast Problems Maternal Aunt      Social History     Tobacco Use    Smoking status: Never Smoker    Smokeless tobacco: Never Used   Substance Use Topics    Alcohol use: Not Currently        SUBJECTIVE: Melodie Marrero is a 66 y.o. female seen for a follow up visit; she has hypertension, hyperlipidemia and no known cardiovascular conditions.   Current Outpatient Medications   Medication Sig Dispense Refill    atorvastatin (LIPITOR) 10 mg tablet TAKE ONE TABLET BY MOUTH DAILY 90 Tablet 3    latanoprost (XALATAN) 0.005 % ophthalmic solution       amLODIPine (NORVASC) 5 mg tablet Take 1 Tablet by mouth daily. 90 Tablet 1    carboxymethylcellulose sodium (THERATEARS OP) Apply  to eye.  TURMERIC  mg.  Lactobacillus acidophilus (PROBIOTIC PO) Take  by mouth daily. Indications: 14 strains      RESTASIS 0.05 % ophthalmic emulsion 1 Drop daily. 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 New concerns: exercising more not on osteoporosis meds at her request  Has pushd supplements. OBJECTIVE:  Visit Vitals  /64   Pulse 63   Temp 98.6 °F (37 °C) (Temporal)   Resp 20   Ht 4' 10\" (1.473 m)   Wt 107 lb (48.5 kg)   SpO2 99%   BMI 22.36 kg/m²      Appearance: alert, well appearing, and in no distress, oriented to person, place, and time and normal appearing weight. General exam: CVS exam BP noted to be well controlled today in office, S1, S2 normal, no gallop, no murmur, chest clear, no JVD, no HSM, no edema. Lab review: orders written for new lab studies as appropriate; see orders, no lab studies available for review at time of visit. ASSESSMENT:  hypertension stable, hyperlipidemia stable, asymptomatic, osteoporosis and vit D  levels.     PLAN:  current treatment plan is effective, no change in therapy  lab results and schedule of future lab studies reviewed with patient  repeat labs ordered prior to next appointment  orders and follow up as documented in patient record  reviewed diet, exercise and weight control  covid booster advised 4th. 1. Medicare annual wellness visit, subsequent  covid    2. Screening for alcoholism  None at all    Reviewed this  - HI ANNUAL ALCOHOL SCREEN 15 MIN    3. Mixed hyperlipidemia  On statin follow    4. HTN (hypertension), benign  Hypertension controlled for age based on guidelines    - CBC WITH AUTOMATED DIFF; Future  - LIPID PANEL; Future  - METABOLIC PANEL, COMPREHENSIVE; Future    5. Vitamin D deficiency  Follow  With labs  - VITAMIN D, 25 HYDROXY; Future    6.  Age-related osteoporosis without current pathological fracture  No falls still at risk    Kimberly Meehan MD

## 2022-05-11 NOTE — PATIENT INSTRUCTIONS

## 2022-05-12 ENCOUNTER — TELEPHONE (OUTPATIENT)
Dept: INTERNAL MEDICINE CLINIC | Age: 72
End: 2022-05-12

## 2022-05-12 LAB
25(OH)D3 SERPL-MCNC: 35.5 NG/ML (ref 30–100)
ALBUMIN SERPL-MCNC: 4.1 G/DL (ref 3.5–5)
ALBUMIN/GLOB SERPL: 1.3 {RATIO} (ref 1.1–2.2)
ALP SERPL-CCNC: 75 U/L (ref 45–117)
ALT SERPL-CCNC: 32 U/L (ref 12–78)
ANION GAP SERPL CALC-SCNC: 4 MMOL/L (ref 5–15)
AST SERPL-CCNC: 25 U/L (ref 15–37)
BASOPHILS # BLD: 0 K/UL (ref 0–0.1)
BASOPHILS NFR BLD: 1 % (ref 0–1)
BILIRUB SERPL-MCNC: 0.9 MG/DL (ref 0.2–1)
BUN SERPL-MCNC: 14 MG/DL (ref 6–20)
BUN/CREAT SERPL: 20 (ref 12–20)
CALCIUM SERPL-MCNC: 9.6 MG/DL (ref 8.5–10.1)
CHLORIDE SERPL-SCNC: 104 MMOL/L (ref 97–108)
CHOLEST SERPL-MCNC: 217 MG/DL
CO2 SERPL-SCNC: 32 MMOL/L (ref 21–32)
CREAT SERPL-MCNC: 0.7 MG/DL (ref 0.55–1.02)
DIFFERENTIAL METHOD BLD: NORMAL
EOSINOPHIL # BLD: 0.1 K/UL (ref 0–0.4)
EOSINOPHIL NFR BLD: 2 % (ref 0–7)
ERYTHROCYTE [DISTWIDTH] IN BLOOD BY AUTOMATED COUNT: 12.3 % (ref 11.5–14.5)
GLOBULIN SER CALC-MCNC: 3.1 G/DL (ref 2–4)
GLUCOSE SERPL-MCNC: 102 MG/DL (ref 65–100)
HCT VFR BLD AUTO: 43 % (ref 35–47)
HDLC SERPL-MCNC: 97 MG/DL
HDLC SERPL: 2.2 {RATIO} (ref 0–5)
HGB BLD-MCNC: 13.8 G/DL (ref 11.5–16)
IMM GRANULOCYTES # BLD AUTO: 0 K/UL (ref 0–0.04)
IMM GRANULOCYTES NFR BLD AUTO: 0 % (ref 0–0.5)
LDLC SERPL CALC-MCNC: 107 MG/DL (ref 0–100)
LYMPHOCYTES # BLD: 1.2 K/UL (ref 0.8–3.5)
LYMPHOCYTES NFR BLD: 26 % (ref 12–49)
MCH RBC QN AUTO: 31.2 PG (ref 26–34)
MCHC RBC AUTO-ENTMCNC: 32.1 G/DL (ref 30–36.5)
MCV RBC AUTO: 97.3 FL (ref 80–99)
MONOCYTES # BLD: 0.4 K/UL (ref 0–1)
MONOCYTES NFR BLD: 8 % (ref 5–13)
NEUTS SEG # BLD: 2.9 K/UL (ref 1.8–8)
NEUTS SEG NFR BLD: 63 % (ref 32–75)
NRBC # BLD: 0 K/UL (ref 0–0.01)
NRBC BLD-RTO: 0 PER 100 WBC
PLATELET # BLD AUTO: 180 K/UL (ref 150–400)
PMV BLD AUTO: 9.9 FL (ref 8.9–12.9)
POTASSIUM SERPL-SCNC: 4.6 MMOL/L (ref 3.5–5.1)
PROT SERPL-MCNC: 7.2 G/DL (ref 6.4–8.2)
RBC # BLD AUTO: 4.42 M/UL (ref 3.8–5.2)
SODIUM SERPL-SCNC: 140 MMOL/L (ref 136–145)
TRIGL SERPL-MCNC: 65 MG/DL (ref ?–150)
VLDLC SERPL CALC-MCNC: 13 MG/DL
WBC # BLD AUTO: 4.5 K/UL (ref 3.6–11)

## 2022-05-12 RX ORDER — ATORVASTATIN CALCIUM 20 MG/1
20 TABLET, FILM COATED ORAL DAILY
Qty: 90 TABLET | Refills: 1 | Status: SHIPPED | OUTPATIENT
Start: 2022-05-12

## 2022-08-02 RX ORDER — AMLODIPINE BESYLATE 5 MG/1
TABLET ORAL
Qty: 90 TABLET | Refills: 1 | Status: SHIPPED | OUTPATIENT
Start: 2022-08-02

## 2022-10-18 ENCOUNTER — NURSE TRIAGE (OUTPATIENT)
Dept: OTHER | Facility: CLINIC | Age: 72
End: 2022-10-18

## 2022-10-18 NOTE — TELEPHONE ENCOUNTER
Location of patient: [address] [place] from [facility] with [employer]. Subjective: Caller states \"Left arm pain\"     Current Symptoms: Left arm pain  History of high cholesterol;  Arm pain a couple years ago and did testing and was negative; Anxiety; Doesn't take b/p meds consistently; Went off diet; Feels pulsing at the top of her head;  139/82 b/p today    Onset: 2 days ago; sudden    Associated Symptoms: NA    Pain Severity: 0/10; 8/10 pain at night;     Temperature: denies     What has been tried: ASA, massage    Recommended disposition: See in Office Today or Tomorrow    Care advice provided, patient verbalizes understanding; denies any other questions or concerns; instructed to call back for any new or worsening symptoms. Patient/Caller agrees with recommended disposition; writer provided warm transfer to Arin Adamson at [facility] for appointment scheduling    Attention Provider: Thank you for allowing me to participate in the care of your patient. The patient was connected to triage in response to information provided to the [facility]. Please do not respond through this encounter as the response is not directed to a shared pool.     Reason for Disposition   Patient wants to be seen    Protocols used: Arm Pain-ADULT-[place]

## 2022-10-19 ENCOUNTER — OFFICE VISIT (OUTPATIENT)
Dept: INTERNAL MEDICINE CLINIC | Age: 72
End: 2022-10-19
Payer: MEDICARE

## 2022-10-19 VITALS
BODY MASS INDEX: 22.75 KG/M2 | OXYGEN SATURATION: 99 % | HEIGHT: 58 IN | RESPIRATION RATE: 18 BRPM | HEART RATE: 75 BPM | DIASTOLIC BLOOD PRESSURE: 68 MMHG | TEMPERATURE: 98.6 F | SYSTOLIC BLOOD PRESSURE: 142 MMHG | WEIGHT: 108.4 LBS

## 2022-10-19 DIAGNOSIS — R07.89 ATYPICAL CHEST PAIN: Primary | ICD-10-CM

## 2022-10-19 PROCEDURE — 99213 OFFICE O/P EST LOW 20 MIN: CPT | Performed by: INTERNAL MEDICINE

## 2022-10-19 PROCEDURE — G0463 HOSPITAL OUTPT CLINIC VISIT: HCPCS | Performed by: INTERNAL MEDICINE

## 2022-10-19 PROCEDURE — G9899 SCRN MAM PERF RSLTS DOC: HCPCS | Performed by: INTERNAL MEDICINE

## 2022-10-19 PROCEDURE — 1101F PT FALLS ASSESS-DOCD LE1/YR: CPT | Performed by: INTERNAL MEDICINE

## 2022-10-19 PROCEDURE — G8432 DEP SCR NOT DOC, RNG: HCPCS | Performed by: INTERNAL MEDICINE

## 2022-10-19 PROCEDURE — G8420 CALC BMI NORM PARAMETERS: HCPCS | Performed by: INTERNAL MEDICINE

## 2022-10-19 PROCEDURE — G8536 NO DOC ELDER MAL SCRN: HCPCS | Performed by: INTERNAL MEDICINE

## 2022-10-19 PROCEDURE — G8753 SYS BP > OR = 140: HCPCS | Performed by: INTERNAL MEDICINE

## 2022-10-19 PROCEDURE — 3017F COLORECTAL CA SCREEN DOC REV: CPT | Performed by: INTERNAL MEDICINE

## 2022-10-19 PROCEDURE — 93005 ELECTROCARDIOGRAM TRACING: CPT | Performed by: INTERNAL MEDICINE

## 2022-10-19 PROCEDURE — 93010 ELECTROCARDIOGRAM REPORT: CPT | Performed by: INTERNAL MEDICINE

## 2022-10-19 PROCEDURE — G8427 DOCREV CUR MEDS BY ELIG CLIN: HCPCS | Performed by: INTERNAL MEDICINE

## 2022-10-19 PROCEDURE — G8754 DIAS BP LESS 90: HCPCS | Performed by: INTERNAL MEDICINE

## 2022-10-19 PROCEDURE — 1090F PRES/ABSN URINE INCON ASSESS: CPT | Performed by: INTERNAL MEDICINE

## 2022-11-07 ENCOUNTER — TRANSCRIBE ORDER (OUTPATIENT)
Dept: SCHEDULING | Age: 72
End: 2022-11-07

## 2022-11-07 DIAGNOSIS — Z12.31 OTHER SCREENING MAMMOGRAM: Primary | ICD-10-CM

## 2022-11-09 ENCOUNTER — OFFICE VISIT (OUTPATIENT)
Dept: INTERNAL MEDICINE CLINIC | Age: 72
End: 2022-11-09
Payer: MEDICARE

## 2022-11-09 VITALS
SYSTOLIC BLOOD PRESSURE: 156 MMHG | HEART RATE: 70 BPM | BODY MASS INDEX: 22.88 KG/M2 | HEIGHT: 58 IN | OXYGEN SATURATION: 99 % | WEIGHT: 109 LBS | DIASTOLIC BLOOD PRESSURE: 73 MMHG | TEMPERATURE: 98.5 F

## 2022-11-09 DIAGNOSIS — F41.0 PANIC ATTACK: Primary | ICD-10-CM

## 2022-11-09 PROCEDURE — G0463 HOSPITAL OUTPT CLINIC VISIT: HCPCS | Performed by: INTERNAL MEDICINE

## 2022-11-09 PROCEDURE — 3017F COLORECTAL CA SCREEN DOC REV: CPT | Performed by: INTERNAL MEDICINE

## 2022-11-09 PROCEDURE — G8510 SCR DEP NEG, NO PLAN REQD: HCPCS | Performed by: INTERNAL MEDICINE

## 2022-11-09 PROCEDURE — 1090F PRES/ABSN URINE INCON ASSESS: CPT | Performed by: INTERNAL MEDICINE

## 2022-11-09 PROCEDURE — G8754 DIAS BP LESS 90: HCPCS | Performed by: INTERNAL MEDICINE

## 2022-11-09 PROCEDURE — G8753 SYS BP > OR = 140: HCPCS | Performed by: INTERNAL MEDICINE

## 2022-11-09 PROCEDURE — G8536 NO DOC ELDER MAL SCRN: HCPCS | Performed by: INTERNAL MEDICINE

## 2022-11-09 PROCEDURE — G8427 DOCREV CUR MEDS BY ELIG CLIN: HCPCS | Performed by: INTERNAL MEDICINE

## 2022-11-09 PROCEDURE — 99213 OFFICE O/P EST LOW 20 MIN: CPT | Performed by: INTERNAL MEDICINE

## 2022-11-09 PROCEDURE — 1101F PT FALLS ASSESS-DOCD LE1/YR: CPT | Performed by: INTERNAL MEDICINE

## 2022-11-09 PROCEDURE — G8420 CALC BMI NORM PARAMETERS: HCPCS | Performed by: INTERNAL MEDICINE

## 2022-11-09 PROCEDURE — G9899 SCRN MAM PERF RSLTS DOC: HCPCS | Performed by: INTERNAL MEDICINE

## 2022-11-09 RX ORDER — ATORVASTATIN CALCIUM 20 MG/1
TABLET, FILM COATED ORAL
Qty: 90 TABLET | Refills: 1 | Status: SHIPPED | OUTPATIENT
Start: 2022-11-09

## 2022-11-09 RX ORDER — ESCITALOPRAM OXALATE 5 MG/1
5 TABLET ORAL DAILY
Qty: 30 TABLET | Refills: 2 | Status: SHIPPED | OUTPATIENT
Start: 2022-11-09

## 2022-11-09 RX ORDER — OMEGA-3S/DHA/EPA/FISH OIL 1000-1400
CAPSULE,DELAYED RELEASE (ENTERIC COATED) ORAL
COMMUNITY

## 2022-11-09 RX ORDER — BISMUTH SUBSALICYLATE 262 MG
1 TABLET,CHEWABLE ORAL DAILY
COMMUNITY

## 2022-11-09 NOTE — PROGRESS NOTES
Chief Complaint   Patient presents with    Follow-up     6 month     Hadley Shepard is a 74 y.o. female with the following Problems and Medications. 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      multivitamin (ONE A DAY) tablet Take 1 Tablet by mouth daily. amLODIPine (NORVASC) 5 mg tablet TAKE ONE TABLET BY MOUTH DAILY 90 Tablet 1    atorvastatin (LIPITOR) 20 mg tablet Take 1 Tablet by mouth daily. 90 Tablet 1    cholecalciferol (VITAMIN D3) (1000 Units /25 mcg) tablet Take 1,000 Units by mouth daily. calcium-cholecalciferol, D3, (CALTRATE 600+D) tablet Take 1 Tablet by mouth daily. Indications: post-menopausal osteoporosis prevention      latanoprost (XALATAN) 0.005 % ophthalmic solution       carboxymethylcellulose sodium (THERATEARS OP) Apply  to eye. TURMERIC  mg. Lactobacillus acidophilus (PROBIOTIC PO) Take  by mouth daily. Indications: 14 strains      RESTASIS 0.05 % ophthalmic emulsion 1 Drop daily. (Patient not taking: Reported on 11/9/2022)  6     Severe anxiety with   panic attacks   Worried   Not depressed  Not suicidal    Vitals:    11/09/22 1006   BP: (!) 156/73   Pulse: 70   Temp: 98.5 °F (36.9 °C)   TempSrc: Temporal   SpO2: 99%   Weight: 109 lb (49.4 kg)   Height: 4' 10\" (1.473 m)     Anxious      1. Panic attack  Will try for 6 to 8 weeks then re evaluate  - escitalopram oxalate (LEXAPRO) 5 mg tablet; Take 1 Tablet by mouth daily. Dispense: 30 Tablet;  Refill: 2

## 2022-11-09 NOTE — PROGRESS NOTES
Identified pt with two pt identifiers. Reviewed record in preparation for visit and have obtained necessary documentation. All patient medications has been reviewed. Chief Complaint   Patient presents with    Follow-up     6 month     Additional information about chief complaint:    Visit Vitals  BP (!) 156/73 (BP 1 Location: Left upper arm, BP Patient Position: Sitting, BP Cuff Size: Adult)   Pulse 70   Temp 98.5 °F (36.9 °C) (Temporal)   Ht 4' 10\" (1.473 m)   Wt 109 lb (49.4 kg)   SpO2 99%   BMI 22.78 kg/m²       There are no preventive care reminders to display for this patient. 1.Have you been to the ER, urgent care clinic since your last visit? Hospitalized since your last visit? No    2. Have you seen or consulted any other health care providers outside of the [address] [place] [address] since your last visit? Include any pap smears or colon screening.  No

## 2023-01-27 RX ORDER — AMLODIPINE BESYLATE 5 MG/1
TABLET ORAL
Qty: 90 TABLET | Refills: 1 | Status: SHIPPED | OUTPATIENT
Start: 2023-01-27

## 2023-02-21 ENCOUNTER — HOSPITAL ENCOUNTER (OUTPATIENT)
Dept: MAMMOGRAPHY | Age: 73
Discharge: HOME | End: 2023-02-21
Attending: INTERNAL MEDICINE
Payer: MEDICARE

## 2023-02-21 DIAGNOSIS — Z12.31 OTHER SCREENING MAMMOGRAM: ICD-10-CM

## 2023-02-21 PROCEDURE — 77063 BREAST TOMOSYNTHESIS BI: CPT

## 2023-03-17 ENCOUNTER — TELEPHONE (OUTPATIENT)
Dept: INTERNAL MEDICINE CLINIC | Age: 73
End: 2023-03-17

## 2023-05-09 SDOH — ECONOMIC STABILITY: FOOD INSECURITY: WITHIN THE PAST 12 MONTHS, THE FOOD YOU BOUGHT JUST DIDN'T LAST AND YOU DIDN'T HAVE MONEY TO GET MORE.: NEVER TRUE

## 2023-05-09 SDOH — ECONOMIC STABILITY: FOOD INSECURITY: WITHIN THE PAST 12 MONTHS, YOU WORRIED THAT YOUR FOOD WOULD RUN OUT BEFORE YOU GOT MONEY TO BUY MORE.: NEVER TRUE

## 2023-05-09 SDOH — ECONOMIC STABILITY: INCOME INSECURITY: HOW HARD IS IT FOR YOU TO PAY FOR THE VERY BASICS LIKE FOOD, HOUSING, MEDICAL CARE, AND HEATING?: NOT HARD AT ALL

## 2023-05-09 SDOH — ECONOMIC STABILITY: TRANSPORTATION INSECURITY
IN THE PAST 12 MONTHS, HAS LACK OF TRANSPORTATION KEPT YOU FROM MEETINGS, WORK, OR FROM GETTING THINGS NEEDED FOR DAILY LIVING?: NO

## 2023-05-09 SDOH — ECONOMIC STABILITY: HOUSING INSECURITY
IN THE LAST 12 MONTHS, WAS THERE A TIME WHEN YOU DID NOT HAVE A STEADY PLACE TO SLEEP OR SLEPT IN A SHELTER (INCLUDING NOW)?: NO

## 2023-05-11 ENCOUNTER — OFFICE VISIT (OUTPATIENT)
Age: 73
End: 2023-05-11
Payer: MEDICARE

## 2023-05-11 VITALS
BODY MASS INDEX: 22.88 KG/M2 | RESPIRATION RATE: 12 BRPM | DIASTOLIC BLOOD PRESSURE: 82 MMHG | TEMPERATURE: 98.3 F | HEART RATE: 72 BPM | SYSTOLIC BLOOD PRESSURE: 143 MMHG | WEIGHT: 109 LBS | HEIGHT: 58 IN | OXYGEN SATURATION: 99 %

## 2023-05-11 DIAGNOSIS — K22.70 BARRETT'S ESOPHAGUS WITHOUT DYSPLASIA: ICD-10-CM

## 2023-05-11 DIAGNOSIS — Z78.0 MENOPAUSE: ICD-10-CM

## 2023-05-11 DIAGNOSIS — E78.5 INCREASED SERUM LIPIDS: ICD-10-CM

## 2023-05-11 DIAGNOSIS — I10 HTN (HYPERTENSION), BENIGN: ICD-10-CM

## 2023-05-11 DIAGNOSIS — M81.0 AGE-RELATED OSTEOPOROSIS WITHOUT CURRENT PATHOLOGICAL FRACTURE: Primary | ICD-10-CM

## 2023-05-11 PROCEDURE — 1090F PRES/ABSN URINE INCON ASSESS: CPT | Performed by: INTERNAL MEDICINE

## 2023-05-11 PROCEDURE — 3078F DIAST BP <80 MM HG: CPT | Performed by: INTERNAL MEDICINE

## 2023-05-11 PROCEDURE — G8420 CALC BMI NORM PARAMETERS: HCPCS | Performed by: INTERNAL MEDICINE

## 2023-05-11 PROCEDURE — 3017F COLORECTAL CA SCREEN DOC REV: CPT | Performed by: INTERNAL MEDICINE

## 2023-05-11 PROCEDURE — 1123F ACP DISCUSS/DSCN MKR DOCD: CPT | Performed by: INTERNAL MEDICINE

## 2023-05-11 PROCEDURE — 1036F TOBACCO NON-USER: CPT | Performed by: INTERNAL MEDICINE

## 2023-05-11 PROCEDURE — 3077F SYST BP >= 140 MM HG: CPT | Performed by: INTERNAL MEDICINE

## 2023-05-11 PROCEDURE — G8399 PT W/DXA RESULTS DOCUMENT: HCPCS | Performed by: INTERNAL MEDICINE

## 2023-05-11 PROCEDURE — 99214 OFFICE O/P EST MOD 30 MIN: CPT | Performed by: INTERNAL MEDICINE

## 2023-05-11 PROCEDURE — G8427 DOCREV CUR MEDS BY ELIG CLIN: HCPCS | Performed by: INTERNAL MEDICINE

## 2023-05-11 RX ORDER — AMLODIPINE BESYLATE 5 MG/1
1 TABLET ORAL DAILY
COMMUNITY
Start: 2023-01-27

## 2023-05-11 RX ORDER — ATORVASTATIN CALCIUM 20 MG/1
1 TABLET, FILM COATED ORAL DAILY
COMMUNITY
Start: 2022-11-09 | End: 2023-05-12

## 2023-05-11 RX ORDER — CYCLOSPORINE 0.5 MG/ML
1 EMULSION OPHTHALMIC DAILY
COMMUNITY
Start: 2015-12-31

## 2023-05-11 RX ORDER — LATANOPROST 50 UG/ML
SOLUTION/ DROPS OPHTHALMIC
COMMUNITY
Start: 2023-04-16

## 2023-05-11 RX ORDER — CALCIUM CARBONATE/VITAMIN D3 600 MG-10
1 TABLET ORAL DAILY
COMMUNITY

## 2023-05-11 ASSESSMENT — PATIENT HEALTH QUESTIONNAIRE - PHQ9
SUM OF ALL RESPONSES TO PHQ QUESTIONS 1-9: 0
SUM OF ALL RESPONSES TO PHQ9 QUESTIONS 1 & 2: 0
2. FEELING DOWN, DEPRESSED OR HOPELESS: 0
SUM OF ALL RESPONSES TO PHQ QUESTIONS 1-9: 0
SUM OF ALL RESPONSES TO PHQ QUESTIONS 1-9: 0
1. LITTLE INTEREST OR PLEASURE IN DOING THINGS: 0
SUM OF ALL RESPONSES TO PHQ QUESTIONS 1-9: 0

## 2023-05-11 NOTE — PROGRESS NOTES
Subjective:   Boom Carmichael is a 66 y.o. female with hypertension. Current Outpatient Medications   Medication Sig Dispense Refill    latanoprost (XALATAN) 0.005 % ophthalmic solution       amLODIPine (NORVASC) 2.5 mg tablet Take 1 Tablet by mouth daily. 90 Tablet 1    atorvastatin (LIPITOR) 10 mg tablet TAKE ONE TABLET BY MOUTH DAILY 90 Tab 3    carboxymethylcellulose sodium (THERATEARS OP) Apply  to eye. TURMERIC  mg. Lactobacillus acidophilus (PROBIOTIC PO) Take  by mouth daily. Indications: 14 strains      RESTASIS 0.05 % ophthalmic emulsion 1 Drop daily. 6      Hypertension ROS: taking medications as instructed, side effects noted by patient include orthostatic lightheadedness, no TIA's, no chest pain on exertion, no dyspnea on exertion, no swelling of ankles. New concerns: on very good diet with no processed food and vegetarian for 3 weeks and walks 3 miles per day. janeth 2.5 amlodipine1   Sometimes high though  less anxiety stopped lexapro  Pulse low all the time 139/59 at home today  Objective:   Visit Vitals  /69   Pulse 71   Temp 98.7 °F (37.1 °C) (Temporal)   Resp 20   Ht 4' 10\" (1.473 m)   Wt 106 lb (48.1 kg)   SpO2 97%   BMI 22.15 kg/m²    Weight - Scale: 109 lb (49.4 kg)   Wt Readings from Last 3 Encounters:   05/11/23 109 lb (49.4 kg)   11/09/22 109 lb (49.4 kg)   10/19/22 108 lb 6.4 oz (49.2 kg)       Appearance alert, well appearing, and in no distress, oriented to person, place, and time and normal appearing weight. General exam BP noted to be well controlled today in office, S1, S2 normal, no gallop, no murmur, chest clear, no JVD, no HSM, no edema. Lab review: labs are reviewed, up to date and normal.     Assessment:    Hypertension stable, improved.    Control with less meds as diet and exercise working better  Plan:   the following changes are made - see orders same diet    Requested Prescriptions     Signed Prescriptions Disp Refills    amLODIPine (NORVASC) 5 mg

## 2023-05-12 RX ORDER — ATORVASTATIN CALCIUM 20 MG/1
TABLET, FILM COATED ORAL
Qty: 90 TABLET | Refills: 3 | Status: SHIPPED | OUTPATIENT
Start: 2023-05-12

## 2023-05-25 RX ORDER — LATANOPROST 50 UG/ML
SOLUTION/ DROPS OPHTHALMIC
COMMUNITY
Start: 2021-10-07

## 2023-06-15 ENCOUNTER — ANESTHESIA EVENT (OUTPATIENT)
Facility: HOSPITAL | Age: 73
End: 2023-06-15
Payer: MEDICARE

## 2023-06-15 ENCOUNTER — HOSPITAL ENCOUNTER (OUTPATIENT)
Facility: HOSPITAL | Age: 73
Setting detail: OUTPATIENT SURGERY
Discharge: HOME | End: 2023-06-15
Attending: INTERNAL MEDICINE | Admitting: INTERNAL MEDICINE
Payer: MEDICARE

## 2023-06-15 ENCOUNTER — ANESTHESIA (OUTPATIENT)
Facility: HOSPITAL | Age: 73
End: 2023-06-15
Payer: MEDICARE

## 2023-06-15 VITALS
DIASTOLIC BLOOD PRESSURE: 91 MMHG | WEIGHT: 108.91 LBS | BODY MASS INDEX: 22.86 KG/M2 | TEMPERATURE: 97.9 F | HEIGHT: 58 IN | SYSTOLIC BLOOD PRESSURE: 139 MMHG | HEART RATE: 66 BPM | OXYGEN SATURATION: 100 % | RESPIRATION RATE: 13 BRPM

## 2023-06-15 LAB
ALBUMIN SERPL-MCNC: 4.3 G/DL (ref 3.5–5)
ALBUMIN/GLOB SERPL: 1.4 (ref 1.1–2.2)
ALP SERPL-CCNC: 74 U/L (ref 45–117)
ALT SERPL-CCNC: 34 U/L (ref 12–78)
ANION GAP SERPL CALC-SCNC: 0 MMOL/L (ref 5–15)
AST SERPL-CCNC: 28 U/L (ref 15–37)
BASOPHILS # BLD: 0 K/UL (ref 0–0.1)
BASOPHILS NFR BLD: 1 % (ref 0–1)
BILIRUB SERPL-MCNC: 1.2 MG/DL (ref 0.2–1)
BUN SERPL-MCNC: 10 MG/DL (ref 6–20)
BUN/CREAT SERPL: 14 (ref 12–20)
CALCIUM SERPL-MCNC: 9.8 MG/DL (ref 8.5–10.1)
CHLORIDE SERPL-SCNC: 106 MMOL/L (ref 97–108)
CHOLEST SERPL-MCNC: 169 MG/DL
CO2 SERPL-SCNC: 32 MMOL/L (ref 21–32)
CREAT SERPL-MCNC: 0.69 MG/DL (ref 0.55–1.02)
DIFFERENTIAL METHOD BLD: NORMAL
EOSINOPHIL # BLD: 0.1 K/UL (ref 0–0.4)
EOSINOPHIL NFR BLD: 2 % (ref 0–7)
ERYTHROCYTE [DISTWIDTH] IN BLOOD BY AUTOMATED COUNT: 12.1 % (ref 11.5–14.5)
GLOBULIN SER CALC-MCNC: 3.1 G/DL (ref 2–4)
GLUCOSE SERPL-MCNC: 103 MG/DL (ref 65–100)
HCT VFR BLD AUTO: 41.8 % (ref 35–47)
HDLC SERPL-MCNC: 111 MG/DL
HDLC SERPL: 1.5 (ref 0–5)
HGB BLD-MCNC: 13.3 G/DL (ref 11.5–16)
IMM GRANULOCYTES # BLD AUTO: 0 K/UL (ref 0–0.04)
IMM GRANULOCYTES NFR BLD AUTO: 0 % (ref 0–0.5)
LDLC SERPL CALC-MCNC: 47 MG/DL (ref 0–100)
LYMPHOCYTES # BLD: 1 K/UL (ref 0.8–3.5)
LYMPHOCYTES NFR BLD: 23 % (ref 12–49)
MCH RBC QN AUTO: 30.5 PG (ref 26–34)
MCHC RBC AUTO-ENTMCNC: 31.8 G/DL (ref 30–36.5)
MCV RBC AUTO: 95.9 FL (ref 80–99)
MONOCYTES # BLD: 0.4 K/UL (ref 0–1)
MONOCYTES NFR BLD: 9 % (ref 5–13)
NEUTS SEG # BLD: 2.8 K/UL (ref 1.8–8)
NEUTS SEG NFR BLD: 65 % (ref 32–75)
NRBC # BLD: 0 K/UL (ref 0–0.01)
NRBC BLD-RTO: 0 PER 100 WBC
PLATELET # BLD AUTO: 174 K/UL (ref 150–400)
PMV BLD AUTO: 10.3 FL (ref 8.9–12.9)
POTASSIUM SERPL-SCNC: 4.6 MMOL/L (ref 3.5–5.1)
PROT SERPL-MCNC: 7.4 G/DL (ref 6.4–8.2)
RBC # BLD AUTO: 4.36 M/UL (ref 3.8–5.2)
SODIUM SERPL-SCNC: 138 MMOL/L (ref 136–145)
TRIGL SERPL-MCNC: 55 MG/DL
VLDLC SERPL CALC-MCNC: 11 MG/DL
WBC # BLD AUTO: 4.3 K/UL (ref 3.6–11)

## 2023-06-15 PROCEDURE — 2580000003 HC RX 258: Performed by: INTERNAL MEDICINE

## 2023-06-15 PROCEDURE — 6360000002 HC RX W HCPCS: Performed by: NURSE ANESTHETIST, CERTIFIED REGISTERED

## 2023-06-15 PROCEDURE — 88305 TISSUE EXAM BY PATHOLOGIST: CPT

## 2023-06-15 PROCEDURE — 88342 IMHCHEM/IMCYTCHM 1ST ANTB: CPT

## 2023-06-15 PROCEDURE — 7100000010 HC PHASE II RECOVERY - FIRST 15 MIN: Performed by: INTERNAL MEDICINE

## 2023-06-15 PROCEDURE — 3600007502: Performed by: INTERNAL MEDICINE

## 2023-06-15 PROCEDURE — 7100000011 HC PHASE II RECOVERY - ADDTL 15 MIN: Performed by: INTERNAL MEDICINE

## 2023-06-15 PROCEDURE — 3700000000 HC ANESTHESIA ATTENDED CARE: Performed by: INTERNAL MEDICINE

## 2023-06-15 PROCEDURE — 2709999900 HC NON-CHARGEABLE SUPPLY: Performed by: INTERNAL MEDICINE

## 2023-06-15 PROCEDURE — 3700000001 HC ADD 15 MINUTES (ANESTHESIA): Performed by: INTERNAL MEDICINE

## 2023-06-15 PROCEDURE — 3600007512: Performed by: INTERNAL MEDICINE

## 2023-06-15 RX ORDER — PROPOFOL 10 MG/ML
INJECTION, EMULSION INTRAVENOUS PRN
Status: DISCONTINUED | OUTPATIENT
Start: 2023-06-15 | End: 2023-06-15 | Stop reason: SDUPTHER

## 2023-06-15 RX ORDER — SODIUM CHLORIDE 9 MG/ML
INJECTION, SOLUTION INTRAVENOUS CONTINUOUS
Status: DISCONTINUED | OUTPATIENT
Start: 2023-06-15 | End: 2023-06-15 | Stop reason: HOSPADM

## 2023-06-15 RX ORDER — LIDOCAINE HYDROCHLORIDE 20 MG/ML
INJECTION, SOLUTION INTRAVENOUS PRN
Status: DISCONTINUED | OUTPATIENT
Start: 2023-06-15 | End: 2023-06-15 | Stop reason: SDUPTHER

## 2023-06-15 RX ADMIN — SODIUM CHLORIDE: 9 INJECTION, SOLUTION INTRAVENOUS at 10:28

## 2023-06-15 RX ADMIN — LIDOCAINE HYDROCHLORIDE 20 MG: 20 INJECTION, SOLUTION INTRAVENOUS at 10:34

## 2023-06-15 RX ADMIN — PROPOFOL 50 MG: 10 INJECTION, EMULSION INTRAVENOUS at 10:35

## 2023-06-15 RX ADMIN — PROPOFOL 20 MG: 10 INJECTION, EMULSION INTRAVENOUS at 10:38

## 2023-06-15 ASSESSMENT — PAIN - FUNCTIONAL ASSESSMENT: PAIN_FUNCTIONAL_ASSESSMENT: NONE - DENIES PAIN

## 2023-06-15 NOTE — ANESTHESIA PRE PROCEDURE
Department of Anesthesiology  Preprocedure Note       Name:  Dang Paez   Age:  74 y.o.  :  1950                                          MRN:  [id number]         Date:  6/15/2023      Surgeon: Shanell Zaldivar):  Holden Evans MD    Procedure: Procedure(s):  EGD    Medications prior to admission:   Prior to Admission medications    Medication Sig Start Date End Date Taking? Authorizing Provider   Multiple Vitamins-Minerals (MULTIVITAMIN WOMEN 50+) TABS Take 1 tablet by mouth daily   Yes Historical Provider, MD   Omega-3 Fatty Acids (FISH OIL PO) Take 2 capsules by mouth daily   Yes Historical Provider, MD   Calcium Carbonate-Vitamin D (CALCIUM-VITAMIN D3 PO) Take by mouth daily 1 tsbp   Yes Historical Provider, MD   Probiotic Product (PROBIOTIC PO) Take 1 capsule by mouth daily   Yes Historical Provider, MD   TURMERIC PO Take 800 mg by mouth daily    Ar Automatic Reconciliation   latanoprost (XALATAN) 0.005 % ophthalmic solution ceived the following from [facility]: Outside name: latanoprost (XALATAN) 0.005 % ophthalmic solution 10/7/21   Ar Automatic Reconciliation   atorvastatin (LIPITOR) 20 MG tablet TAKE ONE TABLET BY MOUTH DAILY 23   Kenyetta Moore MD   amLODIPine (NORVASC) 5 MG tablet Take 1 tablet by mouth daily 23   Historical Provider, MD   cycloSPORINE (RESTASIS) 0.05 % ophthalmic emulsion 1 drop daily 12/31/15   Historical Provider, MD   calcium carb-cholecalciferol 600-10 MG-MCG TABS per tab Take 1 tablet by mouth daily    Historical Provider, MD       Current medications:    Current Facility-Administered Medications   Medication Dose Route Frequency Provider Last Rate Last Admin    0.9 % sodium chloride infusion   IntraVENous Continuous Holden Evans MD           Allergies:     Allergies   Allergen Reactions    Thiazide-Type Diuretics Other (See Comments)    Cephalexin Nausea And Vomiting    Codeine Nausea And Vomiting       Problem List:    Patient Active Problem List

## 2023-06-15 NOTE — H&P
The patient is a 74year old female who presents with a complaint of Brothers's esophagus. The patient presents for routine follow-up (Pt with history of Barretts esophagus who presents for screening EGD. She has been feeling well. She is not taking any acid suppression. She exercises regularly and follows an organic diet. ). There has been no associated heartburn, chest pain, dysphagia, nausea or vomiting. Previous diagnostic tests have included EGD (2019.). Problem List/Past Medical (Abram Milton; 2022 9:45 AM)  Brothers's esophagus (K22.70)    Personal history of colonic polyps (Z86.010)    GERD (gastroesophageal reflux disease) (K21.9)    Brothers's Esophagus    Arthritis    Blepharochalasis (H02.30)    Ocular hypertension (H40.059)    Dry eyes (N05.618)    Hypercholesterolemia    Hypertension      Past Surgical History Abram Milton; 2022 9:45 AM)  Tonsillectomy    Tubal Ligation     Delivery   x2    Allergies (Abram Milton; 2022 9:45 AM)  Keflex *CEPHALOSPORINS*   Vomiting. Codeine/Codeine Derivatives   Vomiting. Medication History (Abram Milton; 2022 9:46 AM)  amLODIPine Besylate  (5MG Tablet, Oral) Active. Atorvastatin Calcium  (20MG Tablet, Oral) Active. Latanoprost  (0.005% Solution, Ophthalmic) Active. Lisinopril  (20MG Tablet, 1 Oral daily) Active. Medications Reconciled     Family History (Abram Milton; 2022 9:45 AM)  Non Hodgkin's lymphoma (C85.90)   Sister. Lung Cancer   Father. age 71 passed  Hodgkin's Disease   Sister. age 44 passed  Alzheimer's disease   Mother. Heart attack (I21.9)   Father. Colon Polyps   Sister. Social History (Abram Milton; 2022 9:45 AM)  Blood Transfusion   No.  Employment status   Retired. Tobacco Use   Never smoker. Marital status   . Alcohol Use   Occasional alcohol use, Drinks wine.     Diagnostic Studies History Abram Milton; 2022 9:45

## 2023-06-15 NOTE — PERIOP NOTE
Samyariana Cordero  1950  [id number]    Situation:  Verbal report received from: MARYBETH Hill   Procedure: Procedure(s):  EGD wiith BIOPSY    Background:    Preoperative diagnosis: Brothers's esophagus without dysplasia [K22.70]  Postoperative diagnosis: * No post-op diagnosis entered *    :  Dr. Neeta York  Assistant(s): Circulator: Olan Landau, RN  Endoscopy Technician: Izabel Roe LPN    Specimens:   ID Type Source Tests Collected by Time Destination   A : [address], MD 6/15/2023 1036      H. Pylori  No    Assessment:  Intra-procedure medications       Anesthesia gave intra-procedure sedation and medications, see anesthesia flow sheet Yes    Intravenous fluids: NS@ KVO     Vital signs stable yes. Abdominal assessment: round and soft yes    Recommendation:  Discharge patient per MD orderyes . Return to floorn/a. Family or Friend yes.    Permission to share finding with family or friend Yes

## 2023-06-15 NOTE — PERIOP NOTE
Initial RN admission and assessment performed and documented in Endoscopy navigator. Patient evaluated by anesthesia in pre-procedure holding. All procedural vital signs, airway assessment, and level of consciousness information monitored and recorded by anesthesia staff on the anesthesia record. Report received from [address] [place] [address] post procedure. Patient transported to recovery area by RN. Report given to post procedure RNLeny was pre-cleaned at bedside immediately following procedure by Adali.

## 2023-06-15 NOTE — PERIOP NOTE
Patient verbalized understanding of discharge instructions, iv removed per policy. Patient denies pain, skin intact.

## 2023-06-15 NOTE — DISCHARGE INSTRUCTIONS
Sanford Blankenship M.D.  [phone]           6/15/2023  Gelacio Alicea  :  1950  [facility] Medical Record Number:  [id number]        ENDOSCOPY FINDINGS:   Your endoscopy showed a small size hiatal hernia and minimal changes from acid reflux. Otherwise looked within normal. Biopsies were obtained. EGD DISCHARGE INSTRUCTIONS    DISCOMFORT:  Sore throat- throat lozenges or warm salt water gargle  redness at IV site- apply warm compress to area; if redness or soreness persist- contact your physician  Gaseous discomfort- walking, belching will help relieve any discomfort  You may not operate a vehicle for 12 hours  You may not engage in an occupation involving machinery or appliances for rest of today  You may not drink alcoholic beverages for at least 12 hours  Avoid making any critical decisions for at least 24 hour    DIET:   You may resume your regular diet. ACTIVITY  Spend the remainder of the day resting -  avoid any strenuous activity. Avoid driving or operating machinery. CALL M.D. ANY SIGN OF   Increasing pain, nausea, vomiting  Abdominal distension (swelling)  New increased bleeding (oral or rectal)  Fever (chills)  Pain in chest area  Bloody discharge from nose or mouth  Shortness of breath    Follow-up Instructions:   Call Dr. Garrison Valle for any questions or problems. Telephone # [phone]  Biopsies were obtained, the results will be available  in  5 to 7 days. We will call you to notify you of these results. Continue same medications. Follow up as needed.

## 2023-06-15 NOTE — ANESTHESIA POSTPROCEDURE EVALUATION
Department of Anesthesiology  Postprocedure Note    Patient: Ruffin Kocher  MRN: [id number]  YOB: 1950  Date of evaluation: 6/15/2023      Procedure Summary     Date: 06/15/23 Room / Location: [facility] ENDO 03 / [facility] ENDOSCOPY    Anesthesia Start: 1028 Anesthesia Stop: 1044    Procedures:       EGD with BIOPSY (Upper GI Region)      EGD DIAGNOSTIC ONLY (Upper GI Region) Diagnosis:       Brothers's esophagus without dysplasia      (Brothers's esophagus without dysplasia [K22.70])    Surgeons: Meaghan Riggs MD Responsible Provider: Maribel Garrett MD    Anesthesia Type: MAC ASA Status: 2          Anesthesia Type: No value filed.     Odalys Phase I: Odalys Score: 10    Odalys Phase II: Odalys Score: 10      Anesthesia Post Evaluation    Patient location during evaluation: PACU  Patient participation: complete - patient participated  Level of consciousness: awake and awake and alert  Pain score: 0  Airway patency: patent  Nausea & Vomiting: no nausea and no vomiting  Complications: no  Cardiovascular status: hemodynamically stable  Respiratory status: acceptable  Hydration status: euvolemic

## 2023-06-15 NOTE — OP NOTE
Ken Perez M.D.  [phone]           6/15/2023                EGD Operative Report  Reford Mannie  :  1950  [facility]ritz [facility]reola Medical Record Number:  [id number]      Indication: Brothers's/esophageal ulcer    : Kathryn Babb MD    Referring Provider:  Alex Butler MD      Anesthesia/Sedation:  MAC anesthesia    Airway assessment: No airway problems anticipated    Pre-Procedural Exam:      Airway: clear, no airway problems anticipated  Heart: RRR, without gallops or rubs  Lungs: clear bilaterally without wheezes, crackles, or rhonchi  Abdomen: soft, nontender, nondistended, bowel sounds present  Mental Status: awake, alert and oriented to person, place and time       Procedure Details     After infomed consent was obtained for the procedure, with all risks and benefits of procedure explained the patient was taken to the endoscopy suite and placed in the left lateral decubitus position. Following sequential administration of sedation as per above, the endoscope was inserted into the mouth and advanced under direct vision to second portion of the duodenum. A careful inspection was made as the gastroscope was withdrawn, including a retroflexed view of the proximal stomach; findings and interventions are described below. Findings:   Esophagus:Mucosa within normal through the esophagus, irregular Z-line was noted with two subcentimeter salmon colored mucosal tongues noted. These were biopsied before and showed intestinal metaplasia. No ulcers or lesions seen. Stomach: Small size hiatal hernia, otherwise mucosa within normal  Duodenum/jejunum: normal    Therapies:  none    Specimens:  GE-junction           Complications:   None; patient tolerated the procedure well. EBL:  None.            Impression:   Small size hiatal hernia, minimal changes from acid reflux, otherwise mucosa within normal    Recommendations:    -Continue acid suppression.  -Await

## 2023-07-11 ENCOUNTER — HOSPITAL ENCOUNTER (OUTPATIENT)
Facility: HOSPITAL | Age: 73
Discharge: HOME | End: 2023-07-14
Attending: INTERNAL MEDICINE
Payer: MEDICARE

## 2023-07-11 DIAGNOSIS — Z78.0 MENOPAUSE: ICD-10-CM

## 2023-07-11 DIAGNOSIS — M81.0 AGE-RELATED OSTEOPOROSIS WITHOUT CURRENT PATHOLOGICAL FRACTURE: ICD-10-CM

## 2023-07-11 PROCEDURE — 77080 DXA BONE DENSITY AXIAL: CPT

## 2023-07-17 ENCOUNTER — PATIENT MESSAGE (OUTPATIENT)
Age: 73
End: 2023-07-17

## 2023-07-17 DIAGNOSIS — K90.0 CELIAC DISEASE: Primary | ICD-10-CM

## 2023-08-01 RX ORDER — AMLODIPINE BESYLATE 5 MG/1
TABLET ORAL
Qty: 90 TABLET | Refills: 1 | Status: SHIPPED | OUTPATIENT
Start: 2023-08-01

## 2023-08-04 DIAGNOSIS — K90.0 CELIAC DISEASE: ICD-10-CM

## 2023-08-08 LAB
ENDOMYSIUM IGA SER QL: NEGATIVE
IGA SERPL-MCNC: 172 MG/DL (ref 64–422)
TTG IGA SER-ACNC: <2 U/ML (ref 0–3)

## 2023-10-31 ENCOUNTER — ANESTHESIA EVENT (OUTPATIENT)
Facility: HOSPITAL | Age: 73
End: 2023-10-31
Payer: MEDICARE

## 2023-10-31 ENCOUNTER — ANESTHESIA (OUTPATIENT)
Facility: HOSPITAL | Age: 73
End: 2023-10-31
Payer: MEDICARE

## 2023-10-31 ENCOUNTER — HOSPITAL ENCOUNTER (OUTPATIENT)
Facility: HOSPITAL | Age: 73
Setting detail: OUTPATIENT SURGERY
Discharge: HOME | End: 2023-10-31
Attending: INTERNAL MEDICINE | Admitting: INTERNAL MEDICINE
Payer: MEDICARE

## 2023-10-31 VITALS
TEMPERATURE: 97.8 F | DIASTOLIC BLOOD PRESSURE: 60 MMHG | BODY MASS INDEX: 23.37 KG/M2 | SYSTOLIC BLOOD PRESSURE: 147 MMHG | HEART RATE: 62 BPM | HEIGHT: 58 IN | WEIGHT: 111.33 LBS | RESPIRATION RATE: 14 BRPM | OXYGEN SATURATION: 100 %

## 2023-10-31 PROCEDURE — 3700000000 HC ANESTHESIA ATTENDED CARE: Performed by: INTERNAL MEDICINE

## 2023-10-31 PROCEDURE — 2709999900 HC NON-CHARGEABLE SUPPLY: Performed by: INTERNAL MEDICINE

## 2023-10-31 PROCEDURE — 3600007502: Performed by: INTERNAL MEDICINE

## 2023-10-31 PROCEDURE — 6360000002 HC RX W HCPCS: Performed by: NURSE ANESTHETIST, CERTIFIED REGISTERED

## 2023-10-31 PROCEDURE — 7100000011 HC PHASE II RECOVERY - ADDTL 15 MIN: Performed by: INTERNAL MEDICINE

## 2023-10-31 PROCEDURE — 88305 TISSUE EXAM BY PATHOLOGIST: CPT

## 2023-10-31 PROCEDURE — 2500000003 HC RX 250 WO HCPCS: Performed by: NURSE ANESTHETIST, CERTIFIED REGISTERED

## 2023-10-31 PROCEDURE — 7100000010 HC PHASE II RECOVERY - FIRST 15 MIN: Performed by: INTERNAL MEDICINE

## 2023-10-31 RX ORDER — PROPOFOL 10 MG/ML
INJECTION, EMULSION INTRAVENOUS PRN
Status: DISCONTINUED | OUTPATIENT
Start: 2023-10-31 | End: 2023-10-31 | Stop reason: SDUPTHER

## 2023-10-31 RX ORDER — PANTOPRAZOLE SODIUM 40 MG/1
40 TABLET, DELAYED RELEASE ORAL DAILY
COMMUNITY

## 2023-10-31 RX ORDER — SODIUM CHLORIDE 9 MG/ML
INJECTION, SOLUTION INTRAVENOUS CONTINUOUS
Status: DISCONTINUED | OUTPATIENT
Start: 2023-10-31 | End: 2023-10-31 | Stop reason: HOSPADM

## 2023-10-31 RX ORDER — LIDOCAINE HYDROCHLORIDE 20 MG/ML
INJECTION, SOLUTION EPIDURAL; INFILTRATION; INTRACAUDAL; PERINEURAL PRN
Status: DISCONTINUED | OUTPATIENT
Start: 2023-10-31 | End: 2023-10-31 | Stop reason: SDUPTHER

## 2023-10-31 RX ADMIN — PROPOFOL 70 MG: 10 INJECTION, EMULSION INTRAVENOUS at 11:43

## 2023-10-31 RX ADMIN — LIDOCAINE HYDROCHLORIDE 50 MG: 20 INJECTION, SOLUTION EPIDURAL; INFILTRATION; INTRACAUDAL; PERINEURAL at 11:42

## 2023-10-31 RX ADMIN — PROPOFOL 20 MG: 10 INJECTION, EMULSION INTRAVENOUS at 11:49

## 2023-10-31 RX ADMIN — PROPOFOL 30 MG: 10 INJECTION, EMULSION INTRAVENOUS at 11:46

## 2023-10-31 ASSESSMENT — PAIN - FUNCTIONAL ASSESSMENT: PAIN_FUNCTIONAL_ASSESSMENT: 0-10

## 2023-10-31 NOTE — DISCHARGE INSTRUCTIONS
Jimi Galicia M.D.  [phone]           10/31/2023  Joi Bang  :  1950  [employer] Medical Record Number:  [id number]        ENDOSCOPY FINDINGS:   Your endoscopy showed minimal redness at the bottom of the esophagus, small hiatal hernia, otherwise mucosa within normal. Biopsies were obtained. EGD DISCHARGE INSTRUCTIONS    DISCOMFORT:  Sore throat- throat lozenges or warm salt water gargle  redness at IV site- apply warm compress to area; if redness or soreness persist- contact your physician  Gaseous discomfort- walking, belching will help relieve any discomfort  You may not operate a vehicle for 12 hours  You may not engage in an occupation involving machinery or appliances for rest of today  You may not drink alcoholic beverages for at least 12 hours  Avoid making any critical decisions for at least 24 hour    DIET:   You may resume your regular diet. ACTIVITY  Spend the remainder of the day resting -  avoid any strenuous activity. Avoid driving or operating machinery. CALL M.D. ANY SIGN OF   Increasing pain, nausea, vomiting  Abdominal distension (swelling)  New increased bleeding (oral or rectal)  Fever (chills)  Pain in chest area  Bloody discharge from nose or mouth  Shortness of breath    Follow-up Instructions:   Call Dr. Justa Hale for any questions or problems. Telephone # [phone]  Biopsies were obtained, the results will be available  in  5 to 7 days. We will call you to notify you of these results. You can taper off the pantoprazole, I will send a prescription of 20 mg to your pharmacy take daily for 2 weeks, then every other day for 2 weeks then off. You can use it as needed after that.

## 2023-10-31 NOTE — H&P
Usama Rao M.D.  [phone]    History and Physical       NAME:  Mehdi Bernstein   :   1950   MRN:   [id number]       Referring Physician:  Dr. Ronald Owen Date: 10/31/2023 11:43 AM    Chief Complaint: Brothers's esophagus    History of Present Illness:  Patient is a 74 y.o. who is seen for Brothers's surveillance, history of low grade dysplasia seen on recent biopsies. Denies any ongoing GI complaints. PMH:  Past Medical History:   Diagnosis Date    Arthritis     Brothers esophagus     Blepharospasm     Cataract     Hx of mammogram 2019    Negative    Hyperlipidemia     Hypertension     Lichen planus bullosus     Neuralgia     Ocular hypertelorism     Osteoporosis 2016    DEXA    Routine Papanicolaou smear 2021    Negative (no hpv)     Sicca syndrome (HCC)     Sleep apnea     mouth device    Strabismus        PSH:  Past Surgical History:   Procedure Laterality Date    CATARACT REMOVAL  2020    cataract/glaucoma surgery     SECTION      x2    COLONOSCOPY N/A 2019    COLONOSCOPY performed by Jr Tellez MD at [facility]    trigger finger surgery    TONSILLECTOMY      at age 32    [address]; L/S    UPPER GASTROINTESTINAL ENDOSCOPY N/A 6/15/2023    EGD with BIOPSY performed by Duyen Yancey MD at [address] [place] [address] N/A 6/15/2023    EGD DIAGNOSTIC ONLY performed by Duyen Yancey MD at [facility] ENDOSCOPY       Allergies: Allergies   Allergen Reactions    Thiazide-Type Diuretics Other (See Comments)    Cephalexin Nausea And Vomiting    Codeine Nausea And Vomiting       Home Medications:  Prior to Admission Medications   Prescriptions Last Dose Informant Patient Reported? Taking?    Calcium Carbonate-Vitamin D (CALCIUM-VITAMIN D3 PO) 10/30/2023  Yes No   Sig: Take by mouth daily 1 tsbp   Multiple Vitamins-Minerals (MULTIVITAMIN WOMEN 50+) TABS 10/30/2023  Yes No   Sig:

## 2023-10-31 NOTE — PROGRESS NOTES
Endoscopy discharge instructions have been reviewed and given to patient. The patient verbalized understanding and acceptance of instructions. Dr. Everardo Da Silva  discussed with patient procedure findings and next steps.

## 2023-10-31 NOTE — OP NOTE
Micah Carlos M.D.  [phone]           10/31/2023                EGD Operative Report  Mike Mason  :  1950  [facility]ritz Arreola Medical Record Number:  [id number]      Indication: Brothers's/esophageal ulcer    : Rajiv Cole MD    Referring Provider:  Glenys Calles MD      Anesthesia/Sedation:  MAC anesthesia    Airway assessment: No airway problems anticipated    Pre-Procedural Exam:      Airway: clear, no airway problems anticipated  Heart: RRR, without gallops or rubs  Lungs: clear bilaterally without wheezes, crackles, or rhonchi  Abdomen: soft, nontender, nondistended, bowel sounds present  Mental Status: awake, alert and oriented to person, place and time       Procedure Details     After infomed consent was obtained for the procedure, with all risks and benefits of procedure explained the patient was taken to the endoscopy suite and placed in the left lateral decubitus position. Following sequential administration of sedation as per above, the endoscope was inserted into the mouth and advanced under direct vision to second portion of the duodenum. A careful inspection was made as the gastroscope was withdrawn, including a retroflexed view of the proximal stomach; findings and interventions are described below. Findings:   Esophagus: Irregular Z-line with minimal erythema, otherwise mucosa within normal. No lesions or ulcers seen. Stomach: Small hiatal hernia, otherwise mucosa within normal.  Duodenum/jejunum: normal    Therapies:  none    Specimens:  GE-junction           Complications:   None; patient tolerated the procedure well. EBL:  None.            Impression:   Small hiatal hernia, otherwise upper endoscopy within normal.     Recommendations:    -Follow-up on pathology results  -Continue with anti-reflux measures  -Can stop PPI therapy if biopsies are within normal.    Rajiv Cole MD

## 2023-10-31 NOTE — PROGRESS NOTES
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 Cruz Corrigan   Assistant(s): Circulator: Iliana Dickens RN  Endoscopy Technician: Blank Narvaez    Specimens:   ID Type Source Tests Collected by Time Destination   1 : GE Junction Biopsy. Tissue GE Junction Biopsy SURGICAL PATHOLOGY Hiram Caldera MD 10/31/2023 1149      H. Pylori    no    Assessment:  Intra-procedure medications     Anesthesia gave intra-procedure sedation and medications, see anesthesia flow sheet   yes    Intravenous fluids: NS@ KVO     Vital signs stable   yes    Abdominal assessment: round and soft   yes    Recommendation:  Discharge patient per MD order  yes.   Return to floor  outpatient  Family or Friend   friend   Permission to share finding with family or friend   yes

## 2023-10-31 NOTE — ANESTHESIA POSTPROCEDURE EVALUATION
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 [K22.70])    Surgeons: Sarabjit Aguilar MD Responsible Provider: Samm Benjamin MD    Anesthesia Type: MAC ASA Status: 2          Anesthesia Type: MAC    Odalys Phase I: Odalys Score: 10    Odalys Phase II: Odalys Score: 10      Anesthesia Post Evaluation    Patient location during evaluation: PACU  Patient participation: complete - patient participated  Level of consciousness: awake  Pain score: 0  Airway patency: patent  Nausea & Vomiting: no nausea and no vomiting  Complications: no  Cardiovascular status: blood pressure returned to baseline  Respiratory status: acceptable  Hydration status: euvolemic  Pain management: adequate

## 2023-10-31 NOTE — PERIOP NOTE
Nursing report given to ANTHONY DE LA TORRE RN. Patient tolerated procedure without problems. Abdomen soft and patient arousable and voices no complaints Report received from  Hazel Fernandez CRNA, see anesthesia note. Patient transported to endoscopy recovery area. Scope pre-cleaned by Siddharth Hall at bedside.

## 2023-11-14 ENCOUNTER — OFFICE VISIT (OUTPATIENT)
Age: 73
End: 2023-11-14
Payer: MEDICARE

## 2023-11-14 VITALS
BODY MASS INDEX: 23.51 KG/M2 | SYSTOLIC BLOOD PRESSURE: 133 MMHG | WEIGHT: 112 LBS | TEMPERATURE: 98.4 F | DIASTOLIC BLOOD PRESSURE: 70 MMHG | HEIGHT: 58 IN | OXYGEN SATURATION: 98 % | HEART RATE: 70 BPM

## 2023-11-14 DIAGNOSIS — M81.0 AGE-RELATED OSTEOPOROSIS WITHOUT CURRENT PATHOLOGICAL FRACTURE: ICD-10-CM

## 2023-11-14 DIAGNOSIS — Z23 NEED FOR INFLUENZA VACCINATION: Primary | ICD-10-CM

## 2023-11-14 LAB
25(OH)D3 SERPL-MCNC: 30.8 NG/ML (ref 30–100)
CALCIUM SERPL-MCNC: 9.7 MG/DL (ref 8.5–10.1)
PTH-INTACT SERPL-MCNC: 54.6 PG/ML (ref 18.4–88)

## 2023-11-14 PROCEDURE — G8420 CALC BMI NORM PARAMETERS: HCPCS | Performed by: INTERNAL MEDICINE

## 2023-11-14 PROCEDURE — G8399 PT W/DXA RESULTS DOCUMENT: HCPCS | Performed by: INTERNAL MEDICINE

## 2023-11-14 PROCEDURE — 1090F PRES/ABSN URINE INCON ASSESS: CPT | Performed by: INTERNAL MEDICINE

## 2023-11-14 PROCEDURE — 1123F ACP DISCUSS/DSCN MKR DOCD: CPT | Performed by: INTERNAL MEDICINE

## 2023-11-14 PROCEDURE — 99213 OFFICE O/P EST LOW 20 MIN: CPT | Performed by: INTERNAL MEDICINE

## 2023-11-14 PROCEDURE — 3078F DIAST BP <80 MM HG: CPT | Performed by: INTERNAL MEDICINE

## 2023-11-14 PROCEDURE — G8484 FLU IMMUNIZE NO ADMIN: HCPCS | Performed by: INTERNAL MEDICINE

## 2023-11-14 PROCEDURE — G8428 CUR MEDS NOT DOCUMENT: HCPCS | Performed by: INTERNAL MEDICINE

## 2023-11-14 PROCEDURE — 90694 VACC AIIV4 NO PRSRV 0.5ML IM: CPT | Performed by: INTERNAL MEDICINE

## 2023-11-14 PROCEDURE — 3017F COLORECTAL CA SCREEN DOC REV: CPT | Performed by: INTERNAL MEDICINE

## 2023-11-14 PROCEDURE — 1036F TOBACCO NON-USER: CPT | Performed by: INTERNAL MEDICINE

## 2023-11-14 PROCEDURE — PBSHW INFLUENZA, FLUAD, (AGE 65 Y+), IM, PF, 0.5 ML: Performed by: INTERNAL MEDICINE

## 2023-11-14 PROCEDURE — 3075F SYST BP GE 130 - 139MM HG: CPT | Performed by: INTERNAL MEDICINE

## 2023-11-14 RX ORDER — PANTOPRAZOLE SODIUM 20 MG/1
TABLET, DELAYED RELEASE ORAL
COMMUNITY
Start: 2023-11-04

## 2023-11-14 ASSESSMENT — PATIENT HEALTH QUESTIONNAIRE - PHQ9
1. LITTLE INTEREST OR PLEASURE IN DOING THINGS: 0
SUM OF ALL RESPONSES TO PHQ QUESTIONS 1-9: 0
SUM OF ALL RESPONSES TO PHQ QUESTIONS 1-9: 0
2. FEELING DOWN, DEPRESSED OR HOPELESS: 0
SUM OF ALL RESPONSES TO PHQ QUESTIONS 1-9: 0
SUM OF ALL RESPONSES TO PHQ9 QUESTIONS 1 & 2: 0
SUM OF ALL RESPONSES TO PHQ QUESTIONS 1-9: 0

## 2023-11-20 LAB
ALBUMIN SERPL ELPH-MCNC: 3.9 G/DL (ref 2.9–4.4)
ALBUMIN/GLOB SERPL: 1.4 (ref 0.7–1.7)
ALPHA1 GLOB SERPL ELPH-MCNC: 0.3 G/DL (ref 0–0.4)
ALPHA2 GLOB SERPL ELPH-MCNC: 0.8 G/DL (ref 0.4–1)
B-GLOBULIN SERPL ELPH-MCNC: 1 G/DL (ref 0.7–1.3)
GAMMA GLOB SERPL ELPH-MCNC: 0.8 G/DL (ref 0.4–1.8)
GLOBULIN SER CALC-MCNC: 2.8 G/DL (ref 2.2–3.9)
M PROTEIN SERPL ELPH-MCNC: NORMAL G/DL
PROT SERPL-MCNC: 6.7 G/DL (ref 6–8.5)

## 2023-12-13 ENCOUNTER — TELEPHONE (OUTPATIENT)
Age: 73
End: 2023-12-13

## 2023-12-13 NOTE — TELEPHONE ENCOUNTER
Patient need a 6 month follow-up [facility] Dr. Juan Pablo Aponte in may.  Called and left voicemail

## 2024-01-25 ENCOUNTER — TRANSCRIBE ORDERS (OUTPATIENT)
Facility: HOSPITAL | Age: 74
End: 2024-01-25

## 2024-01-25 DIAGNOSIS — Z12.31 VISIT FOR SCREENING MAMMOGRAM: Primary | ICD-10-CM

## 2024-01-31 RX ORDER — AMLODIPINE BESYLATE 5 MG/1
5 TABLET ORAL DAILY
Qty: 90 TABLET | Refills: 1 | Status: SHIPPED | OUTPATIENT
Start: 2024-01-31

## 2024-02-27 ENCOUNTER — HOSPITAL ENCOUNTER (OUTPATIENT)
Facility: HOSPITAL | Age: 74
Discharge: HOME | End: 2024-03-01
Attending: INTERNAL MEDICINE
Payer: MEDICARE

## 2024-02-27 DIAGNOSIS — Z12.31 VISIT FOR SCREENING MAMMOGRAM: ICD-10-CM

## 2024-02-27 PROCEDURE — 77063 BREAST TOMOSYNTHESIS BI: CPT

## 2024-05-12 SDOH — HEALTH STABILITY: PHYSICAL HEALTH: ON AVERAGE, HOW MANY DAYS PER WEEK DO YOU ENGAGE IN MODERATE TO STRENUOUS EXERCISE (LIKE A BRISK WALK)?: 7 DAYS

## 2024-05-12 SDOH — HEALTH STABILITY: PHYSICAL HEALTH: ON AVERAGE, HOW MANY MINUTES DO YOU ENGAGE IN EXERCISE AT THIS LEVEL?: 120 MIN

## 2024-05-12 SDOH — ECONOMIC STABILITY: INCOME INSECURITY: HOW HARD IS IT FOR YOU TO PAY FOR THE VERY BASICS LIKE FOOD, HOUSING, MEDICAL CARE, AND HEATING?: NOT HARD AT ALL

## 2024-05-12 SDOH — ECONOMIC STABILITY: FOOD INSECURITY: WITHIN THE PAST 12 MONTHS, THE FOOD YOU BOUGHT JUST DIDN'T LAST AND YOU DIDN'T HAVE MONEY TO GET MORE.: NEVER TRUE

## 2024-05-12 SDOH — ECONOMIC STABILITY: FOOD INSECURITY: WITHIN THE PAST 12 MONTHS, YOU WORRIED THAT YOUR FOOD WOULD RUN OUT BEFORE YOU GOT MONEY TO BUY MORE.: NEVER TRUE

## 2024-05-12 ASSESSMENT — LIFESTYLE VARIABLES
HOW OFTEN DO YOU HAVE A DRINK CONTAINING ALCOHOL: NEVER
HOW MANY STANDARD DRINKS CONTAINING ALCOHOL DO YOU HAVE ON A TYPICAL DAY: PATIENT DOES NOT DRINK
HOW OFTEN DO YOU HAVE A DRINK CONTAINING ALCOHOL: 1
HOW OFTEN DO YOU HAVE SIX OR MORE DRINKS ON ONE OCCASION: 1
HOW MANY STANDARD DRINKS CONTAINING ALCOHOL DO YOU HAVE ON A TYPICAL DAY: 0

## 2024-05-12 ASSESSMENT — PATIENT HEALTH QUESTIONNAIRE - PHQ9
SUM OF ALL RESPONSES TO PHQ QUESTIONS 1-9: 0
2. FEELING DOWN, DEPRESSED OR HOPELESS: NOT AT ALL
SUM OF ALL RESPONSES TO PHQ QUESTIONS 1-9: 0
1. LITTLE INTEREST OR PLEASURE IN DOING THINGS: NOT AT ALL
SUM OF ALL RESPONSES TO PHQ9 QUESTIONS 1 & 2: 0

## 2024-05-14 ENCOUNTER — OFFICE VISIT (OUTPATIENT)
Age: 74
End: 2024-05-14
Payer: MEDICARE

## 2024-05-14 VITALS
BODY MASS INDEX: 21.92 KG/M2 | TEMPERATURE: 98.7 F | WEIGHT: 104.4 LBS | HEART RATE: 64 BPM | RESPIRATION RATE: 14 BRPM | OXYGEN SATURATION: 98 % | SYSTOLIC BLOOD PRESSURE: 150 MMHG | DIASTOLIC BLOOD PRESSURE: 72 MMHG | HEIGHT: 58 IN

## 2024-05-14 DIAGNOSIS — M81.0 AGE-RELATED OSTEOPOROSIS WITHOUT CURRENT PATHOLOGICAL FRACTURE: ICD-10-CM

## 2024-05-14 DIAGNOSIS — I10 HTN (HYPERTENSION), BENIGN: ICD-10-CM

## 2024-05-14 DIAGNOSIS — E78.5 INCREASED SERUM LIPIDS: ICD-10-CM

## 2024-05-14 DIAGNOSIS — Z00.00 MEDICARE ANNUAL WELLNESS VISIT, SUBSEQUENT: ICD-10-CM

## 2024-05-14 DIAGNOSIS — K22.70 BARRETT'S ESOPHAGUS WITHOUT DYSPLASIA: ICD-10-CM

## 2024-05-14 DIAGNOSIS — I10 HTN (HYPERTENSION), BENIGN: Primary | ICD-10-CM

## 2024-05-14 LAB
ALBUMIN SERPL-MCNC: 4 G/DL (ref 3.5–5)
ALBUMIN/GLOB SERPL: 1.2 (ref 1.1–2.2)
ALP SERPL-CCNC: 94 U/L (ref 45–117)
ALT SERPL-CCNC: 34 U/L (ref 12–78)
ANION GAP SERPL CALC-SCNC: ABNORMAL MMOL/L (ref 5–15)
AST SERPL-CCNC: 28 U/L (ref 15–37)
BASOPHILS # BLD: 0 K/UL (ref 0–0.1)
BASOPHILS NFR BLD: 1 % (ref 0–1)
BILIRUB SERPL-MCNC: 1.1 MG/DL (ref 0.2–1)
BUN SERPL-MCNC: 11 MG/DL (ref 6–20)
BUN/CREAT SERPL: 15 (ref 12–20)
CALCIUM SERPL-MCNC: 10.1 MG/DL (ref 8.5–10.1)
CHLORIDE SERPL-SCNC: 107 MMOL/L (ref 97–108)
CHOLEST SERPL-MCNC: 169 MG/DL
CO2 SERPL-SCNC: 32 MMOL/L (ref 21–32)
CREAT SERPL-MCNC: 0.72 MG/DL (ref 0.55–1.02)
DIFFERENTIAL METHOD BLD: NORMAL
EOSINOPHIL # BLD: 0.1 K/UL (ref 0–0.4)
EOSINOPHIL NFR BLD: 2 % (ref 0–7)
ERYTHROCYTE [DISTWIDTH] IN BLOOD BY AUTOMATED COUNT: 12.5 % (ref 11.5–14.5)
GLOBULIN SER CALC-MCNC: 3.4 G/DL (ref 2–4)
GLUCOSE SERPL-MCNC: 108 MG/DL (ref 65–100)
HCT VFR BLD AUTO: 41.6 % (ref 35–47)
HDLC SERPL-MCNC: 106 MG/DL
HDLC SERPL: 1.6 (ref 0–5)
HGB BLD-MCNC: 13.5 G/DL (ref 11.5–16)
IMM GRANULOCYTES # BLD AUTO: 0 K/UL (ref 0–0.04)
IMM GRANULOCYTES NFR BLD AUTO: 0 % (ref 0–0.5)
LDLC SERPL CALC-MCNC: 51.8 MG/DL (ref 0–100)
LYMPHOCYTES # BLD: 1.3 K/UL (ref 0.8–3.5)
LYMPHOCYTES NFR BLD: 27 % (ref 12–49)
MCH RBC QN AUTO: 30.3 PG (ref 26–34)
MCHC RBC AUTO-ENTMCNC: 32.5 G/DL (ref 30–36.5)
MCV RBC AUTO: 93.5 FL (ref 80–99)
MONOCYTES # BLD: 0.4 K/UL (ref 0–1)
MONOCYTES NFR BLD: 8 % (ref 5–13)
NEUTS SEG # BLD: 3 K/UL (ref 1.8–8)
NEUTS SEG NFR BLD: 62 % (ref 32–75)
NRBC # BLD: 0 K/UL (ref 0–0.01)
NRBC BLD-RTO: 0 PER 100 WBC
PLATELET # BLD AUTO: 183 K/UL (ref 150–400)
PMV BLD AUTO: 10 FL (ref 8.9–12.9)
POTASSIUM SERPL-SCNC: 4.8 MMOL/L (ref 3.5–5.1)
PROT SERPL-MCNC: 7.4 G/DL (ref 6.4–8.2)
RBC # BLD AUTO: 4.45 M/UL (ref 3.8–5.2)
SODIUM SERPL-SCNC: 138 MMOL/L (ref 136–145)
TRIGL SERPL-MCNC: 56 MG/DL
VLDLC SERPL CALC-MCNC: 11.2 MG/DL
WBC # BLD AUTO: 4.8 K/UL (ref 3.6–11)

## 2024-05-14 PROCEDURE — G0439 PPPS, SUBSEQ VISIT: HCPCS | Performed by: INTERNAL MEDICINE

## 2024-05-14 PROCEDURE — 1090F PRES/ABSN URINE INCON ASSESS: CPT | Performed by: INTERNAL MEDICINE

## 2024-05-14 PROCEDURE — G8427 DOCREV CUR MEDS BY ELIG CLIN: HCPCS | Performed by: INTERNAL MEDICINE

## 2024-05-14 PROCEDURE — G8399 PT W/DXA RESULTS DOCUMENT: HCPCS | Performed by: INTERNAL MEDICINE

## 2024-05-14 PROCEDURE — 99214 OFFICE O/P EST MOD 30 MIN: CPT | Performed by: INTERNAL MEDICINE

## 2024-05-14 PROCEDURE — G8420 CALC BMI NORM PARAMETERS: HCPCS | Performed by: INTERNAL MEDICINE

## 2024-05-14 PROCEDURE — 3017F COLORECTAL CA SCREEN DOC REV: CPT | Performed by: INTERNAL MEDICINE

## 2024-05-14 PROCEDURE — 3077F SYST BP >= 140 MM HG: CPT | Performed by: INTERNAL MEDICINE

## 2024-05-14 PROCEDURE — 1123F ACP DISCUSS/DSCN MKR DOCD: CPT | Performed by: INTERNAL MEDICINE

## 2024-05-14 PROCEDURE — 1036F TOBACCO NON-USER: CPT | Performed by: INTERNAL MEDICINE

## 2024-05-14 PROCEDURE — 3078F DIAST BP <80 MM HG: CPT | Performed by: INTERNAL MEDICINE

## 2024-05-14 NOTE — PROGRESS NOTES
Cognitive Screen     \"I'm going to say three words. Please repeat them back to me:     Banana  Airplane  Sock     (patient repeats the words)  \"Good. Keep these words in the back of your mind and I'll ask you to tell me those words again in a few minutes.\"  (Do other questions for three minutes and come back to this).      Result: 3/3

## 2024-05-15 NOTE — PATIENT INSTRUCTIONS
attack. These may include:    Chest pain or pressure, or a strange feeling in the chest.     Sweating.     Shortness of breath.     Pain, pressure, or a strange feeling in the back, neck, jaw, or upper belly or in one or both shoulders or arms.     Lightheadedness or sudden weakness.     A fast or irregular heartbeat.   After you call 911, the  may tell you to chew 1 adult-strength or 2 to 4 low-dose aspirin. Wait for an ambulance. Do not try to drive yourself.  Watch closely for changes in your health, and be sure to contact your doctor if you have any problems.  Where can you learn more?  Go to https://www.[facility].net/patientEd and enter F075 to learn more about \"A Healthy Heart: Care Instructions.\"  Current as of: June 24, 2023               Content Version: 14.0  © 6609-3467 [employer].   Care instructions adapted under license by [employer]. If you have questions about a medical condition or this instruction, always ask your healthcare professional. [employer] disclaims any warranty or liability for your use of this information.      Personalized Preventive Plan for Amy Ta - 5/14/2024  Medicare offers a range of preventive health benefits. Some of the tests and screenings are paid in full while other may be subject to a deductible, co-insurance, and/or copay.    Some of these benefits include a comprehensive review of your medical history including lifestyle, illnesses that may run in your family, and various assessments and screenings as appropriate.    After reviewing your medical record and screening and assessments performed today your provider may have ordered immunizations, labs, imaging, and/or referrals for you.  A list of these orders (if applicable) as well as your Preventive Care list are included within your After Visit Summary for your review.    Other Preventive Recommendations:    A preventive eye exam performed by an eye specialist is recommended

## 2024-05-15 NOTE — PROGRESS NOTES
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  Recommended screening schedule for the next 5-10 years is provided to the patient in written form: see Patient Instructions/AVS.     Return in 6 months (on 11/14/2024).     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  She has an appointment to see endocrinology Dr. Grant in about a week to discuss other treatment for osteoporosis she denies any falling or fall behavior she says she is moved to an apartment she sold her house there was a concern about her driving but she now has [facility] permission to drive she has hypertension she says home blood pressure readings were normal using a wrist cuff but she has not been checking them lately she is most

## 2024-05-20 ENCOUNTER — TELEPHONE (OUTPATIENT)
Age: 74
End: 2024-05-20

## 2024-05-20 NOTE — TELEPHONE ENCOUNTER
----- Message from Amy Ta sent at 5/16/2024  2:01 PM EDT -----  Regarding: Referral to [facility] - Dr. Grant  Contact: [phone]  Hi Dr. Paulino.  After our visit on 5/14/24, I confirmed my appt with [facility] for 5/22  - you recommended me to Dr. Grant for my osteoporosis.  His office did receive 12 pages of records from your office a while ago, but since I now have more test results, the rep told me to ask that your office send the  new records to them as well.  Can you please have your office send the new records to Dr. Grant?  I am still getting settled from my move.  Their fax no. is [phone].  Thank you for the note. Best regards to you from MAILE Ta

## 2024-06-06 ENCOUNTER — TELEPHONE (OUTPATIENT)
Age: 74
End: 2024-06-06

## 2024-06-06 NOTE — TELEPHONE ENCOUNTER
Pt is requesting a refill for the following medication    atorvastatin (LIPITOR) 20 MG tablet     Medication needs to be sent to the following pharmacy    [place] [id number] [facility]  [phone]

## 2024-06-07 RX ORDER — ATORVASTATIN CALCIUM 20 MG/1
20 TABLET, FILM COATED ORAL DAILY
Qty: 90 TABLET | Refills: 3 | Status: SHIPPED | OUTPATIENT
Start: 2024-06-07

## 2024-06-07 NOTE — TELEPHONE ENCOUNTER
[facility]   Requested Prescriptions     Pending Prescriptions Disp Refills    atorvastatin (LIPITOR) 20 MG tablet 90 tablet 3     Sig: Take 1 tablet by mouth daily     Last office visit: 5/14/2024   Next office visit: 11/14/2024     Routed to Dr Rony Paulino for review.

## 2024-07-23 RX ORDER — AMLODIPINE BESYLATE 5 MG/1
5 TABLET ORAL DAILY
Qty: 90 TABLET | Refills: 1 | Status: SHIPPED | OUTPATIENT
Start: 2024-07-23

## 2024-10-29 ENCOUNTER — OFFICE VISIT (OUTPATIENT)
Age: 74
End: 2024-10-29
Payer: MEDICARE

## 2024-10-29 VITALS
BODY MASS INDEX: 23.18 KG/M2 | DIASTOLIC BLOOD PRESSURE: 93 MMHG | SYSTOLIC BLOOD PRESSURE: 147 MMHG | HEART RATE: 64 BPM | WEIGHT: 110.4 LBS | OXYGEN SATURATION: 96 % | HEIGHT: 58 IN | TEMPERATURE: 97.7 F | RESPIRATION RATE: 18 BRPM

## 2024-10-29 DIAGNOSIS — M79.602 LEFT ARM PAIN: Primary | ICD-10-CM

## 2024-10-29 DIAGNOSIS — R07.89 OTHER CHEST PAIN: ICD-10-CM

## 2024-10-29 LAB — TROPONIN I SERPL HS-MCNC: 7 NG/L (ref 0–51)

## 2024-10-29 PROCEDURE — 99214 OFFICE O/P EST MOD 30 MIN: CPT | Performed by: STUDENT IN AN ORGANIZED HEALTH CARE EDUCATION/TRAINING PROGRAM

## 2024-10-29 RX ORDER — NITROGLYCERIN 0.4 MG/1
0.4 TABLET SUBLINGUAL EVERY 5 MIN PRN
Qty: 10 TABLET | Refills: 0 | Status: SHIPPED | OUTPATIENT
Start: 2024-10-29

## 2024-10-29 ASSESSMENT — ENCOUNTER SYMPTOMS
NAUSEA: 0
VOMITING: 0
SINUS PAIN: 0
CHEST TIGHTNESS: 1
CONSTIPATION: 0
ABDOMINAL PAIN: 0
COUGH: 0
SHORTNESS OF BREATH: 0
DIARRHEA: 0
WHEEZING: 0
EYE REDNESS: 0

## 2024-10-29 NOTE — PROGRESS NOTES
I have reviewed all needed documentation in preparation for visit. Verified patient by name and date of birth  Chief Complaint   Patient presents with    left arm pain        There were no vitals filed for this visit.    Health Maintenance Due   Topic Date Due    Flu vaccine (1) 08/01/2024    COVID-19 Vaccine (7 - 2023-24 season) 09/01/2024     \"Have you been to the ER, urgent care clinic since your last visit?  Hospitalized since your last visit?\"    NO    “Have you seen or consulted any other health care providers outside of [facility] since your last visit?”    NO            Click Here for Release of Records Request         JOSE Muñoz

## 2024-10-29 NOTE — PROGRESS NOTES
Amy Ta is a 74 y.o. female  Chief Complaint   Patient presents with    left arm pain      Left arm pain      HPI  Patient presents for acute left arm pain with some left sided chest pressure that has been ongoing x 3 days and was alleviated with baby aspirin. Onset is insidious without identifiable trigger. She denies associated SOB. She had similar symptoms 5 years ago and had stress test which was normal. She has uncontrolled blood pressure in the office but has normal blood pressure readings at home, however she does use a wrist cuff. She is not having active chest pain today in the office. She does not have nitroglycerin for chest pain at home.     ROS  Review of Systems   Constitutional:  Negative for chills and fever.   HENT:  Negative for sinus pain.    Eyes:  Negative for redness.   Respiratory:  Positive for chest tightness. Negative for cough, shortness of breath and wheezing.    Cardiovascular:  Positive for chest pain.   Gastrointestinal:  Negative for abdominal pain, constipation, diarrhea, nausea and vomiting.   Endocrine: Negative for polyuria.   Genitourinary:  Negative for dysuria.   Musculoskeletal:  Negative for arthralgias and myalgias.        Left arm pain   Neurological:  Negative for light-headedness and headaches.   Psychiatric/Behavioral:  Negative for agitation and dysphoric mood. The patient is not nervous/anxious.          Vitals:    10/29/24 0812   BP: (!) 147/93   Pulse: 64   Resp: 18   Temp: 97.7 °F (36.5 °C)   SpO2: 96%      Wt Readings from Last 3 Encounters:   10/29/24 50.1 kg (110 lb 6.4 oz)   05/14/24 47.4 kg (104 lb 6.4 oz)   11/14/23 50.8 kg (112 lb)       EXAM  Physical Exam  Vitals and nursing note reviewed.   Constitutional:       Appearance: Normal appearance.   HENT:      Head: Normocephalic and atraumatic.   Eyes:      Conjunctiva/sclera: Conjunctivae normal.   Cardiovascular:      Rate and Rhythm: Normal rate and regular rhythm.      Pulses: Normal pulses.

## 2024-11-02 LAB
CK BB CFR SERPL ELPH: 0 %
CK MACRO1 CFR SERPL: 0 %
CK MACRO2 CFR SERPL: 0 %
CK MB CFR SERPL ELPH: 0 % (ref 0–3)
CK MM CFR SERPL ELPH: 100 % (ref 97–100)
CK SERPL-CCNC: 89 U/L (ref 32–182)

## 2024-11-06 ENCOUNTER — TELEPHONE (OUTPATIENT)
Age: 74
End: 2024-11-06

## 2024-11-06 DIAGNOSIS — R07.89 OTHER CHEST PAIN: ICD-10-CM

## 2024-11-06 RX ORDER — NITROGLYCERIN 0.4 MG/1
0.4 TABLET SUBLINGUAL EVERY 5 MIN PRN
Qty: 25 TABLET | Refills: 0 | Status: SHIPPED | OUTPATIENT
Start: 2024-11-06

## 2024-11-06 NOTE — TELEPHONE ENCOUNTER
Pt called back stating that she will not be picking up her prescription because she doesn't have heart problems. Said that she doesn't want to have medication that rots for not using nor have this medication her records. Pt said that she will wait until her initial appointment with Dr. Paulino to verify this information.

## 2024-11-06 NOTE — TELEPHONE ENCOUNTER
Patient returned call to inform Dr. Le that she is going to  the prescription for nitroGLYCERIN (NITROSTAT) 0.4 MG SL tablet. She states she was concerned about taking this medication because she read Dr. Le's office notes from her last visit, which did not show anything about heart problems. I informed the patient that per the notes on the prescription, this medication is for \"chest pain as needed.\" The patient has a follow up with Dr. Paulino on 11/14/2024 and states she will  the medication and follow up with Dr. Paulino then.

## 2024-11-06 NOTE — TELEPHONE ENCOUNTER
[facility] pharmacy calling regarding the prescription for nitroGLYCERIN (NITROSTAT) 0.4 MG SL tablet written by Dr. Le. She states the prescription needs to be re-written to dispense a 25 count as this medication cannot be dispensed as a 10 count.

## 2024-11-14 ENCOUNTER — OFFICE VISIT (OUTPATIENT)
Age: 74
End: 2024-11-14

## 2024-11-14 VITALS
HEIGHT: 58 IN | TEMPERATURE: 97.9 F | BODY MASS INDEX: 22.88 KG/M2 | OXYGEN SATURATION: 98 % | RESPIRATION RATE: 16 BRPM | WEIGHT: 109 LBS | DIASTOLIC BLOOD PRESSURE: 77 MMHG | SYSTOLIC BLOOD PRESSURE: 131 MMHG | HEART RATE: 79 BPM

## 2024-11-14 DIAGNOSIS — G47.33 OSA (OBSTRUCTIVE SLEEP APNEA): Primary | ICD-10-CM

## 2024-11-14 DIAGNOSIS — M81.0 AGE-RELATED OSTEOPOROSIS WITHOUT CURRENT PATHOLOGICAL FRACTURE: ICD-10-CM

## 2024-11-14 DIAGNOSIS — R07.89 OTHER CHEST PAIN: ICD-10-CM

## 2024-11-14 DIAGNOSIS — I10 HTN (HYPERTENSION), BENIGN: ICD-10-CM

## 2024-11-14 NOTE — PROGRESS NOTES
I have reviewed all needed documentation in preparation for visit. Verified patient by name and date of birth  Chief Complaint   Patient presents with    6 Month Follow-Up       Vitals:    11/14/24 1121   BP: 133/66   Site: Left Upper Arm   Position: Sitting   Cuff Size: Small Adult   Pulse: 79   Resp: 16   Temp: 97.9 °F (36.6 °C)   TempSrc: Temporal   SpO2: 98%   Weight: 49.4 kg (109 lb)   Height: 1.473 m (4' 10\")       Health Maintenance Due   Topic Date Due    Flu vaccine (1) 08/01/2024    COVID-19 Vaccine (7 - 2023-24 season) 09/01/2024     \"Have you been to the ER, urgent care clinic since your last visit?  Hospitalized since your last visit?\"    NO    “Have you seen or consulted any other health care providers outside of [facility] since your last visit?”    NO            Click Here for Release of Records Request         Gia Hinojosa [facility]

## 2024-12-19 ENCOUNTER — OFFICE VISIT (OUTPATIENT)
Age: 74
End: 2024-12-19
Payer: MEDICARE

## 2024-12-19 VITALS
OXYGEN SATURATION: 98 % | SYSTOLIC BLOOD PRESSURE: 130 MMHG | DIASTOLIC BLOOD PRESSURE: 68 MMHG | HEIGHT: 58 IN | BODY MASS INDEX: 23.38 KG/M2 | WEIGHT: 111.4 LBS | HEART RATE: 74 BPM | RESPIRATION RATE: 18 BRPM

## 2024-12-19 DIAGNOSIS — I10 ESSENTIAL HYPERTENSION: ICD-10-CM

## 2024-12-19 DIAGNOSIS — E78.2 MIXED HYPERLIPIDEMIA: ICD-10-CM

## 2024-12-19 DIAGNOSIS — R07.9 CHEST PAIN, UNSPECIFIED TYPE: Primary | ICD-10-CM

## 2024-12-19 PROCEDURE — G8427 DOCREV CUR MEDS BY ELIG CLIN: HCPCS | Performed by: INTERNAL MEDICINE

## 2024-12-19 PROCEDURE — 1090F PRES/ABSN URINE INCON ASSESS: CPT | Performed by: INTERNAL MEDICINE

## 2024-12-19 PROCEDURE — 1036F TOBACCO NON-USER: CPT | Performed by: INTERNAL MEDICINE

## 2024-12-19 PROCEDURE — 1123F ACP DISCUSS/DSCN MKR DOCD: CPT | Performed by: INTERNAL MEDICINE

## 2024-12-19 PROCEDURE — G8484 FLU IMMUNIZE NO ADMIN: HCPCS | Performed by: INTERNAL MEDICINE

## 2024-12-19 PROCEDURE — 1159F MED LIST DOCD IN RCRD: CPT | Performed by: INTERNAL MEDICINE

## 2024-12-19 PROCEDURE — 99204 OFFICE O/P NEW MOD 45 MIN: CPT | Performed by: INTERNAL MEDICINE

## 2024-12-19 PROCEDURE — 1126F AMNT PAIN NOTED NONE PRSNT: CPT | Performed by: INTERNAL MEDICINE

## 2024-12-19 PROCEDURE — 3078F DIAST BP <80 MM HG: CPT | Performed by: INTERNAL MEDICINE

## 2024-12-19 PROCEDURE — G8399 PT W/DXA RESULTS DOCUMENT: HCPCS | Performed by: INTERNAL MEDICINE

## 2024-12-19 PROCEDURE — G8420 CALC BMI NORM PARAMETERS: HCPCS | Performed by: INTERNAL MEDICINE

## 2024-12-19 PROCEDURE — 3075F SYST BP GE 130 - 139MM HG: CPT | Performed by: INTERNAL MEDICINE

## 2024-12-19 RX ORDER — ATENOLOL 25 MG/1
TABLET ORAL
Qty: 2 TABLET | Refills: 0 | Status: SHIPPED | OUTPATIENT
Start: 2024-12-19

## 2024-12-19 NOTE — PROGRESS NOTES
Chief Complaint   Patient presents with    New Patient    Hypertension    Hyperlipidemia     Center chest discomfort seen by PCP    Chest Pain  No        Last Lipids   Lab Results   Component Value Date    CHOL 169 05/14/2024    TRIG 56 05/14/2024     05/14/2024    VLDL 11.2 05/14/2024    CHOLHDLRATIO 1.6 05/14/2024        Refills    /68 (Site: Left Upper Arm, Position: Sitting)   Pulse 74   Resp 18   Ht 1.473 m (4' 10\")   Wt 50.5 kg (111 lb 6.4 oz)   LMP  (LMP Unknown)   SpO2 98%   BMI 23.28 kg/m²       Have you been to the ER, urgent care clinic since your last visit? No  Hospitalized since your last visit? NO    2. Have you seen or consulted with any other health care providers outside of the [facility] since your last visit?  No  
Hypertension     Lichen planus bullosus     Neuralgia     Ocular hypertelorism     Osteoarthritis     Osteoporosis 2016    DEXA    Routine Papanicolaou smear 2021    Negative (no hpv)     Sicca syndrome (HCC)     Sleep apnea     mouth device    Strabismus       Past Surgical History:   Procedure Laterality Date    CATARACT REMOVAL  2020    cataract/glaucoma surgery     SECTION      x2    COLONOSCOPY N/A 2019    COLONOSCOPY performed by Salvador Spain MD at [facility] ENDOSCOPY    ORTHOPEDIC SURGERY Left 1990s    trigger finger surgery    TONSILLECTOMY      at age 27    TUBAL LIGATION      90s; L/S    UPPER GASTROINTESTINAL ENDOSCOPY N/A 6/15/2023    EGD with BIOPSY performed by Salvador Spain MD at [facility] ENDOSCOPY    UPPER GASTROINTESTINAL ENDOSCOPY N/A 6/15/2023    EGD DIAGNOSTIC ONLY performed by Salvador Spain MD at [facility] ENDOSCOPY    UPPER GASTROINTESTINAL ENDOSCOPY N/A 10/31/2023    ESOPHAGOGASTRODUODENOSCOPY performed by Salvador Spain MD at [facility] ENDOSCOPY    UPPER GASTROINTESTINAL ENDOSCOPY N/A 10/31/2023    EGD BIOPSY performed by Salvador Spain MD at [facility] ENDOSCOPY     Family History   Problem Relation Age of Onset    Alzheimer's Disease Mother          @ 94yo    Other Sister         Non-hodgkins lymphoma    Breast Problems Maternal Aunt     Cancer Sister         Hodgkins    Cancer Brother         Lung Cancer    Alzheimer's Disease Father     Diabetes Father         Heart attack    Heart Disease Father       Social History     Tobacco Use    Smoking status: Never    Smokeless tobacco: Never    Tobacco comments:     Never smoked   Vaping Use    Vaping status: Never Used   Substance Use Topics    Alcohol use: Not Currently    Drug use: No            Medications:       Current Outpatient Medications   Medication Instructions    amLODIPine (NORVASC) 5 mg, Oral, DAILY    atorvastatin (LIPITOR) 20 mg, Oral, DAILY    cycloSPORINE (RESTASIS) 0.05 % ophthalmic emulsion 1 drop, DAILY    latanoprost (XALATAN) 0.005 %

## 2024-12-31 ENCOUNTER — TELEPHONE (OUTPATIENT)
Age: 74
End: 2024-12-31

## 2024-12-31 NOTE — TELEPHONE ENCOUNTER
Please update diagnosis code when you get a opportunity for ct calcium because can't schedule since its reflecting ABN, please assist patient at [phone] to give update once diagnosis submitted properly

## 2025-01-07 NOTE — TELEPHONE ENCOUNTER
Code updated.  Contacted patient to call Central Scheduling to schedule Coronary CTA.  Understanding expressed.

## 2025-01-14 ENCOUNTER — TELEPHONE (OUTPATIENT)
Age: 75
End: 2025-01-14

## 2025-01-14 NOTE — TELEPHONE ENCOUNTER
Left vm and sent [employer] message requesting patient return call to schedule a six month follow up per her [employer] appointment request. Patient is requesting an appointment between 04/04/25 and 04/11/25. Reminded her that Dr. Paulino will be retiring in 04/2025, with his last day seeing patients being 04/08/25. Left number for patient to return call to schedule.

## 2025-01-24 ENCOUNTER — ANCILLARY PROCEDURE (OUTPATIENT)
Age: 75
End: 2025-01-24
Payer: MEDICARE

## 2025-01-24 VITALS
HEIGHT: 58 IN | WEIGHT: 111 LBS | BODY MASS INDEX: 23.3 KG/M2 | SYSTOLIC BLOOD PRESSURE: 140 MMHG | HEART RATE: 74 BPM | DIASTOLIC BLOOD PRESSURE: 80 MMHG

## 2025-01-24 DIAGNOSIS — R94.31 ABNORMAL EKG: ICD-10-CM

## 2025-01-24 DIAGNOSIS — I10 ESSENTIAL HYPERTENSION: ICD-10-CM

## 2025-01-24 DIAGNOSIS — E78.2 MIXED HYPERLIPIDEMIA: ICD-10-CM

## 2025-01-24 DIAGNOSIS — R07.9 CHEST PAIN, UNSPECIFIED TYPE: ICD-10-CM

## 2025-01-24 PROCEDURE — 93306 TTE W/DOPPLER COMPLETE: CPT | Performed by: INTERNAL MEDICINE

## 2025-01-26 LAB
ECHO AO ASC DIAM: 3.2 CM
ECHO AO ASCENDING AORTA INDEX: 2.25 CM/M2
ECHO AO ROOT DIAM: 2.7 CM
ECHO AO ROOT INDEX: 1.9 CM/M2
ECHO AV AREA PEAK VELOCITY: 1.7 CM2
ECHO AV AREA VTI: 1.9 CM2
ECHO AV AREA/BSA PEAK VELOCITY: 1.2 CM2/M2
ECHO AV AREA/BSA VTI: 1.3 CM2/M2
ECHO AV MEAN GRADIENT: 4 MMHG
ECHO AV MEAN VELOCITY: 0.9 M/S
ECHO AV PEAK GRADIENT: 8 MMHG
ECHO AV PEAK VELOCITY: 1.4 M/S
ECHO AV VELOCITY RATIO: 0.71
ECHO AV VTI: 27.3 CM
ECHO BSA: 1.44 M2
ECHO EST RA PRESSURE: 3 MMHG
ECHO LA DIAMETER INDEX: 2.82 CM/M2
ECHO LA DIAMETER: 4 CM
ECHO LA TO AORTIC ROOT RATIO: 1.48
ECHO LA VOL A-L A2C: 42 ML (ref 22–52)
ECHO LA VOL A-L A4C: 31 ML (ref 22–52)
ECHO LA VOL BP: 35 ML (ref 22–52)
ECHO LA VOL MOD A2C: 40 ML (ref 22–52)
ECHO LA VOL MOD A4C: 29 ML (ref 22–52)
ECHO LA VOL/BSA BIPLANE: 25 ML/M2 (ref 16–34)
ECHO LA VOLUME AREA LENGTH: 38 ML
ECHO LA VOLUME INDEX A-L A2C: 30 ML/M2 (ref 16–34)
ECHO LA VOLUME INDEX A-L A4C: 22 ML/M2 (ref 16–34)
ECHO LA VOLUME INDEX AREA LENGTH: 27 ML/M2 (ref 16–34)
ECHO LA VOLUME INDEX MOD A2C: 28 ML/M2 (ref 16–34)
ECHO LA VOLUME INDEX MOD A4C: 20 ML/M2 (ref 16–34)
ECHO LV E' LATERAL VELOCITY: 11.95 CM/S
ECHO LV E' SEPTAL VELOCITY: 7.88 CM/S
ECHO LV EF PHYSICIAN: 55 %
ECHO LV FRACTIONAL SHORTENING: 37 % (ref 28–44)
ECHO LV INTERNAL DIMENSION DIASTOLE INDEX: 3.03 CM/M2
ECHO LV INTERNAL DIMENSION DIASTOLIC: 4.3 CM (ref 3.9–5.3)
ECHO LV INTERNAL DIMENSION SYSTOLIC INDEX: 1.9 CM/M2
ECHO LV INTERNAL DIMENSION SYSTOLIC: 2.7 CM
ECHO LV IVSD: 0.4 CM (ref 0.6–0.9)
ECHO LV MASS 2D: 65.5 G (ref 67–162)
ECHO LV MASS INDEX 2D: 46.1 G/M2 (ref 43–95)
ECHO LV POSTERIOR WALL DIASTOLIC: 0.7 CM (ref 0.6–0.9)
ECHO LV RELATIVE WALL THICKNESS RATIO: 0.33
ECHO LVOT AREA: 2.5 CM2
ECHO LVOT AV VTI INDEX: 0.77
ECHO LVOT DIAM: 1.8 CM
ECHO LVOT MEAN GRADIENT: 2 MMHG
ECHO LVOT PEAK GRADIENT: 4 MMHG
ECHO LVOT PEAK VELOCITY: 1 M/S
ECHO LVOT STROKE VOLUME INDEX: 37.8 ML/M2
ECHO LVOT SV: 53.7 ML
ECHO LVOT VTI: 21.1 CM
ECHO MV A VELOCITY: 0.78 M/S
ECHO MV AREA PHT: 4.1 CM2
ECHO MV AREA VTI: 2 CM2
ECHO MV E DECELERATION TIME (DT): 184.9 MS
ECHO MV E VELOCITY: 0.72 M/S
ECHO MV E/A RATIO: 0.92
ECHO MV E/E' LATERAL: 6.03
ECHO MV E/E' RATIO (AVERAGED): 7.58
ECHO MV E/E' SEPTAL: 9.14
ECHO MV LVOT VTI INDEX: 1.26
ECHO MV MAX VELOCITY: 1 M/S
ECHO MV MEAN GRADIENT: 2 MMHG
ECHO MV MEAN VELOCITY: 0.6 M/S
ECHO MV PEAK GRADIENT: 4 MMHG
ECHO MV PRESSURE HALF TIME (PHT): 53.6 MS
ECHO MV VTI: 26.5 CM
ECHO RA AREA 4C: 11.2 CM2
ECHO RA END SYSTOLIC VOLUME APICAL 4 CHAMBER INDEX BSA: 16 ML/M2
ECHO RA VOLUME: 23 ML
ECHO RV FREE WALL PEAK S': 12.5 CM/S
ECHO RV INTERNAL DIMENSION: 3 CM
ECHO RV TAPSE: 2.9 CM (ref 1.7–?)

## 2025-01-26 PROCEDURE — 93306 TTE W/DOPPLER COMPLETE: CPT | Performed by: INTERNAL MEDICINE

## 2025-01-29 ENCOUNTER — TRANSCRIBE ORDERS (OUTPATIENT)
Dept: ADMINISTRATIVE | Age: 75
End: 2025-01-29

## 2025-01-29 DIAGNOSIS — Z12.31 VISIT FOR SCREENING MAMMOGRAM: Primary | ICD-10-CM

## 2025-02-05 RX ORDER — AMLODIPINE BESYLATE 5 MG/1
5 TABLET ORAL DAILY
Qty: 90 TABLET | Refills: 1 | Status: SHIPPED | OUTPATIENT
Start: 2025-02-05

## 2025-02-24 RX ORDER — IOPAMIDOL 755 MG/ML
100 INJECTION, SOLUTION INTRAVASCULAR
Status: COMPLETED | OUTPATIENT
Start: 2025-02-24 | End: 2025-02-25

## 2025-02-25 ENCOUNTER — HOSPITAL ENCOUNTER (OUTPATIENT)
Facility: HOSPITAL | Age: 75
Discharge: HOME | End: 2025-02-28
Attending: INTERNAL MEDICINE
Payer: MEDICARE

## 2025-02-25 VITALS — DIASTOLIC BLOOD PRESSURE: 70 MMHG | SYSTOLIC BLOOD PRESSURE: 150 MMHG | HEART RATE: 55 BPM

## 2025-02-25 DIAGNOSIS — I10 ESSENTIAL HYPERTENSION: ICD-10-CM

## 2025-02-25 DIAGNOSIS — R94.31 ABNORMAL EKG: ICD-10-CM

## 2025-02-25 DIAGNOSIS — R07.9 CHEST PAIN, UNSPECIFIED TYPE: ICD-10-CM

## 2025-02-25 DIAGNOSIS — E78.2 MIXED HYPERLIPIDEMIA: ICD-10-CM

## 2025-02-25 PROCEDURE — 6360000004 HC RX CONTRAST MEDICATION: Performed by: INTERNAL MEDICINE

## 2025-02-25 PROCEDURE — 6370000000 HC RX 637 (ALT 250 FOR IP): Performed by: INTERNAL MEDICINE

## 2025-02-25 PROCEDURE — 75574 CT ANGIO HRT W/3D IMAGE: CPT

## 2025-02-25 RX ORDER — NITROGLYCERIN 0.4 MG/1
0.8 TABLET SUBLINGUAL ONCE
Status: COMPLETED | OUTPATIENT
Start: 2025-02-25 | End: 2025-02-25

## 2025-02-25 RX ADMIN — IOPAMIDOL 80 ML: 755 INJECTION, SOLUTION INTRAVENOUS at 11:36

## 2025-02-25 RX ADMIN — NITROGLYCERIN 0.8 MG: 0.4 TABLET SUBLINGUAL at 11:25

## 2025-02-25 SDOH — ECONOMIC STABILITY: INCOME INSECURITY: IN THE LAST 12 MONTHS, WAS THERE A TIME WHEN YOU WERE NOT ABLE TO PAY THE MORTGAGE OR RENT ON TIME?: NO

## 2025-02-25 SDOH — ECONOMIC STABILITY: FOOD INSECURITY: WITHIN THE PAST 12 MONTHS, YOU WORRIED THAT YOUR FOOD WOULD RUN OUT BEFORE YOU GOT MONEY TO BUY MORE.: NEVER TRUE

## 2025-02-25 SDOH — ECONOMIC STABILITY: TRANSPORTATION INSECURITY
IN THE PAST 12 MONTHS, HAS THE LACK OF TRANSPORTATION KEPT YOU FROM MEDICAL APPOINTMENTS OR FROM GETTING MEDICATIONS?: NO

## 2025-02-25 SDOH — ECONOMIC STABILITY: FOOD INSECURITY: WITHIN THE PAST 12 MONTHS, THE FOOD YOU BOUGHT JUST DIDN'T LAST AND YOU DIDN'T HAVE MONEY TO GET MORE.: NEVER TRUE

## 2025-02-25 NOTE — PROGRESS NOTES
Patient brought back from the waiting room for preparation of CT Coronary scan.    Patient was prescribed oral beta blocker protocol to be taken at home prior to the exam and took both doses of Atenolol.     20 gauge diffusics IV initiated in the right AC.  Brisk blood return present,  POC Creat lab studies were not requested to be drawn. Brisk NSS flush with ease.    Current vitals are as follows:     79    HR 61      Called CT at 11:20 AM to verify ready for patient.     Nitroglycerin 0.4 mg SL x 2 given to patient at 11:25 AM.  see MAR    Taken to CT via stretcher or W/C at 11:25 AM. Patient  assisted to supine position of comfort on CT table and placed on cardiac monitor and NIBP measurement.    See flowsheets for subsequent VS during scan.    Test completed, vitals after exam are as follows:     70  HR 57    Denies dizziness upon arising. IV catheter removed intact, site is clear. Patient discharged from CT room.    Ju Kimble RN

## 2025-02-28 ENCOUNTER — TELEPHONE (OUTPATIENT)
Age: 75
End: 2025-02-28

## 2025-02-28 ENCOUNTER — OFFICE VISIT (OUTPATIENT)
Age: 75
End: 2025-02-28
Payer: MEDICARE

## 2025-02-28 VITALS
SYSTOLIC BLOOD PRESSURE: 120 MMHG | TEMPERATURE: 97.6 F | BODY MASS INDEX: 22.78 KG/M2 | HEART RATE: 63 BPM | HEIGHT: 59 IN | OXYGEN SATURATION: 98 % | DIASTOLIC BLOOD PRESSURE: 80 MMHG | WEIGHT: 113 LBS | RESPIRATION RATE: 16 BRPM

## 2025-02-28 DIAGNOSIS — I10 HTN (HYPERTENSION), BENIGN: ICD-10-CM

## 2025-02-28 DIAGNOSIS — M79.602 LEFT ARM PAIN: ICD-10-CM

## 2025-02-28 DIAGNOSIS — I25.10 CORONARY ARTERY DISEASE INVOLVING NATIVE CORONARY ARTERY OF NATIVE HEART WITHOUT ANGINA PECTORIS: ICD-10-CM

## 2025-02-28 DIAGNOSIS — E78.5 INCREASED SERUM LIPIDS: ICD-10-CM

## 2025-02-28 DIAGNOSIS — R07.89 OTHER CHEST PAIN: ICD-10-CM

## 2025-02-28 DIAGNOSIS — I25.10 CORONARY ARTERY DISEASE INVOLVING NATIVE CORONARY ARTERY OF NATIVE HEART WITHOUT ANGINA PECTORIS: Primary | ICD-10-CM

## 2025-02-28 LAB
ALBUMIN SERPL-MCNC: 4.2 G/DL (ref 3.5–5)
ALBUMIN/GLOB SERPL: 1.4 (ref 1.1–2.2)
ALP SERPL-CCNC: 72 U/L (ref 45–117)
ALT SERPL-CCNC: 32 U/L (ref 12–78)
ANION GAP SERPL CALC-SCNC: 8 MMOL/L (ref 2–12)
AST SERPL-CCNC: 42 U/L (ref 15–37)
BILIRUB SERPL-MCNC: 0.8 MG/DL (ref 0.2–1)
BUN SERPL-MCNC: 13 MG/DL (ref 6–20)
BUN/CREAT SERPL: 18 (ref 12–20)
CALCIUM SERPL-MCNC: 9.8 MG/DL (ref 8.5–10.1)
CHLORIDE SERPL-SCNC: 109 MMOL/L (ref 97–108)
CHOLEST SERPL-MCNC: 173 MG/DL
CO2 SERPL-SCNC: 22 MMOL/L (ref 21–32)
CREAT SERPL-MCNC: 0.71 MG/DL (ref 0.55–1.02)
GLOBULIN SER CALC-MCNC: 3.1 G/DL (ref 2–4)
GLUCOSE SERPL-MCNC: 111 MG/DL (ref 65–100)
HDLC SERPL-MCNC: 100 MG/DL
HDLC SERPL: 1.7 (ref 0–5)
LDLC SERPL CALC-MCNC: 63 MG/DL (ref 0–100)
POTASSIUM SERPL-SCNC: 5.2 MMOL/L (ref 3.5–5.1)
PROT SERPL-MCNC: 7.3 G/DL (ref 6.4–8.2)
SODIUM SERPL-SCNC: 139 MMOL/L (ref 136–145)
TRIGL SERPL-MCNC: 50 MG/DL
VLDLC SERPL CALC-MCNC: 10 MG/DL

## 2025-02-28 PROCEDURE — 99214 OFFICE O/P EST MOD 30 MIN: CPT | Performed by: INTERNAL MEDICINE

## 2025-02-28 RX ORDER — ASPIRIN 81 MG/1
81 TABLET ORAL DAILY
COMMUNITY
Start: 2025-02-28

## 2025-02-28 ASSESSMENT — PATIENT HEALTH QUESTIONNAIRE - PHQ9
SUM OF ALL RESPONSES TO PHQ QUESTIONS 1-9: 0
SUM OF ALL RESPONSES TO PHQ QUESTIONS 1-9: 0
SUM OF ALL RESPONSES TO PHQ9 QUESTIONS 1 & 2: 0
2. FEELING DOWN, DEPRESSED OR HOPELESS: NOT AT ALL
SUM OF ALL RESPONSES TO PHQ QUESTIONS 1-9: 0
1. LITTLE INTEREST OR PLEASURE IN DOING THINGS: NOT AT ALL
SUM OF ALL RESPONSES TO PHQ QUESTIONS 1-9: 0

## 2025-02-28 NOTE — TELEPHONE ENCOUNTER
Patient is calling because she would like to know her results from her CTA cardiac on 2/25/25.Patient would like to know if she has to come into the office to go over her results.    [phone]

## 2025-02-28 NOTE — PROGRESS NOTES
I have reviewed all needed documentation in preparation for visit. Verified patient by name and date of birth  Chief Complaint   Patient presents with    6 Month Follow-Up       Vitals:    02/28/25 0941   Resp: 16   Temp: 97.6 °F (36.4 °C)   TempSrc: Temporal   Weight: 51.3 kg (113 lb)   Height: 1.499 m (4' 11\")       Health Maintenance Due   Topic Date Due    Flu vaccine (1) 08/01/2024    COVID-19 Vaccine (7 - 2024-25 season) 09/01/2024     \"Have you been to the ER, urgent care clinic since your last visit?  Hospitalized since your last visit?\"    NO    “Have you seen or consulted any other health care providers outside of [facility] since your last visit?”    NO            Click Here for Release of Records Request         Gia LOREDO

## 2025-03-01 RX ORDER — ATORVASTATIN CALCIUM 40 MG/1
40 TABLET, FILM COATED ORAL DAILY
Qty: 90 TABLET | Refills: 3 | Status: SHIPPED | OUTPATIENT
Start: 2025-03-01

## 2025-03-01 NOTE — PROGRESS NOTES
Amy Ta is a 74 y.o. 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    Age-related osteoporosis without current pathological fracture 08/22/2017    Ocular hypertension of left eye 08/22/2017    Increased serum lipids 10/14/2015    Blepharospasm 10/14/2015    Brothers esophagus 10/14/2015    Osteoporosis 10/14/2015     Current Outpatient Medications   Medication Sig Dispense Refill    Misc Natural Products (STRESS & ANXIETY DAY/NIGHT PO) Take by mouth      aspirin 81 MG EC tablet Take 1 tablet by mouth daily      amLODIPine (NORVASC) 5 MG tablet TAKE 1 TABLET BY MOUTH DAILY 90 tablet 1    nitroGLYCERIN (NITROSTAT) 0.4 MG SL tablet Place 1 tablet under the tongue every 5 minutes as needed for Chest pain (as needed only) up to max of 3 total doses. If no relief after 1 dose, call 911. 25 tablet 0    atorvastatin (LIPITOR) 20 MG tablet Take 1 tablet by mouth daily 90 tablet 3    OnabotulinumtoxinA (BOTOX IJ) Inject as directed Every 3 months      Omega-3 Fatty Acids (FISH OIL PO) Take 2 capsules by mouth daily      Probiotic Product (PROBIOTIC PO) Take 1 capsule by mouth daily      TURMERIC PO Take 800 mg by mouth daily      latanoprost (XALATAN) 0.005 % ophthalmic solution ceived the following from [facility]: Outside name: latanoprost (XALATAN) 0.005 % ophthalmic solution      cycloSPORINE (RESTASIS) 0.05 % ophthalmic emulsion 1 drop daily       No current facility-administered medications for this visit.     Allergies: Ibandronate, Thiazide-type diuretics, Cephalexin, and Codeine  Past Medical History:   Diagnosis Date    Arthritis     Brothers esophagus     Blepharospasm     Cataract     GERD (gastroesophageal reflux disease)     Hx of mammogram 09/27/2019    Negative    Hyperlipidemia     Hypertension     Lichen planus bullosus     Neuralgia

## 2025-03-04 ENCOUNTER — HOSPITAL ENCOUNTER (OUTPATIENT)
Facility: HOSPITAL | Age: 75
Discharge: HOME | End: 2025-03-07
Attending: INTERNAL MEDICINE
Payer: MEDICARE

## 2025-03-04 DIAGNOSIS — Z12.31 VISIT FOR SCREENING MAMMOGRAM: ICD-10-CM

## 2025-03-04 PROCEDURE — 77063 BREAST TOMOSYNTHESIS BI: CPT

## 2025-03-26 ASSESSMENT — SLEEP AND FATIGUE QUESTIONNAIRES
HOW LIKELY ARE YOU TO NOD OFF OR FALL ASLEEP WHILE SITTING QUIETLY AFTER LUNCH WITHOUT ALCOHOL: WOULD NEVER DOZE
HAS YOUR RELATIONSHIP WITH FAMILY, FRIENDS OR WORK COLLEAGUES BEEN AFFECTED BECAUSE YOU ARE SLEEPY OR TIRED: NO
HOW LIKELY ARE YOU TO NOD OFF OR FALL ASLEEP IN A CAR, WHILE STOPPED FOR A FEW MINUTES IN TRAFFIC: WOULD NEVER DOZE
DO YOU HAVE DIFFICULTY VISITING YOUR FAMILY OR FRIENDS IN THEIR HOME BECAUSE YOU BECOME SLEEPY OR TIRED: NO
DO YOU HAVE DIFFICULTY OPERATING A MOTOR VEHICLE FOR LONG DISTANCES (GREATER THAN 100 MILES) BECAUSE YOU BECOME SLEEPY: NO
DO YOU HAVE DIFFICULTY WATCHING A MOVIE OR VIDEO BECAUSE YOU BECOME SLEEPY OR TIRED: NO
DO YOU GET TOO LITTLE SLEEP AT NIGHT: NO
FOSQ SCORE: 20
HOW LIKELY ARE YOU TO NOD OFF OR FALL ASLEEP WHILE SITTING QUIETLY AFTER LUNCH WITHOUT ALCOHOL: WOULD NEVER DOZE
ARE YOU BOTHERED BY WAKING UP TOO EARLY AND NOT BEING ABLE TO GET BACK TO SLEEP: YES
HAS YOUR MOOD BEEN AFFECTED BECAUSE YOU ARE SLEEPY OR TIRED: NO
DO YOU HAVE PROBLEMS WITH FREQUENT AWAKENINGS AT NIGHT: YES
WHAT TIME DO YOU USUALLY GO TO BED: 22:30
HOW LIKELY ARE YOU TO NOD OFF OR FALL ASLEEP WHILE SITTING INACTIVE IN A PUBLIC PLACE: WOULD NEVER DOZE
DO YOU WORK SHIFTS: NO
HOW LIKELY ARE YOU TO NOD OFF OR FALL ASLEEP WHILE LYING DOWN TO REST IN THE AFTERNOON WHEN CIRCUMSTANCES PERMIT: WOULD NEVER DOZE
SELECT ANY OF THE FOLLOWING BEHAVIORS OBSERVED WHILE YOU ARE ASLEEP: PAUSES IN BREATHING
DO YOU HAVE DIFFICULTY BEING AS ACTIVE AS YOU WANT TO BE IN THE EVENING BECAUSE YOU ARE SLEEPY OR TIRED: NO
DO YOU GET TOO LITTLE SLEEP AT NIGHT: NO
HOW LIKELY ARE YOU TO NOD OFF OR FALL ASLEEP WHEN YOU ARE A PASSENGER IN A CAR FOR AN HOUR WITHOUT A BREAK: WOULD NEVER DOZE
DO YOU TAKE NAPS: NO
DO YOU HAVE DIFFICULTY OPERATING A MOTOR VEHICLE FOR SHORT DISTANCES (LESS THAN 100 MILES) BECAUSE YOU BECOME SLEEPY: NO
ARE YOU BOTHERED BY WAKING UP TOO EARLY AND NOT BEING ABLE TO GET BACK TO SLEEP: YES
DO YOU GENERALLY HAVE DIFFICULTY REMEMBERING THINGS BECAUSE YOU ARE SLEEPY OR TIRED: NO
HOW LIKELY ARE YOU TO NOD OFF OR FALL ASLEEP WHILE SITTING AND READING: WOULD NEVER DOZE
SELECT ANY OF THE FOLLOWING BEHAVIORS OBSERVED WHILE PATIENT ASLEEP: PAUSES IN BREATHING
HOW LIKELY ARE YOU TO NOD OFF OR FALL ASLEEP IN A CAR, WHILE STOPPED FOR A FEW MINUTES IN TRAFFIC: WOULD NEVER DOZE
HOW LIKELY ARE YOU TO NOD OFF OR FALL ASLEEP WHILE SITTING AND READING: WOULD NEVER DOZE
NUMBER OF TIMES YOU WAKE PER NIGHT: 3
HOW LIKELY ARE YOU TO NOD OFF OR FALL ASLEEP WHILE SITTING INACTIVE IN A PUBLIC PLACE: WOULD NEVER DOZE
DO YOU HAVE DIFFICULTY CONCENTRATING ON THE THINGS YOU DO BECAUSE YOU ARE SLEEPY OR TIRED: NO
AVERAGE NUMBER OF SLEEP HOURS: 7
HOW LIKELY ARE YOU TO NOD OFF OR FALL ASLEEP WHEN YOU ARE A PASSENGER IN A CAR FOR AN HOUR WITHOUT A BREAK: WOULD NEVER DOZE
HOW LIKELY ARE YOU TO NOD OFF OR FALL ASLEEP WHILE WATCHING TV: SLIGHT CHANCE OF DOZING
DO YOU HAVE DIFFICULTY BEING AS ACTIVE AS YOU WANT TO BE IN THE MORNING BECAUSE YOU ARE SLEEPY OR TIRED: NO
ESS TOTAL SCORE: 1
AVERAGE NUMBER OF SLEEP HOURS: 7
HOW LIKELY ARE YOU TO NOD OFF OR FALL ASLEEP WHILE WATCHING TV: SLIGHT CHANCE OF DOZING
HOW LIKELY ARE YOU TO NOD OFF OR FALL ASLEEP WHILE LYING DOWN TO REST IN THE AFTERNOON WHEN CIRCUMSTANCES PERMIT: WOULD NEVER DOZE
HOW LIKELY ARE YOU TO NOD OFF OR FALL ASLEEP WHILE SITTING AND TALKING TO SOMEONE: WOULD NEVER DOZE
HOW LIKELY ARE YOU TO NOD OFF OR FALL ASLEEP WHILE SITTING AND TALKING TO SOMEONE: WOULD NEVER DOZE
WHAT TIME DO YOU USUALLY WAKE UP: 06:30

## 2025-03-28 ENCOUNTER — OFFICE VISIT (OUTPATIENT)
Age: 75
End: 2025-03-28
Payer: MEDICARE

## 2025-03-28 VITALS
BODY MASS INDEX: 22.42 KG/M2 | WEIGHT: 111.2 LBS | TEMPERATURE: 98 F | SYSTOLIC BLOOD PRESSURE: 148 MMHG | HEART RATE: 68 BPM | OXYGEN SATURATION: 96 % | HEIGHT: 59 IN | DIASTOLIC BLOOD PRESSURE: 77 MMHG

## 2025-03-28 DIAGNOSIS — G47.33 OSA (OBSTRUCTIVE SLEEP APNEA): Primary | ICD-10-CM

## 2025-03-28 PROCEDURE — 3078F DIAST BP <80 MM HG: CPT | Performed by: SPECIALIST

## 2025-03-28 PROCEDURE — 1090F PRES/ABSN URINE INCON ASSESS: CPT | Performed by: SPECIALIST

## 2025-03-28 PROCEDURE — 1123F ACP DISCUSS/DSCN MKR DOCD: CPT | Performed by: SPECIALIST

## 2025-03-28 PROCEDURE — 99203 OFFICE O/P NEW LOW 30 MIN: CPT | Performed by: SPECIALIST

## 2025-03-28 PROCEDURE — G8427 DOCREV CUR MEDS BY ELIG CLIN: HCPCS | Performed by: SPECIALIST

## 2025-03-28 PROCEDURE — 1036F TOBACCO NON-USER: CPT | Performed by: SPECIALIST

## 2025-03-28 PROCEDURE — 3077F SYST BP >= 140 MM HG: CPT | Performed by: SPECIALIST

## 2025-03-28 PROCEDURE — G8420 CALC BMI NORM PARAMETERS: HCPCS | Performed by: SPECIALIST

## 2025-03-28 PROCEDURE — 1159F MED LIST DOCD IN RCRD: CPT | Performed by: SPECIALIST

## 2025-03-28 PROCEDURE — G8399 PT W/DXA RESULTS DOCUMENT: HCPCS | Performed by: SPECIALIST

## 2025-03-28 NOTE — PROGRESS NOTES
[facility] - [address]  [facility] [place]  [address] [facility] [address]  Dept: [phone]           [address]  [place] [address]  Tel.  [phone]  Fax. [phone] [address]  [place] [address]  Tel.  [phone]  Fax. [phone] [address] [place] [address]  Tel.  [phone]  Fax. [phone]       Chief Complaint       Chief Complaint   Patient presents with   • Sleep Problem     NP refd by Rony Paulino MD for re-evaluation of LEENA; could not tolerate PAP therapy; LOV 2/12/2019 by Dr. RUY WARNER      Amy Ta is 74 y.o. female seen for evaluation of a sleep disorder.  She had a previous sleep evaluation in 2018.  Patient diagnosed with complex sleep apnea, CPAP failure at 13 cm.    Was started on ASV.  Apparent titration study at 13 cm demonstrating AHI 0.  Patient had HSAT 2/28/2019 with AHI of 10/h.  Previous AHI 18/h.  Patient had been using oral appliance.    Unclear if HSAT was obtained for device efficacy.    She returns today noting that she is having difficulty with her oral appliance.              3/26/2025     5:49 PM   Sleep Medicine   Sitting and reading 0   Watching TV 1   Sitting, inactive in a public place (e.g. a theatre or a meeting) 0   As a passenger in a car for an hour without a break 0   Lying down to rest in the afternoon when circumstances permit 0   Sitting and talking to someone 0   Sitting quietly after a lunch without alcohol 0   In a car, while stopped for a few minutes in traffic 0   Reeves Sleepiness Score 1    Neck (Inches) 11.75       Patient-reported        Allergies   Allergen Reactions   • Ibandronate Palpitations     Was unable to move body paralyzed    • Thiazide-Type Diuretics Other (See Comments)   • Cephalexin Nausea And Vomiting   • Codeine Nausea And Vomiting         Current Outpatient Medications:   •  atorvastatin

## 2025-04-25 ENCOUNTER — CLINICAL DOCUMENTATION (OUTPATIENT)
Age: 75
End: 2025-04-25

## 2025-04-25 ENCOUNTER — TELEPHONE (OUTPATIENT)
Age: 75
End: 2025-04-25

## 2025-04-25 NOTE — PROGRESS NOTES
Date of Service: 10/26/2021    SUBJECTIVE:  The patient returns with continued right knee pain, no relief following a 09/27/2021 right knee Synvisc-One injection.  She reported relief from a 2019 right knee corticosteroid/local anesthetic injection.    PHYSICAL EXAMINATION:  Consistent with degenerative joint disease, right knee, lateral compartment.    DIAGNOSIS:  Degenerative joint disease, right knee, lateral compartment.    PLAN:  While seated, from inferolateral, the patient's right knee was injected with 40 mg of triamcinolone and bupivacaine/lidocaine solution, tolerated well.  Expectations discussed.  Follow up as needed.      Dictated By: Wallace Perez III, MD  Signing Provider: Wallace Perez III, MD    AB/msc ([id number])  DD: 10/26/2021 10:03:06 TD: 10/27/2021 06:59:09    Copy Sent To:      Referral fax[facility] to [facility]. Patient notified.

## 2025-04-25 NOTE — TELEPHONE ENCOUNTER
Patient called in stating she was referred to dentistry after seeing Dr. MOY not too long ago. After checking the chart, it looks like Dr. MOY forgot to sign the office visit and file the referral in system. Will forward to Dr. MOY for assistance.

## 2025-05-05 ENCOUNTER — CLINICAL DOCUMENTATION (OUTPATIENT)
Age: 75
End: 2025-05-05

## 2025-05-05 DIAGNOSIS — G47.33 OSA (OBSTRUCTIVE SLEEP APNEA): Primary | ICD-10-CM

## 2025-05-28 ENCOUNTER — PROCEDURE VISIT (OUTPATIENT)
Age: 75
End: 2025-05-28

## 2025-05-28 ENCOUNTER — HOSPITAL ENCOUNTER (OUTPATIENT)
Facility: HOSPITAL | Age: 75
Discharge: HOME | End: 2025-05-31
Payer: MEDICARE

## 2025-05-28 DIAGNOSIS — G47.33 OSA (OBSTRUCTIVE SLEEP APNEA): Primary | ICD-10-CM

## 2025-05-28 PROCEDURE — 95800 SLP STDY UNATTENDED: CPT | Performed by: SPECIALIST

## 2025-05-28 NOTE — PROGRESS NOTES
[address]  [place] [address]  Tel.  [phone]  Fax. [phone] [address]  [place] [address]  Tel.  [phone]  Fax. [phone] [address] [place] [address]  Tel.  [phone]  Fax. [phone]       Amy Ta is seen today to receive a WatchPAT home sleep apnea testing (HSAT) device.    The Infinity Augmented Reality[employer] HSAT Agreement was reviewed and endorsed by patient.  General information regarding operation of the HSAT device was provided.  Patient viewed the [employer] instructional video while in the clinic.  Patient was advised to contact patient support for any problems using the device.  Patient was advised to complete the Morning Questionnaire after awakening/ending the test.    There were no vitals taken for this visit.    Patient will return the HSAT device by noon unless otherwise approved.  Patient will be contacted with results once the study has been reviewed by the physician, typically within 15 business days.    NeuroSave Serial Number [id number]

## 2025-06-03 ENCOUNTER — CLINICAL DOCUMENTATION (OUTPATIENT)
Age: 75
End: 2025-06-03

## 2025-06-10 ENCOUNTER — OFFICE VISIT (OUTPATIENT)
Age: 75
End: 2025-06-10
Payer: MEDICARE

## 2025-06-10 VITALS
HEART RATE: 63 BPM | OXYGEN SATURATION: 96 % | TEMPERATURE: 97.7 F | BODY MASS INDEX: 23.35 KG/M2 | SYSTOLIC BLOOD PRESSURE: 136 MMHG | RESPIRATION RATE: 16 BRPM | DIASTOLIC BLOOD PRESSURE: 63 MMHG | HEIGHT: 59 IN | WEIGHT: 115.8 LBS

## 2025-06-10 DIAGNOSIS — Z91.81 AT HIGH RISK FOR FALLS: ICD-10-CM

## 2025-06-10 DIAGNOSIS — M81.0 OSTEOPOROSIS, UNSPECIFIED OSTEOPOROSIS TYPE, UNSPECIFIED PATHOLOGICAL FRACTURE PRESENCE: ICD-10-CM

## 2025-06-10 DIAGNOSIS — I10 HTN (HYPERTENSION), BENIGN: Primary | ICD-10-CM

## 2025-06-10 DIAGNOSIS — E78.00 HYPERCHOLESTEROLEMIA: ICD-10-CM

## 2025-06-10 PROCEDURE — G8399 PT W/DXA RESULTS DOCUMENT: HCPCS | Performed by: STUDENT IN AN ORGANIZED HEALTH CARE EDUCATION/TRAINING PROGRAM

## 2025-06-10 PROCEDURE — G2211 COMPLEX E/M VISIT ADD ON: HCPCS | Performed by: STUDENT IN AN ORGANIZED HEALTH CARE EDUCATION/TRAINING PROGRAM

## 2025-06-10 PROCEDURE — 99214 OFFICE O/P EST MOD 30 MIN: CPT | Performed by: STUDENT IN AN ORGANIZED HEALTH CARE EDUCATION/TRAINING PROGRAM

## 2025-06-10 PROCEDURE — G8427 DOCREV CUR MEDS BY ELIG CLIN: HCPCS | Performed by: STUDENT IN AN ORGANIZED HEALTH CARE EDUCATION/TRAINING PROGRAM

## 2025-06-10 PROCEDURE — 1159F MED LIST DOCD IN RCRD: CPT | Performed by: STUDENT IN AN ORGANIZED HEALTH CARE EDUCATION/TRAINING PROGRAM

## 2025-06-10 PROCEDURE — G8420 CALC BMI NORM PARAMETERS: HCPCS | Performed by: STUDENT IN AN ORGANIZED HEALTH CARE EDUCATION/TRAINING PROGRAM

## 2025-06-10 PROCEDURE — 1036F TOBACCO NON-USER: CPT | Performed by: STUDENT IN AN ORGANIZED HEALTH CARE EDUCATION/TRAINING PROGRAM

## 2025-06-10 PROCEDURE — 3078F DIAST BP <80 MM HG: CPT | Performed by: STUDENT IN AN ORGANIZED HEALTH CARE EDUCATION/TRAINING PROGRAM

## 2025-06-10 PROCEDURE — 1160F RVW MEDS BY RX/DR IN RCRD: CPT | Performed by: STUDENT IN AN ORGANIZED HEALTH CARE EDUCATION/TRAINING PROGRAM

## 2025-06-10 PROCEDURE — 3075F SYST BP GE 130 - 139MM HG: CPT | Performed by: STUDENT IN AN ORGANIZED HEALTH CARE EDUCATION/TRAINING PROGRAM

## 2025-06-10 PROCEDURE — 1123F ACP DISCUSS/DSCN MKR DOCD: CPT | Performed by: STUDENT IN AN ORGANIZED HEALTH CARE EDUCATION/TRAINING PROGRAM

## 2025-06-10 PROCEDURE — 1090F PRES/ABSN URINE INCON ASSESS: CPT | Performed by: STUDENT IN AN ORGANIZED HEALTH CARE EDUCATION/TRAINING PROGRAM

## 2025-06-10 RX ORDER — EZETIMIBE 10 MG/1
10 TABLET ORAL DAILY
Qty: 90 TABLET | Refills: 1 | Status: SHIPPED | OUTPATIENT
Start: 2025-06-10

## 2025-06-10 ASSESSMENT — ENCOUNTER SYMPTOMS
ABDOMINAL PAIN: 0
SINUS PAIN: 0
CONSTIPATION: 0
EYE REDNESS: 0
VOMITING: 0
DIARRHEA: 0
SHORTNESS OF BREATH: 0
NAUSEA: 0
COUGH: 0
WHEEZING: 0

## 2025-06-10 NOTE — PROGRESS NOTES
I have reviewed all needed documentation in preparation for visit. Verified patient by name and date of birth  Chief Complaint   Patient presents with    Establish Care    Discuss Medications     statin       Vitals:    06/10/25 1426   BP: 136/63   BP Site: Left Upper Arm   Patient Position: Sitting   BP Cuff Size: Medium Adult   Pulse: 63   Resp: 16   Temp: 97.7 °F (36.5 °C)   TempSrc: Temporal   SpO2: 96%   Weight: 52.5 kg (115 lb 12.8 oz)   Height: 1.499 m (4' 11\")       Health Maintenance Due   Topic Date Due    COVID-19 Vaccine (7 - 2024-25 season) 09/01/2024    Annual Wellness Visit (Medicare)  05/15/2025     \"Have you been to the ER, urgent care clinic since your last visit?  Hospitalized since your last visit?\"    NO    “Have you seen or consulted any other health care providers outside of [facility] since your last visit?”    NO            Click Here for Release of Records Request         JOSE Muñoz  
and discontinuing treatment which would further progress disease.   Patient has the following chronic illness HTN with stable disease. Side effect of treatment reported tolerating amlodipine well and discussed risks vs benefits of continuing treatment, trying alternative therapies and discontinuing treatment which would further progress disease.   Patient has the following new illness myalgia from statin with active symptoms. Treatment options discussed discontinue statin and start Zetia and alternatives provided including doing nothing. Side effects of treatments discussed and all questions answered.  Review of all external records to include (1) pulmonary (2) GI (3) Cardiology.  Review of most recent CBC, CMP, A1c, lipid profile as made available in patients chart.   New testing/treatment ordered which will followed by myself and discussed at length with the patient David.   Current medication list:   Current Outpatient Medications   Medication Sig Dispense Refill    ezetimibe (ZETIA) 10 MG tablet Take 1 tablet by mouth daily 90 tablet 1    Misc Natural Products (STRESS & ANXIETY DAY/NIGHT PO) Take by mouth      amLODIPine (NORVASC) 5 MG tablet TAKE 1 TABLET BY MOUTH DAILY 90 tablet 1    nitroGLYCERIN (NITROSTAT) 0.4 MG SL tablet Place 1 tablet under the tongue every 5 minutes as needed for Chest pain (as needed only) up to max of 3 total doses. If no relief after 1 dose, call 911. 25 tablet 0    OnabotulinumtoxinA (BOTOX IJ) Inject as directed Every 3 months      Omega-3 Fatty Acids (FISH OIL PO) Take 2 capsules by mouth daily      Probiotic Product (PROBIOTIC PO) Take 1 capsule by mouth daily      TURMERIC PO Take 800 mg by mouth daily      latanoprost (XALATAN) 0.005 % ophthalmic solution ceived the following from [facility]: Outside name: latanoprost (XALATAN) 0.005 % ophthalmic solution      cycloSPORINE (RESTASIS) 0.05 % ophthalmic emulsion 1 drop daily       No current facility-administered

## 2025-06-17 ENCOUNTER — CLINICAL DOCUMENTATION (OUTPATIENT)
Age: 75
End: 2025-06-17

## 2025-07-03 ENCOUNTER — ANESTHESIA EVENT (OUTPATIENT)
Facility: HOSPITAL | Age: 75
End: 2025-07-03
Payer: MEDICARE

## 2025-07-03 ENCOUNTER — HOSPITAL ENCOUNTER (OUTPATIENT)
Facility: HOSPITAL | Age: 75
Setting detail: OUTPATIENT SURGERY
Discharge: HOME | End: 2025-07-03
Attending: INTERNAL MEDICINE | Admitting: INTERNAL MEDICINE
Payer: MEDICARE

## 2025-07-03 ENCOUNTER — ANESTHESIA (OUTPATIENT)
Facility: HOSPITAL | Age: 75
End: 2025-07-03
Payer: MEDICARE

## 2025-07-03 VITALS
DIASTOLIC BLOOD PRESSURE: 56 MMHG | RESPIRATION RATE: 15 BRPM | TEMPERATURE: 97.7 F | BODY MASS INDEX: 24.07 KG/M2 | WEIGHT: 111.55 LBS | OXYGEN SATURATION: 100 % | SYSTOLIC BLOOD PRESSURE: 153 MMHG | HEART RATE: 58 BPM | HEIGHT: 57 IN

## 2025-07-03 PROCEDURE — 3700000001 HC ADD 15 MINUTES (ANESTHESIA): Performed by: INTERNAL MEDICINE

## 2025-07-03 PROCEDURE — 3600007502: Performed by: INTERNAL MEDICINE

## 2025-07-03 PROCEDURE — 7100000010 HC PHASE II RECOVERY - FIRST 15 MIN: Performed by: INTERNAL MEDICINE

## 2025-07-03 PROCEDURE — 88305 TISSUE EXAM BY PATHOLOGIST: CPT

## 2025-07-03 PROCEDURE — 3700000000 HC ANESTHESIA ATTENDED CARE: Performed by: INTERNAL MEDICINE

## 2025-07-03 PROCEDURE — 3600007512: Performed by: INTERNAL MEDICINE

## 2025-07-03 PROCEDURE — 6360000002 HC RX W HCPCS: Performed by: NURSE ANESTHETIST, CERTIFIED REGISTERED

## 2025-07-03 PROCEDURE — 2709999900 HC NON-CHARGEABLE SUPPLY: Performed by: INTERNAL MEDICINE

## 2025-07-03 PROCEDURE — 7100000011 HC PHASE II RECOVERY - ADDTL 15 MIN: Performed by: INTERNAL MEDICINE

## 2025-07-03 RX ORDER — SODIUM CHLORIDE 9 MG/ML
INJECTION, SOLUTION INTRAVENOUS CONTINUOUS
Status: DISCONTINUED | OUTPATIENT
Start: 2025-07-03 | End: 2025-07-03 | Stop reason: HOSPADM

## 2025-07-03 RX ORDER — PROPOFOL 10 MG/ML
INJECTION, EMULSION INTRAVENOUS
Status: DISCONTINUED | OUTPATIENT
Start: 2025-07-03 | End: 2025-07-03 | Stop reason: SDUPTHER

## 2025-07-03 RX ADMIN — PROPOFOL 40 MG: 10 INJECTION, EMULSION INTRAVENOUS at 08:29

## 2025-07-03 RX ADMIN — PROPOFOL 80 MG: 10 INJECTION, EMULSION INTRAVENOUS at 08:24

## 2025-07-03 RX ADMIN — PROPOFOL 30 MG: 10 INJECTION, EMULSION INTRAVENOUS at 08:26

## 2025-07-03 RX ADMIN — LIDOCAINE HYDROCHLORIDE 60 MG: 20 INJECTION, SOLUTION INFILTRATION; PERINEURAL at 08:24

## 2025-07-03 RX ADMIN — PROPOFOL 30 MG: 10 INJECTION, EMULSION INTRAVENOUS at 08:32

## 2025-07-03 RX ADMIN — PROPOFOL 150 MCG/KG/MIN: 10 INJECTION, EMULSION INTRAVENOUS at 08:36

## 2025-07-03 ASSESSMENT — PAIN SCALES - GENERAL
PAINLEVEL_OUTOF10: 0
PAINLEVEL_OUTOF10: 0

## 2025-07-03 ASSESSMENT — PAIN - FUNCTIONAL ASSESSMENT: PAIN_FUNCTIONAL_ASSESSMENT: 0-10

## 2025-07-03 NOTE — PERIOP NOTE
CRE balloon dilatation of the esophagus   18 mm Balloon inflated to 3 ATMs and held for 30 seconds.  19 mm Balloon inflated to 4.5 ATMs and held for 30 seconds.  20 mm Balloon inflated to 6 ATMs and held for 30 seconds.    No subcutaneous crepitus of the chest or cervical region was noted post dilatation.

## 2025-07-03 NOTE — ANESTHESIA POSTPROCEDURE EVALUATION
Department of Anesthesiology  Postprocedure Note    Patient: Amy Ta  MRN: [id number]  YOB: 1950  Date of evaluation: 7/3/2025    Procedure Summary       Date: 07/03/25 Room / Location: [facility] ENDO 03 / [facility] ENDOSCOPY    Anesthesia Start: 0817 Anesthesia Stop: 0849    Procedures:       COLONOSCOPY (Lower GI Region)      ESOPHAGOGASTRODUODENOSCOPY (Upper GI Region)      ESOPHAGOGASTRODUODENOSCOPY DILATION BALLOON (Upper GI Region)      ESOPHAGOGASTRODUODENOSCOPY BIOPSY (Upper GI Region) Diagnosis:       Dysphagia, unspecified type      Brothers's esophagus without dysplasia      Personal history of colon polyps, unspecified      (Dysphagia, unspecified type [R13.10])      (Brothers's esophagus without dysplasia [K22.70])      (Personal history of colon polyps, unspecified [Z86.0100])    Surgeons: Salvador Spain MD Responsible Provider: Lindsey Reid MD    Anesthesia Type: MAC ASA Status: 2            Anesthesia Type: MAC    Odalys Phase I: Odalys Score: 10    Odalys Phase II:      Anesthesia Post Evaluation    Patient location during evaluation: bedside  Patient participation: complete - patient participated  Level of consciousness: awake and alert and responsive to verbal stimuli  Pain score: 0  Airway patency: patent  Nausea & Vomiting: no nausea and no vomiting  Cardiovascular status: hemodynamically stable  Respiratory status: acceptable and room air  Hydration status: stable  Pain management: adequate    No notable events documented.

## 2025-07-03 NOTE — DISCHARGE INSTRUCTIONS
[facility]  Salvador Spain M.D.  [phone]                 COLON and EGD DISCHARGE INSTRUCTIONS    7/3/2025    Amy Ta  :  1950  Jody Medical Record Number:  [id number]      DISCOMFORT:  Sore throat- throat lozenges or warm salt water gargle  Redness at IV site- apply warm compress to area; if redness or soreness persist- contact your physician  There may be a slight amount of blood passed from the rectum  Gaseous discomfort- walking, belching will help relieve any discomfort  You may not operate a vehicle for 12 hours  You may not engage in an occupation involving machinery or appliances for rest of today  You may not drink alcoholic beverages for at least 12 hours  Avoid making any critical decisions for at least 24 hour  DIET:   High fiber diet   - however -  remember your colon is empty and a heavy meal will produce gas.   Avoid these foods:  vegetables, fried / greasy foods, carbonated drinks for today     ACTIVITY:  You may  resume your normal daily activities it is recommended that you spend the remainder of the day resting -  avoid any strenuous activity and driving.    CALL M.DSamantha  ANY SIGN OF:   Increasing pain, nausea, vomiting  Abdominal distension (swelling)  New increased bleeding (oral or rectal)  Fever (chills)  Pain in chest area  Bloody discharge from nose or mouth  Shortness of breath      Follow-up Instructions:   Call Dr. Spain if any questions at [phone].  Results of procedure / biopsy in 7 to 10 days, we will call you with these results.  Your endoscopy showed no stricture, minimal changes from acid reflux, otherwise looked good. Dilation of the esophagus was performed and biopsies obtained. Continue with anti-reflux measures and prn antacids.   Your colonoscopy showed normal mucosa throughout. Based on age, no repeat screening colonoscopy is needed.

## 2025-07-03 NOTE — PERIOP NOTE
Received recovery report from anesthesia team, see anesthesia note. Abdomen remains soft and non-tender post-procedure. Pt has no complaints at this time and tolerated procedure well. Endoscope was pre-cleaned at the bedside by Shahid Aleman immediately following procedure. Post recovery report given to Mary Caro.

## 2025-07-03 NOTE — ANESTHESIA PRE PROCEDURE
Department of Anesthesiology  Preprocedure Note       Name:  Amy Ta   Age:  75 y.o.  :  1950                                          MRN:  [id number]         Date:  7/3/2025      Surgeon: Surgeon(s):  Salvador Spain MD    Procedure: Procedure(s):  COLONOSCOPY  ESOPHAGOGASTRODUODENOSCOPY    Medications prior to admission:   Prior to Admission medications    Medication Sig Start Date End Date Taking? Authorizing Provider   ezetimibe (ZETIA) 10 MG tablet Take 1 tablet by mouth daily 6/10/25   Janis Le DO   Misc Natural Products (STRESS & ANXIETY DAY/NIGHT PO) Take by mouth    Francisca Coombs MD   amLODIPine (NORVASC) 5 MG tablet TAKE 1 TABLET BY MOUTH DAILY 25   Rony Paulino MD   nitroGLYCERIN (NITROSTAT) 0.4 MG SL tablet Place 1 tablet under the tongue every 5 minutes as needed for Chest pain (as needed only) up to max of 3 total doses. If no relief after 1 dose, call 911. 24   Janis Le DO   OnabotulinumtoxinA (BOTOX IJ) Inject as directed Every 3 months    Francisca Coombs MD   Omega-3 Fatty Acids (FISH OIL PO) Take 2 capsules by mouth daily    Francisca Coombs MD   Probiotic Product (PROBIOTIC PO) Take 1 capsule by mouth daily    Francisca Coombs MD   TURMERIC PO Take 800 mg by mouth daily    Automatic Reconciliation, Ar   latanoprost (XALATAN) 0.005 % ophthalmic solution ceived the following from [facility]: Outside name: latanoprost (XALATAN) 0.005 % ophthalmic solution 10/7/21   Automatic Reconciliation, Ar   cycloSPORINE (RESTASIS) 0.05 % ophthalmic emulsion 1 drop daily 12/31/15   ProviderFrancisca MD       Current medications:    No current facility-administered medications for this encounter.       Allergies:    Allergies   Allergen Reactions   • Ibandronate Palpitations     Was unable to move body paralyzed    • Thiazide-Type Diuretics Other (See Comments)   • Cephalexin Nausea And Vomiting   • Codeine Nausea And Vomiting 
\"SJF4CRY\", \"ZZO2QDY\", \"BEART\", \"U9KICCBD\"     Type & Screen (If Applicable):  No results found for: \"ABORH\", \"LABANTI\"    Drug/Infectious Status (If Applicable):  Lab Results   Component Value Date/Time    HEPCAB <0.1 2016 09:41 AM       COVID-19 Screening (If Applicable): No results found for: \"COVID19\"        Anesthesia Evaluation  Patient summary reviewed and Nursing notes reviewed  Airway: Mallampati: II  TM distance: >3 FB   Neck ROM: full  Mouth opening: > = 3 FB   Dental: normal exam   (+) implants      Pulmonary:normal exam    (+)     sleep apnea:                                  Cardiovascular:    (+) hypertension: moderate and mild        Rhythm: regular  Rate: normal           Beta Blocker:  Not on Beta Blocker         Neuro/Psych:               GI/Hepatic/Renal:   (+) GERD: well controlled         ROS comment: H/o Barrette's esophagus.   Endo/Other:    (+) : arthritis: OA..                 Abdominal: normal exam            Vascular:          Other Findings:  x 2  Tubal ligation  CE/IOL  Right thumb repair        Anesthesia Plan      MAC     ASA 2       Induction: intravenous.      Anesthetic plan and risks discussed with patient.      Plan discussed with CRNA.                ALONDRA Reid MD   7/3/2025

## 2025-07-03 NOTE — PROGRESS NOTES
Amy Ta  1950  [id number]    Situation:  Verbal report received from: MARYBETH Betancur   Procedure: Procedure(s):  COLONOSCOPY  ESOPHAGOGASTRODUODENOSCOPY  ESOPHAGOGASTRODUODENOSCOPY DILATION BALLOON  ESOPHAGOGASTRODUODENOSCOPY BIOPSY    Background:    Preoperative diagnosis: Dysphagia, unspecified type [R13.10]  Brothers's esophagus without dysplasia [K22.70]  Personal history of colon polyps, unspecified [Z86.0100]  Postoperative diagnosis: * No post-op diagnosis entered *    :  Dr. Spain   Assistant(s): Circulator: Jm Gipson RN  Surgical Assistant: Shahid Aleman    Specimens:   ID Type Source Tests Collected by Time Destination   1 : GE Junction Biopsy Tissue GE Junction Biopsy SURGICAL PATHOLOGY Salvador Spain MD 7/3/2025 0830      H. Pylori     no    Assessment:  Intra-procedure medications     Anesthesia gave intra-procedure sedation and medications, see anesthesia flow sheet    yes    Intravenous fluids: NS@ KVO     Vital signs stable    yes    Abdominal assessment: round and soft    yes    Recommendation:  Discharge patient per MD order   yes.  Return to floor   outpatient   Family or Friend    family   Permission to share finding with family or friend    yes

## 2025-07-03 NOTE — OP NOTE
[facility]  Salvador Spain M.D.  [phone]            7/3/2025          Colonoscopy Operative Report  Amy Ta  :  1950  [facility]ody Medical Record Number:  [id number]      Indications:    Screening colonoscopy     :  Salvador Spain MD    Referring Provider: Janis Le DO    Sedation:  MAC anesthesia    Pre-Procedural Exam:      Airway: clear,  No airway problems anticipated  Heart: RRR, without gallops or rubs  Lungs: clear bilaterally without wheezes, crackles, or rhonchi  Abdomen: soft, nontender, nondistended, bowel sounds present  Mental Status: awake, alert and oriented to person, place and time     Procedure Details:  After informed consent was obtained with all risks and benefits of procedure explained and preoperative exam completed, the patient was taken to the endoscopy suite and placed in the left lateral decubitus position.  Upon sequential sedation as per above, a digital rectal exam was performed. The Olympus videocolonoscope  was inserted in the rectum and carefully advanced to the cecum, which was identified by the ileocecal valve and appendiceal orifice.  The quality of preparation was good.  The colonoscope was slowly withdrawn with careful inspection and evaluation between folds. Retroflexion in the rectum was performed.    Findings:   Terminal Ileum: not intubated  Cecum: normal  Ascending Colon: normal  Transverse Colon: normal  Descending Colon: normal  Sigmoid: normal  Rectum: no mucosal lesion appreciated  Grade 1 internal hemorrhoid(s);    Interventions:  none    Specimen Removed:  none    Complications: None.     EBL:  None.    Impression:  Small internal hemorrhoids, otherwise normal mucosa.    Recommendations:  -Based on age, repeat screening colonoscopy in 10 years is not indicated.   -High fiber diet.    -Resume current medication(s)    Discharge Disposition:  Home in the company of a  when able to ambulate.    Salvador Spain MD  7/3/2025   8:58 AM

## 2025-07-03 NOTE — OP NOTE
[facility]  Salvador Spain M.D.  [phone]           7/3/2025                EGD Operative Report  Amy Ta  :  1950  [facility] Medical Record Number:  [id number]      Indication: Dyphagia/odynophagia, GERD    : Salvador Spain MD    Referring Provider:  Janis Le DO      Anesthesia/Sedation:  MAC anesthesia    Airway assessment: No airway problems anticipated    Pre-Procedural Exam:      Airway: clear, no airway problems anticipated  Heart: RRR, without gallops or rubs  Lungs: clear bilaterally without wheezes, crackles, or rhonchi  Abdomen: soft, nontender, nondistended, bowel sounds present  Mental Status: awake, alert and oriented to person, place and time       Procedure Details     After infomed consent was obtained for the procedure, with all risks and benefits of procedure explained the patient was taken to the endoscopy suite and placed in the left lateral decubitus position.  Following sequential administration of sedation as per above, the endoscope was inserted into the mouth and advanced under direct vision to second portion of the duodenum.  A careful inspection was made as the gastroscope was withdrawn, including a retroflexed view of the proximal stomach; findings and interventions are described below.      Findings:   Esophagus:normal mucosa, one salmon colored tongue of 1 cm in length was seen at the GE-junction, otherwise no stricture or inflammation seen. No ulcers or nodules seen.  Stomach: 2 cm hiatal hernia, otherwise normal mucosa  Duodenum/jejunum: normal    Therapies:  Empiric esophageal dilation with 18 to 20 mm sized balloon, no mucosal tear seen.      Specimens: GE-junction           Complications:   None; patient tolerated the procedure well.    EBL:  None.           Impression:   Small hiatal hernia and salmon colored mucosa at the GE-junction    Recommendations:    -Await pathology.  -Continue with anti-reflux measures  -Follow-up office

## 2025-07-03 NOTE — H&P
apnea  Past Gastroenterology Procedures:   Colonoscopy, 2014  Endoscopy  Past Surgeries:   Tubal Ligation   Delivery  Tonsillectomy  Immunizations:   COVID-19 Moderna Vaccine  Flu vaccine  Social History  Marital Status:         Alcohol:   None  Caffeine:   Daily.  Tobacco:   Never smoker  Drug Use:   None  Exercise:   Yoga and strength training 4 times a week.  Family History   No history of Colon cancer, Inflammatory bowel disease, Liver disease  Brother: ; Diagnosed with Lung cancer;  Father: ; at age 69; Diagnosed with Heart Attack;  Mother: ; at age 93; Diagnosed with Alzheimer's Disease;  Sister: ; at age 39; Diagnosed with Colon Polyps, Hodgkin's Disease, Non Hodgkin's Lymphoma;  Review of Systems:  Gastrointestinal: Denies abdominal pain, change in bowel habits, constipation, diarrhea, bloating/gas, heartburn/reflux, nausea, vomiting, blood in stool, difficulty swallowing, anorectal pain/itching, incontinence of stool, black tarry stools.  Genitourinary: Denies dark urine, frequent urination, heavy menstruation, pregnancy, blood in urine.  Integumentary: Denies itching, jaundice, rashes.  Cardiovascular: Denies chest pain, irregular heart beat, palpitations, peripheral edema, heart murmur.  Neurological: Denies frequent headaches, numbness or tingling, memory loss/confusion.  Endocrine: Denies excessive thirst, cold intolerance.  Constitutional: Denies fatigue, fever, loss of appetite, night sweats, weight gain, weight loss.  Psychiatric: Denies anxiety, depression.  ENMT: Denies sore throat, double vision, eye irritation, eye pain, eye redness, hoarseness, mouth sores.  Hematologic/Lymphatic: Denies easy bruising, prolonged bleeding.  Musculoskeletal: Denies back pain, joint pain.  Respiratory: Denies cough, wheezing, snoring, sleep apnea, shortness of breath.  Allergic/Immunologic: Denies allergies.  Vital Signs:  BP  (mmHg)  Pulse  (bpm) Weight

## 2025-08-08 RX ORDER — AMLODIPINE BESYLATE 5 MG/1
5 TABLET ORAL DAILY
Qty: 90 TABLET | Refills: 1 | Status: CANCELLED | OUTPATIENT
Start: 2025-08-08

## 2025-08-08 RX ORDER — AMLODIPINE BESYLATE 5 MG/1
5 TABLET ORAL DAILY
Qty: 90 TABLET | Refills: 1 | Status: SHIPPED | OUTPATIENT
Start: 2025-08-08

## (undated) DEVICE — ADULT SPO2 SENSOR: Brand: NELLCOR

## (undated) DEVICE — BLUNTFILL: Brand: MONOJECT

## (undated) DEVICE — 1200 GUARD II KIT W/5MM TUBE W/O VAC TUBE: Brand: GUARDIAN

## (undated) DEVICE — SOLIDIFIER MEDC 1200ML -- CONVERT TO 356117

## (undated) DEVICE — KIT COLON W/ 1.1OZ LUB AND 2 END

## (undated) DEVICE — BASIN EMSIS 16OZ GRAPHITE PLAS KID SHP MOLD GRAD FOR ORAL

## (undated) DEVICE — SOLIDIFIER FLD 2OZ 1500CC N DISINF IN BTL DISP SAFESORB

## (undated) DEVICE — BITEBLOCK ENDOSCP 60FR MAXI WHT POLYETH STURDY W/ VELC WVN

## (undated) DEVICE — Device

## (undated) DEVICE — CONTAINER SPEC 20 ML LID NEUT BUFF FORMALIN 10 % POLYPR STS

## (undated) DEVICE — SET ADMIN 16ML TBNG L100IN 2 Y INJ SITE IV PIGGY BK DISP (ORDER IN MULIPLES OF 48)

## (undated) DEVICE — NDL FLTR TIP 5 MIC 18GX1.5IN --

## (undated) DEVICE — SYRINGE MED 5ML STD CLR PLAS LUERLOCK TIP N CTRL DISP

## (undated) DEVICE — SYR 3ML LL TIP 1/10ML GRAD --

## (undated) DEVICE — NDL PRT INJ NSAF BLNT 18GX1.5 --

## (undated) DEVICE — FORCEPS BX L240CM JAW DIA2.8MM L CAP W/ NDL MIC MESH TOOTH

## (undated) DEVICE — CUFF RMFG BP INF SZ 11 DISP -- LAWSON OEM ITEM 238915

## (undated) DEVICE — SYR 5ML 1/5 GRAD LL NSAF LF --

## (undated) DEVICE — BAG BELONG PT PERS CLEAR HANDL

## (undated) DEVICE — CANNULA CUSH AD W/ 14FT TBG

## (undated) DEVICE — SET ADMIN 16ML TBNG L100IN 2 Y INJ SITE IV PIGGY BK DISP

## (undated) DEVICE — CANN NASAL O2 CAPNOGRAPHY AD -- FILTERLINE

## (undated) DEVICE — IV STRT KT 3282] LSL INDUSTRIES INC]

## (undated) DEVICE — SYRINGE MED 3ML CLR PLAS STD N CTRL LUERLOCK TIP DISP

## (undated) DEVICE — BAG SPEC BIOHZRD 10 X 10 IN --

## (undated) DEVICE — BLUNTFILL WITH FILTER: Brand: MONOJECT

## (undated) DEVICE — SYRINGE MEDICAL 3ML CLEAR PLASTIC STANDARD NON CONTROL LUERLOCK TIP DISPOSABLE

## (undated) DEVICE — CATH IV AUTOGRD BC BLU 22GA 25 -- INSYTE

## (undated) DEVICE — KENDALL RADIOLUCENT FOAM MONITORING ELECTRODE -RECTANGULAR SHAPE: Brand: KENDALL

## (undated) DEVICE — CATHETER IV 22GA L1IN OD0.8382-0.9144MM ID0.6096-0.6858MM 382523

## (undated) DEVICE — ELECTRODE,RADIOTRANSLUCENT,FOAM,3PK: Brand: MEDLINE